# Patient Record
Sex: FEMALE | Race: OTHER | HISPANIC OR LATINO | URBAN - METROPOLITAN AREA
[De-identification: names, ages, dates, MRNs, and addresses within clinical notes are randomized per-mention and may not be internally consistent; named-entity substitution may affect disease eponyms.]

---

## 2017-07-12 ENCOUNTER — EMERGENCY (EMERGENCY)
Facility: HOSPITAL | Age: 37
LOS: 1 days | Discharge: HOME | End: 2017-07-12
Attending: EMERGENCY MEDICINE | Admitting: EMERGENCY MEDICINE
Payer: MEDICAID

## 2017-07-12 VITALS
TEMPERATURE: 98 F | HEART RATE: 91 BPM | DIASTOLIC BLOOD PRESSURE: 79 MMHG | SYSTOLIC BLOOD PRESSURE: 127 MMHG | RESPIRATION RATE: 18 BRPM | OXYGEN SATURATION: 100 %

## 2017-07-12 VITALS — TEMPERATURE: 98 F | OXYGEN SATURATION: 97 % | RESPIRATION RATE: 20 BRPM | HEART RATE: 112 BPM

## 2017-07-12 DIAGNOSIS — F41.8 OTHER SPECIFIED ANXIETY DISORDERS: ICD-10-CM

## 2017-07-12 DIAGNOSIS — F41.0 PANIC DISORDER [EPISODIC PAROXYSMAL ANXIETY]: ICD-10-CM

## 2017-07-12 DIAGNOSIS — Z79.891 LONG TERM (CURRENT) USE OF OPIATE ANALGESIC: ICD-10-CM

## 2017-07-12 DIAGNOSIS — Z79.2 LONG TERM (CURRENT) USE OF ANTIBIOTICS: ICD-10-CM

## 2017-07-12 DIAGNOSIS — R07.89 OTHER CHEST PAIN: ICD-10-CM

## 2017-07-12 DIAGNOSIS — Z79.1 LONG TERM (CURRENT) USE OF NON-STEROIDAL ANTI-INFLAMMATORIES (NSAID): ICD-10-CM

## 2017-07-12 DIAGNOSIS — Z79.899 OTHER LONG TERM (CURRENT) DRUG THERAPY: ICD-10-CM

## 2017-07-12 DIAGNOSIS — R69 ILLNESS, UNSPECIFIED: ICD-10-CM

## 2017-07-12 LAB
ALBUMIN SERPL ELPH-MCNC: 3.9 G/DL — SIGNIFICANT CHANGE UP (ref 3.4–5)
ALP SERPL-CCNC: 81 U/L — SIGNIFICANT CHANGE UP (ref 40–120)
ALT FLD-CCNC: 25 U/L — SIGNIFICANT CHANGE UP (ref 12–42)
ANION GAP SERPL CALC-SCNC: 17 MMOL/L — HIGH (ref 9–16)
APAP SERPL-MCNC: <2 UG/ML — LOW (ref 10–30)
APPEARANCE UR: CLEAR — SIGNIFICANT CHANGE UP
AST SERPL-CCNC: 20 U/L — SIGNIFICANT CHANGE UP (ref 15–37)
BASOPHILS NFR BLD AUTO: 0.4 % — SIGNIFICANT CHANGE UP (ref 0–2)
BILIRUB SERPL-MCNC: 0.3 MG/DL — SIGNIFICANT CHANGE UP (ref 0.2–1.2)
BILIRUB UR-MCNC: NEGATIVE — SIGNIFICANT CHANGE UP
BUN SERPL-MCNC: 8 MG/DL — SIGNIFICANT CHANGE UP (ref 7–23)
CALCIUM SERPL-MCNC: 9.5 MG/DL — SIGNIFICANT CHANGE UP (ref 8.5–10.5)
CHLORIDE SERPL-SCNC: 104 MMOL/L — SIGNIFICANT CHANGE UP (ref 96–108)
CO2 SERPL-SCNC: 20 MMOL/L — LOW (ref 22–31)
COLOR SPEC: YELLOW — SIGNIFICANT CHANGE UP
CREAT SERPL-MCNC: 0.94 MG/DL — SIGNIFICANT CHANGE UP (ref 0.5–1.3)
DIFF PNL FLD: NEGATIVE — SIGNIFICANT CHANGE UP
EOSINOPHIL NFR BLD AUTO: 0.3 % — SIGNIFICANT CHANGE UP (ref 0–6)
ETHANOL SERPL-MCNC: 52 MG/DL — HIGH
GLUCOSE SERPL-MCNC: 114 MG/DL — HIGH (ref 70–99)
GLUCOSE UR QL: NEGATIVE — SIGNIFICANT CHANGE UP
HCG UR QL: POSITIVE — SIGNIFICANT CHANGE UP
HCT VFR BLD CALC: 38.4 % — SIGNIFICANT CHANGE UP (ref 34.5–45)
HGB BLD-MCNC: 13.2 G/DL — SIGNIFICANT CHANGE UP (ref 11.5–15.5)
IMM GRANULOCYTES NFR BLD AUTO: 0.4 % — SIGNIFICANT CHANGE UP (ref 0–1.5)
KETONES UR-MCNC: NEGATIVE — SIGNIFICANT CHANGE UP
LEUKOCYTE ESTERASE UR-ACNC: NEGATIVE — SIGNIFICANT CHANGE UP
LIDOCAIN IGE QN: 197 U/L — SIGNIFICANT CHANGE UP (ref 73–393)
LYMPHOCYTES # BLD AUTO: 30.8 % — SIGNIFICANT CHANGE UP (ref 13–44)
MCHC RBC-ENTMCNC: 28.8 PG — SIGNIFICANT CHANGE UP (ref 27–34)
MCHC RBC-ENTMCNC: 34.4 G/DL — SIGNIFICANT CHANGE UP (ref 32–36)
MCV RBC AUTO: 83.7 FL — SIGNIFICANT CHANGE UP (ref 80–100)
MONOCYTES NFR BLD AUTO: 9.4 % — SIGNIFICANT CHANGE UP (ref 2–14)
NEUTROPHILS NFR BLD AUTO: 58.7 % — SIGNIFICANT CHANGE UP (ref 43–77)
NITRITE UR-MCNC: NEGATIVE — SIGNIFICANT CHANGE UP
PCP SPEC-MCNC: SIGNIFICANT CHANGE UP
PH UR: 6 — SIGNIFICANT CHANGE UP (ref 5–8)
PLATELET # BLD AUTO: 330 K/UL — SIGNIFICANT CHANGE UP (ref 150–400)
POTASSIUM SERPL-MCNC: 3.4 MMOL/L — LOW (ref 3.5–5.3)
POTASSIUM SERPL-SCNC: 3.4 MMOL/L — LOW (ref 3.5–5.3)
PROT SERPL-MCNC: 7.7 G/DL — SIGNIFICANT CHANGE UP (ref 6.4–8.2)
PROT UR-MCNC: NEGATIVE MG/DL — SIGNIFICANT CHANGE UP
RBC # BLD: 4.59 M/UL — SIGNIFICANT CHANGE UP (ref 3.8–5.2)
RBC # FLD: 13.4 % — SIGNIFICANT CHANGE UP (ref 10.3–16.9)
SALICYLATES SERPL-MCNC: 1.1 MG/DL — LOW (ref 2.8–20)
SODIUM SERPL-SCNC: 141 MMOL/L — SIGNIFICANT CHANGE UP (ref 132–145)
SP GR SPEC: 1.02 — SIGNIFICANT CHANGE UP (ref 1–1.03)
TROPONIN I SERPL-MCNC: <0.017 NG/ML — LOW (ref 0.02–0.06)
TSH SERPL-MCNC: 1.09 UIU/ML — SIGNIFICANT CHANGE UP (ref 0.36–3.74)
UROBILINOGEN FLD QL: 0.2 E.U./DL — SIGNIFICANT CHANGE UP
WBC # BLD: 15.8 K/UL — HIGH (ref 3.8–10.5)
WBC # FLD AUTO: 15.8 K/UL — HIGH (ref 3.8–10.5)

## 2017-07-12 PROCEDURE — 99282 EMERGENCY DEPT VISIT SF MDM: CPT

## 2017-07-12 PROCEDURE — 93010 ELECTROCARDIOGRAM REPORT: CPT

## 2017-07-12 PROCEDURE — 99285 EMERGENCY DEPT VISIT HI MDM: CPT | Mod: 25

## 2017-07-12 RX ORDER — POTASSIUM CHLORIDE 20 MEQ
40 PACKET (EA) ORAL ONCE
Qty: 0 | Refills: 0 | Status: COMPLETED | OUTPATIENT
Start: 2017-07-12 | End: 2017-07-12

## 2017-07-12 RX ORDER — ONDANSETRON 8 MG/1
4 TABLET, FILM COATED ORAL ONCE
Qty: 0 | Refills: 0 | Status: COMPLETED | OUTPATIENT
Start: 2017-07-12 | End: 2017-07-12

## 2017-07-12 RX ORDER — SODIUM CHLORIDE 9 MG/ML
1000 INJECTION INTRAMUSCULAR; INTRAVENOUS; SUBCUTANEOUS ONCE
Qty: 0 | Refills: 0 | Status: COMPLETED | OUTPATIENT
Start: 2017-07-12 | End: 2017-07-12

## 2017-07-12 RX ADMIN — SODIUM CHLORIDE 1000 MILLILITER(S): 9 INJECTION INTRAMUSCULAR; INTRAVENOUS; SUBCUTANEOUS at 06:13

## 2017-07-12 RX ADMIN — Medication 1 MILLIGRAM(S): at 05:23

## 2017-07-12 RX ADMIN — ONDANSETRON 4 MILLIGRAM(S): 8 TABLET, FILM COATED ORAL at 05:23

## 2017-07-12 RX ADMIN — Medication 40 MILLIEQUIVALENT(S): at 07:11

## 2017-07-12 NOTE — ED PROVIDER NOTE - PROGRESS NOTE DETAILS
called [facility] to see the patient. spoke to dr knutson who notes they will see the patient after 8:30 am. Cleared by psych for d/c. They are faxing referrals. called [facility] to see the patient. spoke to dr knutson who notes they will see the patient after 8:30 am.

## 2017-07-12 NOTE — ED ADULT NURSE NOTE - OBJECTIVE STATEMENT
amyin patient with complaints of anxiety and insomnia for 3 days accompanied by depression and feelings of sadness. Denies any SI/HI. States she's been very stressed lately.

## 2017-07-12 NOTE — ED BEHAVIORAL HEALTH ASSESSMENT NOTE - DESCRIPTION (FIRST USE, LAST USE, QUANTITY, FREQUENCY, DURATION)
reports daily use reports 1 pint "hard liquor" a week previous use reports last use about a day ago past use of extacy

## 2017-07-12 NOTE — ED BEHAVIORAL HEALTH ASSESSMENT NOTE - RISK ASSESSMENT
Although patient has risk factors of depression, anxiety active substance use, multiple stressors, absence of outpatient follow-up, she has protective factors of no hx of prior suicide attempts, no hospitalizations, no self- injurious behavior, motivation to participate in outpatient care and seek help, no access to firearms. Risk is further mitigated by patient being provided with outpatient referrals. Patient is currently low risk of danger to herself or others and does not require inpatient psychiatric hospitalization.

## 2017-07-12 NOTE — ED BEHAVIORAL HEALTH ASSESSMENT NOTE - SUMMARY
Patient is a 36 year old single female; domiciled with children, ages 14 and 18; unemployed ; PPH of depression and anxiety, in treatment with a therapist, no history of seeing a psychiatrist; no prior hospitalizations; no known suicide attempts; no known history of violence or arrests;  active substance abuse with ETOH and cocaine or known history of complicated withdrawal; no significant PMH; brought in by boyfriend due to patient having a panic attack. Patient reports history of anxiety which has been getting worse as well as some low and irritable mood. She denies si/hi/avh, is open to seeking outpatient treatment. Discussed importance of avoidance of drugs and alcohol as these can worsen symptoms. Patient is low risk of danger to herself or others and does not require inpatient psychiatric hospitalization

## 2017-07-12 NOTE — ED BEHAVIORAL HEALTH ASSESSMENT NOTE - REFERRAL / APPOINTMENT DETAILS
pt to follow up with therapist, psychiatric referrals as well as substance abuse treatment referrals to be provided

## 2017-07-12 NOTE — ED BEHAVIORAL HEALTH ASSESSMENT NOTE - HPI (INCLUDE ILLNESS QUALITY, SEVERITY, DURATION, TIMING, CONTEXT, MODIFYING FACTORS, ASSOCIATED SIGNS AND SYMPTOMS)
Patient is a 36 year old single female; domiciled with children, ages 14 and 18; unemployed ; PPH of depression and anxiety, in treatment with a therapist, no history of seeing a psychiatrist; no prior hospitalizations; no known suicide attempts; no known history of violence or arrests;  active substance abuse with ETOH and cocaine or known history of complicated withdrawal; no significant PMH; brought in by boyfriend due to patient having a panic attack.    Patient reports not been sleeping for 3 days, last night tried to go to bed, had a panic attack, was crying, threw up and couldn't breath so she came to the ER. She reports that there was some improvement with the medication she received. She states that she has been having more anxiety due to stressors, including loosing her mother about a year ago and loosing her job a few weeks ago. She states that because of this she has been anxious, and feeling irritable and down. She states that she at times feels like throwing things and will yell at people for "silly things", denies any history of aggression or violence. Patient reports she has been seeing a therapist "Whitley" but has not seen her in a few weeks because she has not found it helpful. Reports that she gets xanax from her PCP, but also states that although she takes a few a night (up to 3 xanax 0.25mg) pills it has not helped. She endorses weekly ETOH use and occasional cocaine use, last a day or so ago. She reports occasional thoughts that she wishes she was not around but denies any suicidal thoughts, no history of self injurious behaviors. The patient denies manic symptoms, past and present.  The patient denies auditory or visual hallucinations, and no delusions could be elicited on direct questioning.  The patient denies suicidal ideation, homicidal ideation, intent, or plan.     See [facility] note for collateral information. Boyfriend at bedside, denies any concerns regarding patient's safety.    Patient counseled regarding avoiding drug and ETOH use as these can worsen anxiety, and will be given resources in the community for follow up.

## 2017-07-12 NOTE — ED BEHAVIORAL HEALTH ASSESSMENT NOTE - OTHER PAST PSYCHIATRIC HISTORY (INCLUDE DETAILS REGARDING ONSET, COURSE OF ILLNESS, INPATIENT/OUTPATIENT TREATMENT)
history of depression and anxiety, seeing a therapist, no history of suicidality, no hospitalizations, has not seen a psychiatrist

## 2017-07-12 NOTE — ED PROVIDER NOTE - OBJECTIVE STATEMENT
Patient with pmhx of anxiety, mild which worsened this past september after the death of her mother, presents with worsening, anxiety, panic attack and insomnia for 3 days. patient notes having a very vivid nightmare 3 nights ago, of a man following her, and since then has been paranoid to go to sleep. notes PMD managing her anxiety with xanax, and ambien for sleep which currently is not helping and finds herself taking 3 xanax at a time with no sleep. denies visual or auditory hallucinations. denies suicidal or homocidal thoughts. lives with 2 sons. boyfriend at the bedside whom she notes is not a danger to her, no hx of domestic violence. patient notes no prior hx of psychiatric admissions, denies hx of etoh or drug abuse. denies smoking. patient expresses she is exhausted, and tired of feeling this way, patient notes she needs help and feels like she may need to be admitted. reports chest pressure, hyperventilating, associated with N/V, and inability to sleep. denies hx of cardiac disease. notes mild improvement after po zofran and ativan. denies family hx

## 2017-07-12 NOTE — ED BEHAVIORAL HEALTH ASSESSMENT NOTE - DIFFERENTIAL
Anxiety disorder not otherwise specified  Depression not otherwise specified  Substance induced mood disorder

## 2017-07-12 NOTE — ED BEHAVIORAL HEALTH ASSESSMENT NOTE - SUICIDE PROTECTIVE FACTORS
Fear of death or dying due to pain/suffering/Future oriented/Identifies reasons for living/Supportive social network or family

## 2017-07-12 NOTE — ED PROVIDER NOTE - MEDICAL DECISION MAKING DETAILS
psychiatric consultation after medical clearance. will ck trop and tsh. low suspicion for acs, given no family or patient hx of cardiac disease or risk factors. currently no psychotic or suicidal/homocidal. accompanied with partner and improving clinically after benzo in ed. no suspicion for pe.

## 2017-07-12 NOTE — ED BEHAVIORAL HEALTH ASSESSMENT NOTE - DESCRIPTION
patient upset upon arrival, yelling/ crying, received ativan PO and valium IV none lives with children, 14 and 18, currently unemployed

## 2017-07-12 NOTE — ED BEHAVIORAL HEALTH ASSESSMENT NOTE - SAFETY PLAN DETAILS
patient with no access to guns or weapons, aware to return to ER with any worsening suicidal ideation, homicidal ideation

## 2017-07-12 NOTE — ED BEHAVIORAL HEALTH NOTE - BEHAVIORAL HEALTH NOTE
Telepsychiatry Encounter  I have visualized that the patient is on an arms-length 1:1.  I have visualized that the patient is in a private space.  I have confirmed with the patient that they understanding and agree to the evaluation being performed via Telepsychiatry.  I have discussed the above with Telepsychiatry Attending: Dr. Palomo   Records reviewed: McAdoo, Tier, CVM, [facility], Psyckes, Alpha:      Collateral contact name/phone:  Hussein (Nursing station contact)  Relationship to Patient:  significant other   Reliability:  Fair  Opinion of reliability of the patient: Fair  Opinion regarding concern for dangerousness:  S/O states “she says she is sick” but denies that she is suicidal.   Role in Patient’s Aftercare if the patient is released:  S/O plays minimal role in patient’s aftercare.     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  Patient is never  with 2 children ages 13 and 18 that currently live with her. Patient is accompanied by significant other Hussein at bedside who was able to speak with this writer by phone for collateral information.  Patients S/O states that he received a phone call from patient early this morning stating “I have chest pains”, “I am sick”, “I need help”. S/O states that he lives across the street from patient and arrived to patient’s home shortly after this call to accompany her to ED by taxi.     S/O states that he is with patient for approximately 1 year and states feeling that she is anxious on most days with panic attacks once every 2  weeks. S/O states that patient hyperventilates at times, complains of chest pain and becomes agitated often. He states that she also appears depressed at times in relation to her mother’s death about 1 year ago. S/O states that patient has a very poor appetite and sleeps only a few hours a night. S/O denies any evidence of hallucinations or delusions and denies that she is suicidal.  S/O denies that patient has any access to weapons.     S/O states that patient had a similar presentation about 1 month ago and denies that she was every hospitalized psychiatrically as far as he knows. S/O states that she does receive therapy in community but was unsure how often/ therapists name/etc.  S/O states that patient drinks alcohol occasionally and denies any drug use.     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       Family History of mental illness: Unknown     Substance Use/ hx of rehab and detox admissions:  unknown     Prior Legal hx:  Unknown     Physical/Sexual /emotional abuse or neglect/ trauma: unknown     I have discussed the above with Telepsychiatry Attending:  Dr. Palomo
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
## 2017-07-12 NOTE — ED ADULT TRIAGE NOTE - CHIEF COMPLAINT QUOTE
Pt reports chest pain X 3 days, worsening over the past hour. Pt also reports nausea and vomiting. Pt crying and anxious at triage.

## 2017-07-18 ENCOUNTER — EMERGENCY (EMERGENCY)
Facility: HOSPITAL | Age: 37
LOS: 1 days | Discharge: HOME | End: 2017-07-18
Attending: EMERGENCY MEDICINE | Admitting: EMERGENCY MEDICINE
Payer: MEDICAID

## 2017-07-18 VITALS
RESPIRATION RATE: 16 BRPM | TEMPERATURE: 98 F | SYSTOLIC BLOOD PRESSURE: 122 MMHG | HEART RATE: 95 BPM | DIASTOLIC BLOOD PRESSURE: 80 MMHG | OXYGEN SATURATION: 97 %

## 2017-07-18 DIAGNOSIS — O20.0 THREATENED ABORTION: ICD-10-CM

## 2017-07-18 DIAGNOSIS — Z79.899 OTHER LONG TERM (CURRENT) DRUG THERAPY: ICD-10-CM

## 2017-07-18 DIAGNOSIS — Z79.1 LONG TERM (CURRENT) USE OF NON-STEROIDAL ANTI-INFLAMMATORIES (NSAID): ICD-10-CM

## 2017-07-18 DIAGNOSIS — Z3A.00 WEEKS OF GESTATION OF PREGNANCY NOT SPECIFIED: ICD-10-CM

## 2017-07-18 DIAGNOSIS — Z79.2 LONG TERM (CURRENT) USE OF ANTIBIOTICS: ICD-10-CM

## 2017-07-18 LAB
ALBUMIN SERPL ELPH-MCNC: 3.5 G/DL — SIGNIFICANT CHANGE UP (ref 3.4–5)
ALP SERPL-CCNC: 78 U/L — SIGNIFICANT CHANGE UP (ref 40–120)
ALT FLD-CCNC: 25 U/L — SIGNIFICANT CHANGE UP (ref 12–42)
ANION GAP SERPL CALC-SCNC: 5 MMOL/L — LOW (ref 9–16)
APPEARANCE UR: CLEAR — SIGNIFICANT CHANGE UP
AST SERPL-CCNC: 19 U/L — SIGNIFICANT CHANGE UP (ref 15–37)
BACTERIA # UR AUTO: (no result) /HPF
BASOPHILS NFR BLD AUTO: 0.5 % — SIGNIFICANT CHANGE UP (ref 0–2)
BILIRUB SERPL-MCNC: 0.3 MG/DL — SIGNIFICANT CHANGE UP (ref 0.2–1.2)
BILIRUB UR-MCNC: NEGATIVE — SIGNIFICANT CHANGE UP
BLD GP AB SCN SERPL QL: NEGATIVE — SIGNIFICANT CHANGE UP
BUN SERPL-MCNC: 11 MG/DL — SIGNIFICANT CHANGE UP (ref 7–23)
CALCIUM SERPL-MCNC: 9.2 MG/DL — SIGNIFICANT CHANGE UP (ref 8.5–10.5)
CHLORIDE SERPL-SCNC: 106 MMOL/L — SIGNIFICANT CHANGE UP (ref 96–108)
CO2 SERPL-SCNC: 26 MMOL/L — SIGNIFICANT CHANGE UP (ref 22–31)
COLOR SPEC: YELLOW — SIGNIFICANT CHANGE UP
COMMENT - URINE: SIGNIFICANT CHANGE UP
CREAT SERPL-MCNC: 0.9 MG/DL — SIGNIFICANT CHANGE UP (ref 0.5–1.3)
DIFF PNL FLD: (no result)
EOSINOPHIL NFR BLD AUTO: 1.2 % — SIGNIFICANT CHANGE UP (ref 0–6)
EPI CELLS # UR: SIGNIFICANT CHANGE UP /HPF
GLUCOSE SERPL-MCNC: 83 MG/DL — SIGNIFICANT CHANGE UP (ref 70–99)
GLUCOSE UR QL: NEGATIVE — SIGNIFICANT CHANGE UP
HCG SERPL-ACNC: 7338 MIU/ML — SIGNIFICANT CHANGE UP
HCT VFR BLD CALC: 38 % — SIGNIFICANT CHANGE UP (ref 34.5–45)
HGB BLD-MCNC: 12.8 G/DL — SIGNIFICANT CHANGE UP (ref 11.5–15.5)
IMM GRANULOCYTES NFR BLD AUTO: 0.5 % — SIGNIFICANT CHANGE UP (ref 0–1.5)
KETONES UR-MCNC: NEGATIVE — SIGNIFICANT CHANGE UP
LEUKOCYTE ESTERASE UR-ACNC: NEGATIVE — SIGNIFICANT CHANGE UP
LYMPHOCYTES # BLD AUTO: 31.8 % — SIGNIFICANT CHANGE UP (ref 13–44)
MCHC RBC-ENTMCNC: 28.6 PG — SIGNIFICANT CHANGE UP (ref 27–34)
MCHC RBC-ENTMCNC: 33.7 G/DL — SIGNIFICANT CHANGE UP (ref 32–36)
MCV RBC AUTO: 85 FL — SIGNIFICANT CHANGE UP (ref 80–100)
MONOCYTES NFR BLD AUTO: 11.1 % — SIGNIFICANT CHANGE UP (ref 2–14)
NEUTROPHILS NFR BLD AUTO: 54.9 % — SIGNIFICANT CHANGE UP (ref 43–77)
NITRITE UR-MCNC: NEGATIVE — SIGNIFICANT CHANGE UP
PH UR: 5.5 — SIGNIFICANT CHANGE UP (ref 5–8)
PLATELET # BLD AUTO: 293 K/UL — SIGNIFICANT CHANGE UP (ref 150–400)
POTASSIUM SERPL-MCNC: 3.7 MMOL/L — SIGNIFICANT CHANGE UP (ref 3.5–5.3)
POTASSIUM SERPL-SCNC: 3.7 MMOL/L — SIGNIFICANT CHANGE UP (ref 3.5–5.3)
PROT SERPL-MCNC: 7 G/DL — SIGNIFICANT CHANGE UP (ref 6.4–8.2)
PROT UR-MCNC: NEGATIVE MG/DL — SIGNIFICANT CHANGE UP
RBC # BLD: 4.47 M/UL — SIGNIFICANT CHANGE UP (ref 3.8–5.2)
RBC # FLD: 13.5 % — SIGNIFICANT CHANGE UP (ref 10.3–16.9)
RBC CASTS # UR COMP ASSIST: (no result) /HPF
RH IG SCN BLD-IMP: POSITIVE — SIGNIFICANT CHANGE UP
RH IG SCN BLD-IMP: POSITIVE — SIGNIFICANT CHANGE UP
SODIUM SERPL-SCNC: 137 MMOL/L — SIGNIFICANT CHANGE UP (ref 132–145)
SP GR SPEC: 1.02 — SIGNIFICANT CHANGE UP (ref 1–1.03)
UROBILINOGEN FLD QL: 0.2 E.U./DL — SIGNIFICANT CHANGE UP
WBC # BLD: 11.3 K/UL — HIGH (ref 3.8–10.5)
WBC # FLD AUTO: 11.3 K/UL — HIGH (ref 3.8–10.5)
WBC UR QL: < 5 /HPF — SIGNIFICANT CHANGE UP

## 2017-07-18 PROCEDURE — 99284 EMERGENCY DEPT VISIT MOD MDM: CPT

## 2017-07-18 PROCEDURE — 76817 TRANSVAGINAL US OBSTETRIC: CPT | Mod: 26

## 2017-07-18 RX ORDER — NITROFURANTOIN MACROCRYSTAL 50 MG
100 CAPSULE ORAL ONCE
Qty: 0 | Refills: 0 | Status: COMPLETED | OUTPATIENT
Start: 2017-07-18 | End: 2017-07-18

## 2017-07-18 RX ORDER — NITROFURANTOIN MACROCRYSTAL 50 MG
1 CAPSULE ORAL
Qty: 14 | Refills: 0 | OUTPATIENT
Start: 2017-07-18 | End: 2017-07-25

## 2017-07-18 RX ADMIN — Medication 100 MILLIGRAM(S): at 16:58

## 2017-07-18 NOTE — ED ADULT TRIAGE NOTE - CHIEF COMPLAINT QUOTE
Pt reports she took a home pregnancy test yesterday that was positive. This afternoon had sexual intercourse with her boyfriend and then noticed she was bleeding. Pt reports vaginal bleeding and abdominal cramping at present Pt reports she had blood work done Friday confirming she was pregnant. This afternoon had sexual intercourse with her boyfriend and then noticed she was bleeding. Pt reports light vaginal bleeding and abdominal cramping at present

## 2017-07-18 NOTE — ED PROVIDER NOTE - PROGRESS NOTE DETAILS
UA w bacteriuria, covered w macrobid. HCG and US consistent w IUP. Radiology report unavailable at this time. I will d/c pt and instructed to call back for official report. F/U w OB GYN. most likely threatened ab. Pelvic rest recommended

## 2017-07-18 NOTE — ED PROVIDER NOTE - OBJECTIVE STATEMENT
35 yo F w/ history of depression/anxiety, recently told she is pregnant; no follow up with OB/GYN at this time, A4 noted mild vaginal spotting with mild cramping yesterday with today with worsening cramping and vaginal bleeding after intercourse. Pt is unclear of blood type, no dysuria, no vomiting, no fever, no chills. Pt hasn't had ultrasound to confirm pregnancy.

## 2017-07-18 NOTE — ED ADULT NURSE NOTE - CHIEF COMPLAINT QUOTE
Pt reports she had blood work done Friday confirming she was pregnant. This afternoon had sexual intercourse with her boyfriend and then noticed she was bleeding. Pt reports light vaginal bleeding and abdominal cramping at present

## 2018-02-11 ENCOUNTER — EMERGENCY (EMERGENCY)
Facility: HOSPITAL | Age: 38
LOS: 1 days | Discharge: HOME | End: 2018-02-11
Admitting: EMERGENCY MEDICINE
Payer: MEDICAID

## 2018-02-11 VITALS
DIASTOLIC BLOOD PRESSURE: 83 MMHG | TEMPERATURE: 98 F | SYSTOLIC BLOOD PRESSURE: 139 MMHG | OXYGEN SATURATION: 96 % | HEART RATE: 94 BPM | WEIGHT: 179.9 LBS | RESPIRATION RATE: 18 BRPM

## 2018-02-11 DIAGNOSIS — Z11.3 ENCOUNTER FOR SCREENING FOR INFECTIONS WITH A PREDOMINANTLY SEXUAL MODE OF TRANSMISSION: ICD-10-CM

## 2018-02-11 DIAGNOSIS — Z20.2 CONTACT WITH AND (SUSPECTED) EXPOSURE TO INFECTIONS WITH A PREDOMINANTLY SEXUAL MODE OF TRANSMISSION: ICD-10-CM

## 2018-02-11 DIAGNOSIS — Z79.891 LONG TERM (CURRENT) USE OF OPIATE ANALGESIC: ICD-10-CM

## 2018-02-11 DIAGNOSIS — Z79.1 LONG TERM (CURRENT) USE OF NON-STEROIDAL ANTI-INFLAMMATORIES (NSAID): ICD-10-CM

## 2018-02-11 DIAGNOSIS — Z79.2 LONG TERM (CURRENT) USE OF ANTIBIOTICS: ICD-10-CM

## 2018-02-11 DIAGNOSIS — Z79.899 OTHER LONG TERM (CURRENT) DRUG THERAPY: ICD-10-CM

## 2018-02-11 LAB
APPEARANCE UR: CLEAR — SIGNIFICANT CHANGE UP
BILIRUB UR-MCNC: NEGATIVE — SIGNIFICANT CHANGE UP
COLOR SPEC: YELLOW — SIGNIFICANT CHANGE UP
DIFF PNL FLD: NEGATIVE — SIGNIFICANT CHANGE UP
GLUCOSE UR QL: NEGATIVE — SIGNIFICANT CHANGE UP
HCG UR QL: NEGATIVE — SIGNIFICANT CHANGE UP
HIV 1 & 2 AB SERPL IA.RAPID: SIGNIFICANT CHANGE UP
KETONES UR-MCNC: NEGATIVE — SIGNIFICANT CHANGE UP
LEUKOCYTE ESTERASE UR-ACNC: NEGATIVE — SIGNIFICANT CHANGE UP
NITRITE UR-MCNC: NEGATIVE — SIGNIFICANT CHANGE UP
PH UR: 6 — SIGNIFICANT CHANGE UP (ref 5–8)
PROT UR-MCNC: NEGATIVE MG/DL — SIGNIFICANT CHANGE UP
SP GR SPEC: >=1.03 — SIGNIFICANT CHANGE UP (ref 1–1.03)
UROBILINOGEN FLD QL: 0.2 E.U./DL — SIGNIFICANT CHANGE UP

## 2018-02-11 PROCEDURE — 99283 EMERGENCY DEPT VISIT LOW MDM: CPT

## 2018-02-11 RX ORDER — CLONAZEPAM 1 MG
0.5 TABLET ORAL ONCE
Qty: 0 | Refills: 0 | Status: DISCONTINUED | OUTPATIENT
Start: 2018-02-11 | End: 2018-02-11

## 2018-02-11 RX ORDER — EMTRICITABINE AND TENOFOVIR DISOPROXIL FUMARATE 200; 300 MG/1; MG/1
1 TABLET, FILM COATED ORAL
Qty: 7 | Refills: 0 | OUTPATIENT
Start: 2018-02-11 | End: 2018-02-17

## 2018-02-11 RX ORDER — CEFTRIAXONE 500 MG/1
250 INJECTION, POWDER, FOR SOLUTION INTRAMUSCULAR; INTRAVENOUS ONCE
Qty: 0 | Refills: 0 | Status: COMPLETED | OUTPATIENT
Start: 2018-02-11 | End: 2018-02-11

## 2018-02-11 RX ORDER — RALTEGRAVIR 400 MG/1
1 TABLET, FILM COATED ORAL
Qty: 14 | Refills: 0 | OUTPATIENT
Start: 2018-02-11 | End: 2018-02-17

## 2018-02-11 RX ORDER — EMTRICITABINE AND TENOFOVIR DISOPROXIL FUMARATE 200; 300 MG/1; MG/1
1 TABLET, FILM COATED ORAL
Qty: 30 | Refills: 0 | OUTPATIENT
Start: 2018-02-11 | End: 2018-03-12

## 2018-02-11 RX ORDER — AZITHROMYCIN 500 MG/1
1000 TABLET, FILM COATED ORAL ONCE
Qty: 0 | Refills: 0 | Status: COMPLETED | OUTPATIENT
Start: 2018-02-11 | End: 2018-02-11

## 2018-02-11 RX ORDER — RALTEGRAVIR 400 MG/1
1 TABLET, FILM COATED ORAL
Qty: 60 | Refills: 0
Start: 2018-02-11 | End: 2018-03-12

## 2018-02-11 RX ORDER — EMTRICITABINE AND TENOFOVIR DISOPROXIL FUMARATE 200; 300 MG/1; MG/1
1 TABLET, FILM COATED ORAL
Qty: 30 | Refills: 0
Start: 2018-02-11 | End: 2018-03-12

## 2018-02-11 RX ADMIN — CEFTRIAXONE 250 MILLIGRAM(S): 500 INJECTION, POWDER, FOR SOLUTION INTRAMUSCULAR; INTRAVENOUS at 18:10

## 2018-02-11 RX ADMIN — Medication 0.5 MILLIGRAM(S): at 19:30

## 2018-02-11 RX ADMIN — AZITHROMYCIN 1000 MILLIGRAM(S): 500 TABLET, FILM COATED ORAL at 18:10
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
## 2018-02-11 NOTE — ED PROVIDER NOTE - MEDICAL DECISION MAKING DETAILS
Patient requesting PrEP and STD treatment. Will dispense 1 weeks of treatment Isentress and Truvada, Ceftriaxone and Zithromax given in ED, rapid HIV drawn.

## 2018-02-11 NOTE — ED ADULT NURSE NOTE - OBJECTIVE STATEMENT
Patient to ED requesting STD/STI and PEP Kit- pt is very anxious in triage, states she had unprotected and consensual sex-

## 2018-02-11 NOTE — ED ADULT TRIAGE NOTE - CHIEF COMPLAINT QUOTE
requesting STD/STI and PEP Kit- pt is very anxious in triage, states she had unprotected and consensual sex-

## 2018-05-11 ENCOUNTER — EMERGENCY (EMERGENCY)
Facility: HOSPITAL | Age: 38
LOS: 1 days | Discharge: HOME | End: 2018-05-11
Admitting: EMERGENCY MEDICINE
Payer: MEDICAID

## 2018-05-11 VITALS
HEART RATE: 71 BPM | OXYGEN SATURATION: 98 % | SYSTOLIC BLOOD PRESSURE: 136 MMHG | DIASTOLIC BLOOD PRESSURE: 82 MMHG | RESPIRATION RATE: 18 BRPM | TEMPERATURE: 98 F

## 2018-05-11 DIAGNOSIS — Z79.1 LONG TERM (CURRENT) USE OF NON-STEROIDAL ANTI-INFLAMMATORIES (NSAID): ICD-10-CM

## 2018-05-11 DIAGNOSIS — L50.0 ALLERGIC URTICARIA: ICD-10-CM

## 2018-05-11 DIAGNOSIS — Z79.899 OTHER LONG TERM (CURRENT) DRUG THERAPY: ICD-10-CM

## 2018-05-11 DIAGNOSIS — Z79.2 LONG TERM (CURRENT) USE OF ANTIBIOTICS: ICD-10-CM

## 2018-05-11 PROCEDURE — 99283 EMERGENCY DEPT VISIT LOW MDM: CPT

## 2018-05-11 RX ORDER — DIPHENHYDRAMINE HCL 50 MG
1 CAPSULE ORAL
Qty: 12 | Refills: 0 | OUTPATIENT
Start: 2018-05-11 | End: 2018-05-14

## 2018-05-11 RX ORDER — FAMOTIDINE 10 MG/ML
20 INJECTION INTRAVENOUS ONCE
Qty: 0 | Refills: 0 | Status: COMPLETED | OUTPATIENT
Start: 2018-05-11 | End: 2018-05-11

## 2018-05-11 RX ORDER — DIPHENHYDRAMINE HCL 50 MG
25 CAPSULE ORAL ONCE
Qty: 0 | Refills: 0 | Status: COMPLETED | OUTPATIENT
Start: 2018-05-11 | End: 2018-05-11

## 2018-05-11 RX ORDER — FAMOTIDINE 10 MG/ML
1 INJECTION INTRAVENOUS
Qty: 8 | Refills: 0 | OUTPATIENT
Start: 2018-05-11 | End: 2018-05-14

## 2018-05-11 RX ADMIN — Medication 60 MILLIGRAM(S): at 15:08

## 2018-05-11 RX ADMIN — Medication 25 MILLIGRAM(S): at 15:08

## 2018-05-11 RX ADMIN — FAMOTIDINE 20 MILLIGRAM(S): 10 INJECTION INTRAVENOUS at 15:08

## 2018-05-11 NOTE — ED PROVIDER NOTE - MEDICAL DECISION MAKING DETAILS
Allergic reaction, probably to hair dye. No airway issues. Will prescribe short course of prednisone, Pepcid, and benadryl. Advised to follow-up with allergist and dermatologist. Strict return precautions discussed at length. All questions answered. Will discharge. Allergic reaction probably to hair dye. No airway issues. Will prescribe short course of prednisone, Pepcid, and benadryl, continue zyrtec. advised to follow-up with allergist/dermatologist. Strict return precautions discussed at length. All questions answered. Will discharge.

## 2018-05-11 NOTE — ED ADULT NURSE NOTE - CHPI ED SYMPTOMS NEG
no throat itching/no wheezing/no cough/no difficulty swallowing/no difficulty breathing/no vomiting/no shortness of breath/no swelling of face, tongue/no nausea

## 2018-05-11 NOTE — ED PROVIDER NOTE - OBJECTIVE STATEMENT
37y F with PMHx anxiety (on clonopin), c/o facial itching with redness that extends to shoulders x 4 days. Symptoms started the day after she dyed her hair. She states she saw her PMD, and tried benadryl and atarax with no relief. Pt took one dose of zyrtec with minimal relief. No throat pain or swelling, no SOB. States she has never had allergic reaction in the past, and has never used an Epipen. 37y F with PMHx anxiety (on klonopin), c/o facial itching with hives that extends to shoulders x 4 days. Symptoms started the day after she dyed her hair. She states she saw her PMD, tried benadryl and atarax with no relief. Pt took also one dose of zyrtec with minimal relief. No throat pain or swelling, no SOB. States she has never had allergic reaction in the past, has never used an Epipen in the past.

## 2018-05-11 NOTE — ED PROVIDER NOTE - NEUROLOGICAL, MLM
Alert and oriented, no focal deficits, no motor or sensory deficits. Patient baseline mental status/Alert and oriented to person, place and time

## 2018-05-11 NOTE — ED ADULT NURSE NOTE - OBJECTIVE STATEMENT
Pt with generalized flushed skin and itching x 4days. No airway or breathing involvement. Breathing even unlabored. Doesn't know of any allergies

## 2018-05-11 NOTE — ED PROVIDER NOTE - ENMT, MLM
Airway intact with no uvular swelling. No pharyngeal swelling. Normal voice. Airway intact with no uvular swelling. No pharyngeal swelling. Normal voice. no lip swelling, no tongue swelling.

## 2018-05-11 NOTE — ED ADULT NURSE NOTE - CHIEF COMPLAINT QUOTE
Pt c/o pruritic rash x 4 days, unknown cause, pmd prescribed benadryl, then hydroxyzine and zyrtec with little effectiveness. colored her hair this week but cannot recall any new soaps or lotions, hx eczema. Redness of face and neck, periorbital swelling, airway patent.

## 2018-09-21 ENCOUNTER — EMERGENCY (EMERGENCY)
Facility: HOSPITAL | Age: 38
LOS: 1 days | Discharge: HOME | End: 2018-09-21
Attending: EMERGENCY MEDICINE | Admitting: EMERGENCY MEDICINE
Payer: MEDICAID

## 2018-09-21 VITALS
SYSTOLIC BLOOD PRESSURE: 112 MMHG | OXYGEN SATURATION: 95 % | RESPIRATION RATE: 16 BRPM | DIASTOLIC BLOOD PRESSURE: 78 MMHG | TEMPERATURE: 98 F | HEART RATE: 84 BPM

## 2018-09-21 VITALS
SYSTOLIC BLOOD PRESSURE: 134 MMHG | TEMPERATURE: 98 F | DIASTOLIC BLOOD PRESSURE: 79 MMHG | HEART RATE: 103 BPM | OXYGEN SATURATION: 98 % | RESPIRATION RATE: 16 BRPM

## 2018-09-21 DIAGNOSIS — Z79.1 LONG TERM (CURRENT) USE OF NON-STEROIDAL ANTI-INFLAMMATORIES (NSAID): ICD-10-CM

## 2018-09-21 DIAGNOSIS — R10.2 PELVIC AND PERINEAL PAIN: ICD-10-CM

## 2018-09-21 DIAGNOSIS — R10.32 LEFT LOWER QUADRANT PAIN: ICD-10-CM

## 2018-09-21 DIAGNOSIS — Z79.899 OTHER LONG TERM (CURRENT) DRUG THERAPY: ICD-10-CM

## 2018-09-21 DIAGNOSIS — R42 DIZZINESS AND GIDDINESS: ICD-10-CM

## 2018-09-21 PROBLEM — F41.8 OTHER SPECIFIED ANXIETY DISORDERS: Chronic | Status: ACTIVE | Noted: 2017-07-18

## 2018-09-21 LAB
ALBUMIN SERPL ELPH-MCNC: 3.1 G/DL — LOW (ref 3.4–5)
ALP SERPL-CCNC: 70 U/L — SIGNIFICANT CHANGE UP (ref 40–120)
ALT FLD-CCNC: 23 U/L — SIGNIFICANT CHANGE UP (ref 12–42)
ANION GAP SERPL CALC-SCNC: 5 MMOL/L — LOW (ref 9–16)
APPEARANCE UR: CLEAR — SIGNIFICANT CHANGE UP
AST SERPL-CCNC: 16 U/L — SIGNIFICANT CHANGE UP (ref 15–37)
BASOPHILS NFR BLD AUTO: 0.8 % — SIGNIFICANT CHANGE UP (ref 0–2)
BILIRUB SERPL-MCNC: 0.1 MG/DL — LOW (ref 0.2–1.2)
BILIRUB UR-MCNC: NEGATIVE — SIGNIFICANT CHANGE UP
BUN SERPL-MCNC: 16 MG/DL — SIGNIFICANT CHANGE UP (ref 7–23)
CALCIUM SERPL-MCNC: 8.5 MG/DL — SIGNIFICANT CHANGE UP (ref 8.5–10.5)
CHLORIDE SERPL-SCNC: 107 MMOL/L — SIGNIFICANT CHANGE UP (ref 96–108)
CO2 SERPL-SCNC: 27 MMOL/L — SIGNIFICANT CHANGE UP (ref 22–31)
COLOR SPEC: YELLOW — SIGNIFICANT CHANGE UP
CREAT SERPL-MCNC: 0.98 MG/DL — SIGNIFICANT CHANGE UP (ref 0.5–1.3)
DIFF PNL FLD: ABNORMAL
EOSINOPHIL NFR BLD AUTO: 2.5 % — SIGNIFICANT CHANGE UP (ref 0–6)
GLUCOSE SERPL-MCNC: 100 MG/DL — HIGH (ref 70–99)
GLUCOSE UR QL: NEGATIVE — SIGNIFICANT CHANGE UP
HCG SERPL-ACNC: <1 MIU/ML — SIGNIFICANT CHANGE UP
HCT VFR BLD CALC: 38.7 % — SIGNIFICANT CHANGE UP (ref 34.5–45)
HGB BLD-MCNC: 12.6 G/DL — SIGNIFICANT CHANGE UP (ref 11.5–15.5)
IMM GRANULOCYTES NFR BLD AUTO: 0.3 % — SIGNIFICANT CHANGE UP (ref 0–1.5)
KETONES UR-MCNC: NEGATIVE — SIGNIFICANT CHANGE UP
LEUKOCYTE ESTERASE UR-ACNC: NEGATIVE — SIGNIFICANT CHANGE UP
LYMPHOCYTES # BLD AUTO: 46.7 % — HIGH (ref 13–44)
MCHC RBC-ENTMCNC: 27.9 PG — SIGNIFICANT CHANGE UP (ref 27–34)
MCHC RBC-ENTMCNC: 32.6 G/DL — SIGNIFICANT CHANGE UP (ref 32–36)
MCV RBC AUTO: 85.6 FL — SIGNIFICANT CHANGE UP (ref 80–100)
MONOCYTES NFR BLD AUTO: 12.9 % — SIGNIFICANT CHANGE UP (ref 2–14)
NEUTROPHILS NFR BLD AUTO: 36.8 % — LOW (ref 43–77)
NITRITE UR-MCNC: NEGATIVE — SIGNIFICANT CHANGE UP
PH UR: 6 — SIGNIFICANT CHANGE UP (ref 5–8)
PLATELET # BLD AUTO: 348 K/UL — SIGNIFICANT CHANGE UP (ref 150–400)
POTASSIUM SERPL-MCNC: 3.6 MMOL/L — SIGNIFICANT CHANGE UP (ref 3.5–5.3)
POTASSIUM SERPL-SCNC: 3.6 MMOL/L — SIGNIFICANT CHANGE UP (ref 3.5–5.3)
PROT SERPL-MCNC: 7.1 G/DL — SIGNIFICANT CHANGE UP (ref 6.4–8.2)
PROT UR-MCNC: NEGATIVE MG/DL — SIGNIFICANT CHANGE UP
RBC # BLD: 4.52 M/UL — SIGNIFICANT CHANGE UP (ref 3.8–5.2)
RBC # FLD: 13.6 % — SIGNIFICANT CHANGE UP (ref 10.3–14.5)
SODIUM SERPL-SCNC: 139 MMOL/L — SIGNIFICANT CHANGE UP (ref 132–145)
SP GR SPEC: >=1.03 — SIGNIFICANT CHANGE UP (ref 1–1.03)
UROBILINOGEN FLD QL: 0.2 E.U./DL — SIGNIFICANT CHANGE UP
WBC # BLD: 7.2 K/UL — SIGNIFICANT CHANGE UP (ref 3.8–10.5)
WBC # FLD AUTO: 7.2 K/UL — SIGNIFICANT CHANGE UP (ref 3.8–10.5)

## 2018-09-21 PROCEDURE — 76830 TRANSVAGINAL US NON-OB: CPT | Mod: 26

## 2018-09-21 PROCEDURE — 76856 US EXAM PELVIC COMPLETE: CPT | Mod: 26

## 2018-09-21 PROCEDURE — 99285 EMERGENCY DEPT VISIT HI MDM: CPT

## 2018-09-21 RX ORDER — HYDROMORPHONE HYDROCHLORIDE 2 MG/ML
0.5 INJECTION INTRAMUSCULAR; INTRAVENOUS; SUBCUTANEOUS ONCE
Qty: 0 | Refills: 0 | Status: DISCONTINUED | OUTPATIENT
Start: 2018-09-21 | End: 2018-09-21

## 2018-09-21 RX ORDER — MORPHINE SULFATE 50 MG/1
4 CAPSULE, EXTENDED RELEASE ORAL ONCE
Qty: 0 | Refills: 0 | Status: DISCONTINUED | OUTPATIENT
Start: 2018-09-21 | End: 2018-09-21

## 2018-09-21 RX ORDER — ONDANSETRON 8 MG/1
4 TABLET, FILM COATED ORAL ONCE
Qty: 0 | Refills: 0 | Status: COMPLETED | OUTPATIENT
Start: 2018-09-21 | End: 2018-09-21

## 2018-09-21 RX ORDER — SODIUM CHLORIDE 9 MG/ML
1000 INJECTION INTRAMUSCULAR; INTRAVENOUS; SUBCUTANEOUS ONCE
Qty: 0 | Refills: 0 | Status: COMPLETED | OUTPATIENT
Start: 2018-09-21 | End: 2018-09-21

## 2018-09-21 RX ADMIN — SODIUM CHLORIDE 2000 MILLILITER(S): 9 INJECTION INTRAMUSCULAR; INTRAVENOUS; SUBCUTANEOUS at 17:16

## 2018-09-21 RX ADMIN — HYDROMORPHONE HYDROCHLORIDE 0.5 MILLIGRAM(S): 2 INJECTION INTRAMUSCULAR; INTRAVENOUS; SUBCUTANEOUS at 20:12

## 2018-09-21 RX ADMIN — MORPHINE SULFATE 4 MILLIGRAM(S): 50 CAPSULE, EXTENDED RELEASE ORAL at 17:15

## 2018-09-21 RX ADMIN — SODIUM CHLORIDE 1000 MILLILITER(S): 9 INJECTION INTRAMUSCULAR; INTRAVENOUS; SUBCUTANEOUS at 19:18

## 2018-09-21 RX ADMIN — MORPHINE SULFATE 4 MILLIGRAM(S): 50 CAPSULE, EXTENDED RELEASE ORAL at 19:18

## 2018-09-21 RX ADMIN — ONDANSETRON 4 MILLIGRAM(S): 8 TABLET, FILM COATED ORAL at 20:12

## 2018-09-21 NOTE — ED PROVIDER NOTE - MEDICAL DECISION MAKING DETAILS
38 y/o F w/ worsening endometriosis type pain, LLQ tenderness on exam, otherwise, well appearing. Will check labs for anemia, pelvic ultrasound, provide analgesia, and reassess.

## 2018-09-21 NOTE — ED ADULT NURSE NOTE - NSIMPLEMENTINTERV_GEN_ALL_ED
Implemented All Universal Safety Interventions:  [place] to call system. Call bell, personal items and telephone within reach. Instruct patient to call for assistance. Room bathroom lighting operational. Non-slip footwear when patient is off stretcher. Physically safe environment: no spills, clutter or unnecessary equipment. Stretcher in lowest position, wheels locked, appropriate side rails in place.

## 2018-09-21 NOTE — ED PROVIDER NOTE - ATTENDING CONTRIBUTION TO CARE
See progress notes above written by me.    Patient with longstanding and worsening menstrual pain secondary to fibroids and endometroisis also associated with heavy bleeding presents with a classic exacerbation of her pain in her left pelvic area c/w with all prior episodes.  No vaginal discharge.  No fever.  No syncope.    W/u confirms lack of anemia and presence multiple fibroids.  Narendra's symptoms improved with pain medication, will d/c home with plans to f/u with private GYN where an elective hysterectomy is already being planned.

## 2018-09-21 NOTE — ED PROVIDER NOTE - MUSCULOSKELETAL, MLM
Spine appears normal, range of motion is not limited, no muscle or joint tenderness. +L straight leg raise.

## 2018-09-21 NOTE — ED PROVIDER NOTE - OBJECTIVE STATEMENT
36 y/o F w/ PMH sciatica, endometriosis and fibroids followed by Dr. Montero of OB/GYN at [facility], who presents with worsening abdominal pain and heavier bleeding x2 days. Patient notes progressively worsening pain and bleeding with menstrual cycle. Has been putting off endometriosis treatment and surgery but is at a point where she thinks surgery is necessary. Also feels dizzy and light-headed. No fever or chills. No chest pain or shortness of breath. No nausea, vomiting, or diarrhea. 36 y/o F w/ PMH sciatica, endometriosis and fibroids followed by Dr. Montero of OB/GYN at [facility], who presents with worsening abdominal pain and heavier bleeding x2 days. Patient notes progressively worsening pain and bleeding with menstrual cycle. Has been putting off endometriosis treatment and surgery but is at a point where she thinks surgery is necessary. Also feels dizzy and light-headed. No fever or chills. No chest pain or shortness of breath. No nausea, vomiting, or diarrhea. Reports taking 800 mg of ibuprofen at 2 pm without relief. 38 y/o F w/ PMH sciatica, endometriosis and fibroids followed by Dr. Montero of OB/GYN at [facility], who presents with worsening abdominal pain and heavier bleeding for past 2 years.  Patient notes progressively worsening pain and bleeding with menstrual cycle. Has been putting off endometriosis treatment and surgery but is at a point where she thinks surgery is necessary. Also feels dizzy and light-headed. No fever or chills. No chest pain or shortness of breath. No nausea, vomiting, or diarrhea. Reports taking 800 mg of ibuprofen at 2 pm without relief.

## 2018-09-21 NOTE — ED ADULT NURSE NOTE - OBJECTIVE STATEMENT
presents with worsening abdominal pain and heavier bleeding x2 days. Patient notes progressively worsening pain and bleeding with menstrual cycle. Has been putting off endometriosis treatment and surgery but is at a point where she thinks surgery is necessary.

## 2018-11-19 ENCOUNTER — EMERGENCY (EMERGENCY)
Facility: HOSPITAL | Age: 38
LOS: 1 days | Discharge: HOME | End: 2018-11-19
Attending: EMERGENCY MEDICINE | Admitting: EMERGENCY MEDICINE
Payer: MEDICAID

## 2018-11-19 VITALS
OXYGEN SATURATION: 97 % | RESPIRATION RATE: 18 BRPM | DIASTOLIC BLOOD PRESSURE: 86 MMHG | TEMPERATURE: 99 F | HEART RATE: 74 BPM | SYSTOLIC BLOOD PRESSURE: 133 MMHG

## 2018-11-19 DIAGNOSIS — R10.9 UNSPECIFIED ABDOMINAL PAIN: ICD-10-CM

## 2018-11-19 DIAGNOSIS — R07.9 CHEST PAIN, UNSPECIFIED: ICD-10-CM

## 2018-11-19 DIAGNOSIS — R06.02 SHORTNESS OF BREATH: ICD-10-CM

## 2018-11-19 DIAGNOSIS — Z79.1 LONG TERM (CURRENT) USE OF NON-STEROIDAL ANTI-INFLAMMATORIES (NSAID): ICD-10-CM

## 2018-11-19 DIAGNOSIS — Z79.899 OTHER LONG TERM (CURRENT) DRUG THERAPY: ICD-10-CM

## 2018-11-19 DIAGNOSIS — F43.0 ACUTE STRESS REACTION: ICD-10-CM

## 2018-11-19 PROCEDURE — 99284 EMERGENCY DEPT VISIT MOD MDM: CPT | Mod: 25

## 2018-11-19 PROCEDURE — 93010 ELECTROCARDIOGRAM REPORT: CPT

## 2018-11-19 RX ORDER — CLONAZEPAM 1 MG
1 TABLET ORAL ONCE
Qty: 0 | Refills: 0 | Status: DISCONTINUED | OUTPATIENT
Start: 2018-11-19 | End: 2018-11-19

## 2018-11-19 RX ORDER — ALPRAZOLAM 0.25 MG
0.5 TABLET ORAL ONCE
Qty: 0 | Refills: 0 | Status: DISCONTINUED | OUTPATIENT
Start: 2018-11-19 | End: 2018-11-19

## 2018-11-19 NOTE — ED PROVIDER NOTE - CONSTITUTIONAL, MLM
normal... well nourished, awake, alert, oriented to person, place, time/situation, anxious appearing

## 2018-11-19 NOTE — ED PROVIDER NOTE - MEDICAL DECISION MAKING DETAILS
symptoms likely related to anxiety with panic attack.  EKG nsr.  Lungs clear, no loud murmurs.  Denies pertinent medical or family history.  Oral clonazepam ordered.

## 2018-11-19 NOTE — ED PROVIDER NOTE - OBJECTIVE STATEMENT
HX of panic attacks on clonopin presents with chest pain/abdominal pain in the setting of an argument with her son.  Symptoms acute in onset, constant, mild.  Similar symptoms in the past.  Denies other pertinent medical history.  No pertinent family history.  BIBEMS for evaluation.  Symptoms present on arrival.

## 2018-11-19 NOTE — ED PROVIDER NOTE - NSFOLLOWUPINSTRUCTIONS_ED_ALL_ED_FT
Please follow up with your PMD as discussed.  Return if you have worsening pain, shortness of breath, fever, chills, or worsening symptoms.  Take the medication as prescribed.

## 2018-11-20 RX ADMIN — Medication 0.5 MILLIGRAM(S): at 00:00

## 2019-06-09 ENCOUNTER — EMERGENCY (EMERGENCY)
Facility: HOSPITAL | Age: 39
LOS: 1 days | Discharge: HOME | End: 2019-06-09
Attending: EMERGENCY MEDICINE | Admitting: EMERGENCY MEDICINE
Payer: MEDICAID

## 2019-06-09 VITALS
RESPIRATION RATE: 17 BRPM | SYSTOLIC BLOOD PRESSURE: 127 MMHG | TEMPERATURE: 98 F | DIASTOLIC BLOOD PRESSURE: 83 MMHG | OXYGEN SATURATION: 98 % | HEART RATE: 89 BPM

## 2019-06-09 VITALS
RESPIRATION RATE: 18 BRPM | HEART RATE: 99 BPM | OXYGEN SATURATION: 97 % | DIASTOLIC BLOOD PRESSURE: 87 MMHG | TEMPERATURE: 98 F | SYSTOLIC BLOOD PRESSURE: 131 MMHG | WEIGHT: 175.05 LBS

## 2019-06-09 DIAGNOSIS — M54.2 CERVICALGIA: ICD-10-CM

## 2019-06-09 DIAGNOSIS — M79.18 MYALGIA, OTHER SITE: ICD-10-CM

## 2019-06-09 PROCEDURE — 99283 EMERGENCY DEPT VISIT LOW MDM: CPT

## 2019-06-09 RX ORDER — DIAZEPAM 5 MG
5 TABLET ORAL ONCE
Refills: 0 | Status: DISCONTINUED | OUTPATIENT
Start: 2019-06-09 | End: 2019-06-09

## 2019-06-09 RX ORDER — DIAZEPAM 5 MG
1 TABLET ORAL
Qty: 9 | Refills: 0
Start: 2019-06-09 | End: 2019-06-11

## 2019-06-09 RX ORDER — IBUPROFEN 200 MG
600 TABLET ORAL ONCE
Refills: 0 | Status: COMPLETED | OUTPATIENT
Start: 2019-06-09 | End: 2019-06-09

## 2019-06-09 RX ORDER — CYCLOBENZAPRINE HYDROCHLORIDE 10 MG/1
10 TABLET, FILM COATED ORAL ONCE
Refills: 0 | Status: COMPLETED | OUTPATIENT
Start: 2019-06-09 | End: 2019-06-09

## 2019-06-09 RX ORDER — IBUPROFEN 200 MG
1 TABLET ORAL
Qty: 40 | Refills: 0
Start: 2019-06-09 | End: 2019-06-18

## 2019-06-09 RX ADMIN — Medication 5 MILLIGRAM(S): at 17:45

## 2019-06-09 RX ADMIN — Medication 600 MILLIGRAM(S): at 15:28

## 2019-06-09 RX ADMIN — CYCLOBENZAPRINE HYDROCHLORIDE 10 MILLIGRAM(S): 10 TABLET, FILM COATED ORAL at 15:27

## 2019-06-09 RX ADMIN — Medication 600 MILLIGRAM(S): at 15:41

## 2019-06-09 NOTE — ED PROVIDER NOTE - OBJECTIVE STATEMENT
Patient, myself, Amnada(Scribe), and Noreen (Scribe in training) were in the room during the time of evaluation.  Triage Note: c/o neck pain radiating down back x 4 days. denies recent injury/trauma/fall. heat pack given for comfort.  neck pain  Triage VS: HR:99, BP:131/87, Resp:18, Temp(C): 36.8, SpO2: 97     37 y/o Female with no PMHx presents to the ER with neck pain radiating down her right neck. Pt states the pain has been constant and worsened for the past 4 days. She took 800mg of Tylenol but no relief. Denies any recent injuries. Denies CP, HA, SOB, fever, and tingling. No daily medications. Patient, myself, Amanda(Scribe), and Noreen (Scribe in training) were in the room during the time of evaluation.  Triage Note: c/o neck pain radiating down back x 4 days. denies recent injury/trauma/fall. heat pack given for comfort.  neck pain  Triage VS: HR:99, BP:131/87, Resp:18, Temp(C): 36.8, SpO2: 97     39 y/o Female with no PMHx presents to the ER with right lateral neck pain persistent non progressive x 4 days. She took 800mg of Tylenol but mild  relief. Denies any recent injuries. Denies CP, HA, SOB, fever. no radiation of pain to arm. no tingling sensations/paresthesia to right arm no weakness of right arm. No daily medications. no recent infections.

## 2019-06-09 NOTE — ED ADULT TRIAGE NOTE - CHIEF COMPLAINT QUOTE
c/o neck pain radiating down back x 4 days. denies recent injury/trauma/fall. heat pack given for comfort.

## 2019-06-09 NOTE — ED ADULT NURSE NOTE - NSIMPLEMENTINTERV_GEN_ALL_ED
Implemented All Universal Safety Interventions:  [place] to call system. Call bell, personal items and telephone within reach. Instruct patient to call for assistance. Room bathroom lighting operational. Non-slip footwear when patient is off stretcher. Physically safe environment: no spills, clutter or unnecessary equipment. Stretcher in lowest position, wheels locked, appropriate side rails in place.

## 2019-06-09 NOTE — ED PROVIDER NOTE - MUSCULOSKELETAL, MLM
right trapezius musle tenderness to palpation, FROM without exacerbation of pain. Able to fully extend neck, No cervical, thoracic ot lumbar spine tenderness to percussion or palpation. Flexion/extension of spine without pain. right trapezius muscle tenderness to palpation, FROM of right shoulder without exacerbation of pain. Able to fully flex and extend neck without pain, No cervical, thoracic ot lumbar spine tenderness to percussion or palpation. Flexion/extension of spine without pain.

## 2019-06-09 NOTE — ED PROVIDER NOTE - NEURO NEGATIVE STATEMENT, MLM
no headache no loss of consciousness, no gait abnormality, no headache, no sensory deficits, and no weakness.

## 2019-06-09 NOTE — ED PROVIDER NOTE - NSFOLLOWUPINSTRUCTIONS_ED_ALL_ED_FT
take medications as directed     follow up with your doctor as scheduled     return to ER for worsening pain, fever or any other concern

## 2019-06-09 NOTE — ED PROVIDER NOTE - NEUROLOGICAL, MLM
Alert and oriented, no focal deficits, no motor or sensory deficits. Alert and oriented, speech fluent and appropriate cooperative no motor or sensory deficits. gait nomal

## 2019-08-31 ENCOUNTER — EMERGENCY (EMERGENCY)
Facility: HOSPITAL | Age: 39
LOS: 1 days | Discharge: HOME | End: 2019-08-31
Admitting: EMERGENCY MEDICINE
Payer: MEDICAID

## 2019-08-31 VITALS
TEMPERATURE: 99 F | OXYGEN SATURATION: 96 % | DIASTOLIC BLOOD PRESSURE: 87 MMHG | SYSTOLIC BLOOD PRESSURE: 136 MMHG | RESPIRATION RATE: 17 BRPM | HEART RATE: 82 BPM

## 2019-08-31 LAB
ALBUMIN SERPL ELPH-MCNC: 3 G/DL — LOW (ref 3.4–5)
ALP SERPL-CCNC: 91 U/L — SIGNIFICANT CHANGE UP (ref 40–120)
ALT FLD-CCNC: 22 U/L — SIGNIFICANT CHANGE UP (ref 12–42)
ANION GAP SERPL CALC-SCNC: 8 MMOL/L — LOW (ref 9–16)
AST SERPL-CCNC: 14 U/L — LOW (ref 15–37)
BILIRUB SERPL-MCNC: 0.1 MG/DL — LOW (ref 0.2–1.2)
BUN SERPL-MCNC: 19 MG/DL — SIGNIFICANT CHANGE UP (ref 7–23)
CALCIUM SERPL-MCNC: 9.1 MG/DL — SIGNIFICANT CHANGE UP (ref 8.5–10.5)
CHLORIDE SERPL-SCNC: 108 MMOL/L — SIGNIFICANT CHANGE UP (ref 96–108)
CO2 SERPL-SCNC: 28 MMOL/L — SIGNIFICANT CHANGE UP (ref 22–31)
CREAT SERPL-MCNC: 1.13 MG/DL — SIGNIFICANT CHANGE UP (ref 0.5–1.3)
CRP SERPL-MCNC: 0.6 MG/DL — SIGNIFICANT CHANGE UP (ref 0–0.9)
D DIMER BLD IA.RAPID-MCNC: <187 NG/ML DDU — SIGNIFICANT CHANGE UP
GLUCOSE SERPL-MCNC: 98 MG/DL — SIGNIFICANT CHANGE UP (ref 70–99)
HCG UR QL: NEGATIVE — SIGNIFICANT CHANGE UP
HCT VFR BLD CALC: 37.4 % — SIGNIFICANT CHANGE UP (ref 34.5–45)
HGB BLD-MCNC: 12.8 G/DL — SIGNIFICANT CHANGE UP (ref 11.5–15.5)
MCHC RBC-ENTMCNC: 28.8 PG — SIGNIFICANT CHANGE UP (ref 27–34)
MCHC RBC-ENTMCNC: 34.2 G/DL — SIGNIFICANT CHANGE UP (ref 32–36)
MCV RBC AUTO: 84.2 FL — SIGNIFICANT CHANGE UP (ref 80–100)
PCP SPEC-MCNC: SIGNIFICANT CHANGE UP
PLATELET # BLD AUTO: 330 K/UL — SIGNIFICANT CHANGE UP (ref 150–400)
POTASSIUM SERPL-MCNC: 3.9 MMOL/L — SIGNIFICANT CHANGE UP (ref 3.5–5.3)
POTASSIUM SERPL-SCNC: 3.9 MMOL/L — SIGNIFICANT CHANGE UP (ref 3.5–5.3)
PROT SERPL-MCNC: 7 G/DL — SIGNIFICANT CHANGE UP (ref 6.4–8.2)
RBC # BLD: 4.44 M/UL — SIGNIFICANT CHANGE UP (ref 3.8–5.2)
RBC # FLD: 13.5 % — SIGNIFICANT CHANGE UP (ref 10.3–14.5)
SODIUM SERPL-SCNC: 144 MMOL/L — SIGNIFICANT CHANGE UP (ref 132–145)
TROPONIN I SERPL-MCNC: <0.017 NG/ML — LOW (ref 0.02–0.06)
WBC # BLD: 11.7 K/UL — HIGH (ref 3.8–10.5)
WBC # FLD AUTO: 11.7 K/UL — HIGH (ref 3.8–10.5)

## 2019-08-31 PROCEDURE — 99284 EMERGENCY DEPT VISIT MOD MDM: CPT | Mod: 25

## 2019-08-31 PROCEDURE — 93010 ELECTROCARDIOGRAM REPORT: CPT

## 2019-08-31 RX ORDER — ACETAMINOPHEN 500 MG
975 TABLET ORAL ONCE
Refills: 0 | Status: COMPLETED | OUTPATIENT
Start: 2019-08-31 | End: 2019-08-31

## 2019-08-31 RX ORDER — KETOROLAC TROMETHAMINE 30 MG/ML
30 SYRINGE (ML) INJECTION ONCE
Refills: 0 | Status: DISCONTINUED | OUTPATIENT
Start: 2019-08-31 | End: 2019-08-31

## 2019-08-31 RX ORDER — DIAZEPAM 5 MG
5 TABLET ORAL ONCE
Refills: 0 | Status: DISCONTINUED | OUTPATIENT
Start: 2019-08-31 | End: 2019-08-31

## 2019-08-31 RX ORDER — GABAPENTIN 400 MG/1
600 CAPSULE ORAL ONCE
Refills: 0 | Status: COMPLETED | OUTPATIENT
Start: 2019-08-31 | End: 2019-08-31

## 2019-08-31 RX ADMIN — Medication 975 MILLIGRAM(S): at 22:50

## 2019-08-31 RX ADMIN — GABAPENTIN 600 MILLIGRAM(S): 400 CAPSULE ORAL at 22:51

## 2019-08-31 RX ADMIN — Medication 5 MILLIGRAM(S): at 22:50

## 2019-08-31 RX ADMIN — Medication 30 MILLIGRAM(S): at 22:51

## 2019-08-31 NOTE — ED ADULT TRIAGE NOTE - CHIEF COMPLAINT QUOTE
Pt walked into ed with steady gait c.o left side weakness 6 days. Pt states "I have a history of sciatica and take gabapentin for it. I had cp in left chest that lasted for 25 minutes and today I started having left hand pain and weakness". Pt has noted weakness in left hand  when compared to right. No facial droop or slurred speech noted. no drift noted to bl upper arms.

## 2019-08-31 NOTE — ED ADULT NURSE NOTE - NSIMPLEMENTINTERV_GEN_ALL_ED
Implemented All Universal Safety Interventions:  [place] to call system. Call bell, personal items and telephone within reach. Instruct patient to call for assistance. Room bathroom lighting operational. Non-slip footwear when patient is off stretcher. Physically safe environment: no spills, clutter or unnecessary equipment. Stretcher in lowest position, wheels locked, appropriate side rails in place. 63yM w/ pmhx DM, HTN, HLD, hx SBO, schizophrenia presenting with nausea, vomiting, weakness starting this AM. CT abd to r/o obstruction, cardiac enzymes and EKG r/o ACS, fingerstick 214 will give fluids, urine lytes to assess possible hyponatremia given hx, labs and reassess

## 2019-08-31 NOTE — ED PROVIDER NOTE - PHYSICAL EXAMINATION
Vital Signs - nursing notes reviewed and confirmed  Gen - WDWN F, NAD, comfortable and non-toxic appearing, speaking in full sentences   Skin - warm, dry, intact  HEENT - AT/NC, PERRL, EOMI, no conjunctival injection, moist oral mucosa, TM intact b/l with good cone of lights, o/p clear with no erythema, edema, or exudate, uvula midline, airway patent, neck supple and NT, FROM, L anterior cervical region with slight TTP, no bruits, JVD or crepitus noted, no midline tenderness or step off   CV - S1S2, R/R/R  Resp - respiration non-labored, CTAB, symmetric bs b/l, no r/r/w  GI - NABS, soft, ND, NT, no rebound or guarding, no CVAT b/l   MS - w/w/p, no c/c/e, calves supple and NT, distal pulses symmetric b/l, brisk cap refills, +SILT  Neuro - AxOx3, no focal neuro deficits, CN II-XII grossly intact, cerebellar function intact, negative pronator drift, negative nystagmus, ambulatory without gait disturbance, strength 4/5 LUE, 5/5 RUE, 5/5 BLE

## 2019-08-31 NOTE — ED PROVIDER NOTE - OBJECTIVE STATEMENT
37 yo F with PMHx of anxiety, depression, multilevel disc disease with sciatica (mostly affecting L side of the body), usually takes gabapentin and motrin PRN for pain, ran out of meds 1 month ago, presenting c/o worsening L sided CP, neck, and upper extremity pain x 2d.  Pt reports having progressive worsening pain on the L side of the body, from lower back to LLE in the past few months.  Noted similar pain now in the neck, L sided of the chest and L upper arm in the past few days.  Didn't have any more pain meds at home and couldn't stand the pain today.  Reports sharp, constant pain to the L chest region radiating to the neck, upper back and LUE with associated decreased in strength and tingling sensation 2/2 pain.  Denies HA, dizziness, numbness, photophobia, diplopia, change in vision/hearing/gait/mental status/speech, rash, fever, chills, stiffness, CP, SOB, palpitations, diaphoresis, N/V/D/C, abdominal pain, change in urinary/bowel function, dysuria, hematuria, flank pain, trauma, fall, and malaise. No recent sick contact or travel noted

## 2019-08-31 NOTE — ED PROVIDER NOTE - CLINICAL SUMMARY MEDICAL DECISION MAKING FREE TEXT BOX
pt with h/o sciatica and degenerative disc dz, noted progressive worsening L sided pain for months, now with L sided CP radiating to the neck and arm with weakness/decreased strength 2/2 pain, no other focal neuro deficits, sensation intact, imaging ordered to r/o SVC/upper extremity thrombus, infectious etiology, cord compression, however patient appears to have eloped from the ED while waiting for imaging.  Entire ED and waiting area searched by staff.  Patient not found.  Will have SW attempt to contact patient.

## 2019-09-01 PROBLEM — D21.9 BENIGN NEOPLASM OF CONNECTIVE AND OTHER SOFT TISSUE, UNSPECIFIED: Chronic | Status: ACTIVE | Noted: 2019-06-09

## 2019-09-01 PROBLEM — N80.9 ENDOMETRIOSIS, UNSPECIFIED: Chronic | Status: ACTIVE | Noted: 2019-06-09

## 2019-09-01 PROBLEM — M54.30 SCIATICA, UNSPECIFIED SIDE: Chronic | Status: ACTIVE | Noted: 2019-06-09

## 2019-09-05 DIAGNOSIS — R07.89 OTHER CHEST PAIN: ICD-10-CM

## 2019-09-05 DIAGNOSIS — G62.9 POLYNEUROPATHY, UNSPECIFIED: ICD-10-CM

## 2019-09-05 DIAGNOSIS — R53.1 WEAKNESS: ICD-10-CM

## 2019-09-10 NOTE — ED BEHAVIORAL HEALTH NOTE - BEHAVIORAL HEALTH NOTE
Post D/C note: SW was asked to follow up with the patient. Per the notes the patient Eloped from the ED while waiting for imaging. The entire ED and waiting room were searched and the patient could not be found. This writer attempted to contact the patient but she could not be reached at this time and a message was left for her.

## 2020-07-05 ENCOUNTER — EMERGENCY (EMERGENCY)
Facility: HOSPITAL | Age: 40
LOS: 1 days | Discharge: HOME | End: 2020-07-05
Admitting: EMERGENCY MEDICINE
Payer: MEDICAID

## 2020-07-05 VITALS
HEIGHT: 65 IN | DIASTOLIC BLOOD PRESSURE: 85 MMHG | RESPIRATION RATE: 18 BRPM | OXYGEN SATURATION: 97 % | TEMPERATURE: 97 F | HEART RATE: 96 BPM | SYSTOLIC BLOOD PRESSURE: 117 MMHG | WEIGHT: 175.05 LBS

## 2020-07-05 VITALS
DIASTOLIC BLOOD PRESSURE: 80 MMHG | HEART RATE: 77 BPM | SYSTOLIC BLOOD PRESSURE: 119 MMHG | OXYGEN SATURATION: 99 % | TEMPERATURE: 98 F | RESPIRATION RATE: 18 BRPM

## 2020-07-05 PROCEDURE — 28470 CLTX METATARSAL FX WO MNP EA: CPT | Mod: 54

## 2020-07-05 PROCEDURE — 73700 CT LOWER EXTREMITY W/O DYE: CPT | Mod: 26,RT

## 2020-07-05 PROCEDURE — 99285 EMERGENCY DEPT VISIT HI MDM: CPT | Mod: 25,57

## 2020-07-05 PROCEDURE — 73630 X-RAY EXAM OF FOOT: CPT | Mod: 26,RT

## 2020-07-05 PROCEDURE — 73562 X-RAY EXAM OF KNEE 3: CPT | Mod: 26,50

## 2020-07-05 PROCEDURE — 73610 X-RAY EXAM OF ANKLE: CPT | Mod: 26,RT

## 2020-07-05 RX ORDER — OXYCODONE AND ACETAMINOPHEN 5; 325 MG/1; MG/1
1 TABLET ORAL ONCE
Refills: 0 | Status: DISCONTINUED | OUTPATIENT
Start: 2020-07-05 | End: 2020-07-05

## 2020-07-05 RX ORDER — MORPHINE SULFATE 50 MG/1
4 CAPSULE, EXTENDED RELEASE ORAL ONCE
Refills: 0 | Status: DISCONTINUED | OUTPATIENT
Start: 2020-07-05 | End: 2020-07-05

## 2020-07-05 RX ORDER — IBUPROFEN 200 MG
1 TABLET ORAL
Qty: 28 | Refills: 0
Start: 2020-07-05

## 2020-07-05 RX ORDER — KETOROLAC TROMETHAMINE 30 MG/ML
60 SYRINGE (ML) INJECTION ONCE
Refills: 0 | Status: DISCONTINUED | OUTPATIENT
Start: 2020-07-05 | End: 2020-07-05

## 2020-07-05 RX ADMIN — MORPHINE SULFATE 4 MILLIGRAM(S): 50 CAPSULE, EXTENDED RELEASE ORAL at 11:47

## 2020-07-05 RX ADMIN — OXYCODONE AND ACETAMINOPHEN 1 TABLET(S): 5; 325 TABLET ORAL at 13:19

## 2020-07-05 RX ADMIN — Medication 60 MILLIGRAM(S): at 11:47
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
## 2020-07-05 NOTE — ED PROVIDER NOTE - NSFOLLOWUPINSTRUCTIONS_ED_ALL_ED_FT
A fracture is a break in one of your bones. This can occur from a variety of injuries, especially traumatic ones. Symptoms include pain, bruising, or swelling. Do not use the injured limb. If a fracture is in one of the bones below your waist, do not put weight on that limb unless instructed to do so by your healthcare provider. Crutches or a cane may have been provided. A splint or cast may have been applied by your health care provider. Make sure to keep it dry and follow up with an orthopedist as instructed.    SEEK IMMEDIATE MEDICAL CARE IF YOU HAVE ANY OF THE FOLLOWING SYMPTOMS: numbness, tingling, increasing pain, or weakness in any part of the injured limb.     Take pain medications as prescribed as needed.
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
## 2020-07-05 NOTE — ED PROVIDER NOTE - PHYSICAL EXAMINATION
VITAL SIGNS: I have reviewed nursing notes and confirm.  CONSTITUTIONAL: Well-developed; well-nourished; in no acute distress.  SKIN: Skin is warm and dry, no acute rash.  RESP: Breathing comfortably on room air with normal RR.  EXT: RLE: +swelling with ecchymosis to mid foot. Skin is intact with tenderness to palpation over mid foot. Pt able to move all digits. Distal pulses intact. +Unable to bear weight due to pain.   NEURO: Alert, oriented. Grossly unremarkable.  PSYCH: Cooperative, appropriate. CONSTITUTIONAL: Well-appearing; well-nourished; in no apparent distress.   	HEAD: Normocephalic; atraumatic.   	EYES:  conjunctiva and sclera clear  	ENT: normal nose; no rhinorrhea; normal pharynx with no erythema or lesions.   	NECK: Supple; non-tender;   	CARDIOVASCULAR: Normal S1, S2; no murmurs, rubs, or gallops. Regular rate and rhythm.   	RESPIRATORY: Breathing easily; breath sounds clear and equal bilaterally; no wheezes, rhonchi, or rales.  	GI: Soft; non-distended; non-tender; no cvat bilaterally  	EXT: RLE: +swelling with ecchymosis to mid foot. Skin is intact. +tenderness to palpation over mid foot. able to move all digits. Distal pulses intact. no sensory deficits. soft compartments. Unable to bear weight due to pain.   +bilateral superficial abrasions to knees, minimal bony tenderness to knees, good ROM knees and hip, nvi distally.   	SKIN: Normal for age and race; warm; dry; good turgor; no apparent lesions or rash.   	NEURO: A & O x 3; face symmetric; grossly unremarkable.   PSYCHOLOGICAL: The patient’s mood and manner are appropriate.

## 2020-07-05 NOTE — ED ADULT NURSE NOTE - NSIMPLEMENTINTERV_GEN_ALL_ED
Implemented All Fall Risk Interventions:  [place] to call system. Call bell, personal items and telephone within reach. Instruct patient to call for assistance. Room bathroom lighting operational. Non-slip footwear when patient is off stretcher. Physically safe environment: no spills, clutter or unnecessary equipment. Stretcher in lowest position, wheels locked, appropriate side rails in place. Provide visual cue, wrist band, yellow gown, etc. Monitor gait and stability. Monitor for mental status changes and reorient to person, place, and time. Review medications for side effects contributing to fall risk. Reinforce activity limits and safety measures with patient and family.
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
## 2020-07-05 NOTE — ED ADULT NURSE REASSESSMENT NOTE - NS ED NURSE REASSESS COMMENT FT1
Pt continues to c.o pain at this time in right foot. Pt reassessment delayed d/t pt in xray and CT. PA notified. WIll continue to monitor.

## 2020-07-05 NOTE — ED PROVIDER NOTE - CARE PROVIDER_API CALL
Gab Salgado  ORTHOPAEDIC SURGERY  [address], [address] [place] [address]  Phone: [phone]  Fax: [phone]  Follow Up Time:     Vazquez Amin)  Orthopedics  [address] [place] [address]  Phone: [phone]  Fax: [phone]  Follow Up Time:

## 2020-07-05 NOTE — ED PROVIDER NOTE - PATIENT PORTAL LINK FT
You can access the FollowMyHealth Patient Portal offered by [facility] by registering at the following website: [email or web address]. By joining [facility]’s FollowMyHealth portal, you will also be able to view your health information using other applications (apps) compatible with our system.

## 2020-07-05 NOTE — ED PROVIDER NOTE - CARE PROVIDERS DIRECT ADDRESSES
[email or web address]

## 2020-07-07 PROBLEM — Z00.00 ENCOUNTER FOR PREVENTIVE HEALTH EXAMINATION: Status: ACTIVE | Noted: 2020-07-07

## 2020-07-08 ENCOUNTER — APPOINTMENT (OUTPATIENT)
Dept: ORTHOPEDIC SURGERY | Facility: CLINIC | Age: 40
End: 2020-07-08
Payer: MEDICAID

## 2020-07-08 VITALS — BODY MASS INDEX: 29.16 KG/M2 | HEIGHT: 65 IN | WEIGHT: 175 LBS | RESPIRATION RATE: 16 BRPM

## 2020-07-08 DIAGNOSIS — Z78.9 OTHER SPECIFIED HEALTH STATUS: ICD-10-CM

## 2020-07-08 DIAGNOSIS — Z80.9 FAMILY HISTORY OF MALIGNANT NEOPLASM, UNSPECIFIED: ICD-10-CM

## 2020-07-08 DIAGNOSIS — F17.200 NICOTINE DEPENDENCE, UNSPECIFIED, UNCOMPLICATED: ICD-10-CM

## 2020-07-08 PROCEDURE — 99204 OFFICE O/P NEW MOD 45 MIN: CPT | Mod: 25

## 2020-07-08 PROCEDURE — 29405 APPL SHORT LEG CAST: CPT | Mod: RT

## 2020-07-09 DIAGNOSIS — S92.321A DISPLACED FRACTURE OF SECOND METATARSAL BONE, RIGHT FOOT, INITIAL ENCOUNTER FOR CLOSED FRACTURE: ICD-10-CM
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
## 2020-07-09 DIAGNOSIS — M79.671 PAIN IN RIGHT FOOT: ICD-10-CM
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
## 2020-07-09 LAB
ALBUMIN SERPL ELPH-MCNC: 4.1 G/DL
ALP BLD-CCNC: 82 U/L
ALT SERPL-CCNC: 19 U/L
ANION GAP SERPL CALC-SCNC: 10 MMOL/L
APTT BLD: 32.1 SEC
AST SERPL-CCNC: 21 U/L
BILIRUB SERPL-MCNC: 0.2 MG/DL
BUN SERPL-MCNC: 12 MG/DL
CALCIUM SERPL-MCNC: 9.8 MG/DL
CHLORIDE SERPL-SCNC: 106 MMOL/L
CO2 SERPL-SCNC: 25 MMOL/L
CREAT SERPL-MCNC: 1.01 MG/DL
GLUCOSE SERPL-MCNC: 89 MG/DL
INR PPP: 0.85 RATIO
POTASSIUM SERPL-SCNC: 5.1 MMOL/L
PROT SERPL-MCNC: 6.7 G/DL
PT BLD: 10.2 SEC
SODIUM SERPL-SCNC: 141 MMOL/L

## 2020-07-13 ENCOUNTER — LABORATORY RESULT (OUTPATIENT)
Age: 40
End: 2020-07-13

## 2020-07-13 ENCOUNTER — TRANSCRIPTION ENCOUNTER (OUTPATIENT)
Age: 40
End: 2020-07-13

## 2020-07-14 ENCOUNTER — APPOINTMENT (OUTPATIENT)
Dept: ORTHOPEDIC SURGERY | Facility: AMBULATORY SURGERY CENTER | Age: 40
End: 2020-07-14

## 2020-07-14 ENCOUNTER — OUTPATIENT (OUTPATIENT)
Dept: OUTPATIENT SERVICES | Facility: HOSPITAL | Age: 40
LOS: 1 days | Discharge: HOME | End: 2020-07-14
Payer: MEDICAID

## 2020-07-14 PROCEDURE — 28465 OPTX TARSAL BONE FX EACH: CPT | Mod: RT

## 2020-07-14 PROCEDURE — 28615 REPAIR FOOT DISLOCATION: CPT | Mod: RT

## 2020-07-14 PROCEDURE — 28555 REPAIR FOOT DISLOCATION: CPT | Mod: RT

## 2020-07-14 PROCEDURE — 76000 FLUOROSCOPY <1 HR PHYS/QHP: CPT | Mod: 26

## 2020-07-14 PROCEDURE — 28485 OPTX METATARSAL FX EACH: CPT | Mod: T6

## 2020-07-14 PROCEDURE — 77071 MNL APPL STRS JT RADIOGRAPHY: CPT

## 2020-07-17 ENCOUNTER — TRANSCRIPTION ENCOUNTER (OUTPATIENT)
Age: 40
End: 2020-07-17

## 2020-07-20 ENCOUNTER — TRANSCRIPTION ENCOUNTER (OUTPATIENT)
Age: 40
End: 2020-07-20

## 2020-07-23 ENCOUNTER — OUTPATIENT (OUTPATIENT)
Dept: OUTPATIENT SERVICES | Facility: HOSPITAL | Age: 40
LOS: 1 days | End: 2020-07-23
Payer: MEDICAID

## 2020-07-23 ENCOUNTER — APPOINTMENT (OUTPATIENT)
Dept: RADIOLOGY | Facility: CLINIC | Age: 40
End: 2020-07-23

## 2020-07-23 ENCOUNTER — RESULT REVIEW (OUTPATIENT)
Age: 40
End: 2020-07-23

## 2020-07-23 ENCOUNTER — APPOINTMENT (OUTPATIENT)
Dept: ORTHOPEDIC SURGERY | Facility: CLINIC | Age: 40
End: 2020-07-23
Payer: MEDICAID

## 2020-07-23 PROCEDURE — 99024 POSTOP FOLLOW-UP VISIT: CPT

## 2020-07-23 PROCEDURE — 73630 X-RAY EXAM OF FOOT: CPT | Mod: 26,RT

## 2020-07-23 PROCEDURE — 29405 APPL SHORT LEG CAST: CPT | Mod: 58,RT

## 2020-07-29 ENCOUNTER — APPOINTMENT (OUTPATIENT)
Dept: ORTHOPEDIC SURGERY | Facility: CLINIC | Age: 40
End: 2020-07-29
Payer: MEDICAID

## 2020-07-29 VITALS — WEIGHT: 175 LBS | HEIGHT: 65 IN | BODY MASS INDEX: 29.16 KG/M2 | RESPIRATION RATE: 16 BRPM

## 2020-07-29 PROCEDURE — 99024 POSTOP FOLLOW-UP VISIT: CPT

## 2020-07-29 PROCEDURE — 29405 APPL SHORT LEG CAST: CPT | Mod: 58,RT

## 2020-07-29 RX ORDER — OXYCODONE 5 MG/1
5 TABLET ORAL
Qty: 40 | Refills: 0 | Status: DISCONTINUED | COMMUNITY
Start: 2020-07-12 | End: 2020-07-29

## 2020-07-29 RX ORDER — OXYCODONE 5 MG/1
5 TABLET ORAL
Qty: 40 | Refills: 0 | Status: DISCONTINUED | COMMUNITY
Start: 2020-07-08 | End: 2020-07-29

## 2020-07-30 ENCOUNTER — TRANSCRIPTION ENCOUNTER (OUTPATIENT)
Age: 40
End: 2020-07-30

## 2020-07-30 RX ORDER — ACETAMINOPHEN AND CODEINE 300; 30 MG/1; MG/1
300-30 TABLET ORAL
Qty: 20 | Refills: 0 | Status: DISCONTINUED | COMMUNITY
Start: 2020-07-08 | End: 2020-07-30

## 2020-07-31 ENCOUNTER — APPOINTMENT (OUTPATIENT)
Dept: ORTHOPEDIC SURGERY | Facility: CLINIC | Age: 40
End: 2020-07-31
Payer: MEDICAID

## 2020-07-31 DIAGNOSIS — Z48.00 ENCOUNTER FOR CHANGE OR REMOVAL OF NONSURGICAL WOUND DRESSING: ICD-10-CM

## 2020-07-31 DIAGNOSIS — M79.671 PAIN IN RIGHT FOOT: ICD-10-CM

## 2020-07-31 PROCEDURE — 99024 POSTOP FOLLOW-UP VISIT: CPT

## 2020-07-31 PROCEDURE — 29405 APPL SHORT LEG CAST: CPT | Mod: 58,RT

## 2020-07-31 RX ORDER — CYCLOBENZAPRINE HYDROCHLORIDE 5 MG/1
5 TABLET, FILM COATED ORAL 3 TIMES DAILY
Qty: 12 | Refills: 0 | Status: DISCONTINUED | COMMUNITY
Start: 2020-07-30 | End: 2020-07-31

## 2020-07-31 RX ORDER — ACETAMINOPHEN AND CODEINE PHOSPHATE 60; 300 MG/1; MG/1
300-60 TABLET ORAL
Qty: 28 | Refills: 0 | Status: DISCONTINUED | COMMUNITY
Start: 2020-07-31 | End: 2020-07-31

## 2020-08-01 ENCOUNTER — TRANSCRIPTION ENCOUNTER (OUTPATIENT)
Age: 40
End: 2020-08-01

## 2020-08-05 ENCOUNTER — TRANSCRIPTION ENCOUNTER (OUTPATIENT)
Age: 40
End: 2020-08-05

## 2020-08-06 ENCOUNTER — TRANSCRIPTION ENCOUNTER (OUTPATIENT)
Age: 40
End: 2020-08-06

## 2020-08-07 ENCOUNTER — APPOINTMENT (OUTPATIENT)
Dept: ORTHOPEDIC SURGERY | Facility: CLINIC | Age: 40
End: 2020-08-07
Payer: MEDICAID

## 2020-08-07 PROCEDURE — 99024 POSTOP FOLLOW-UP VISIT: CPT

## 2020-08-07 RX ORDER — ACETAMINOPHEN AND CODEINE PHOSPHATE 60; 300 MG/1; MG/1
300-60 TABLET ORAL
Qty: 28 | Refills: 0 | Status: DISCONTINUED | COMMUNITY
Start: 2020-07-31 | End: 2020-08-07

## 2020-08-11 ENCOUNTER — TRANSCRIPTION ENCOUNTER (OUTPATIENT)
Age: 40
End: 2020-08-11

## 2020-08-12 ENCOUNTER — FORM ENCOUNTER (OUTPATIENT)
Age: 40
End: 2020-08-12

## 2020-08-13 ENCOUNTER — TRANSCRIPTION ENCOUNTER (OUTPATIENT)
Age: 40
End: 2020-08-13

## 2020-08-18 RX ORDER — MORPHINE SULFATE 15 MG/1
15 TABLET, FILM COATED, EXTENDED RELEASE ORAL
Qty: 14 | Refills: 0 | Status: DISCONTINUED | COMMUNITY
Start: 2020-07-13 | End: 2020-07-29

## 2020-08-19 ENCOUNTER — TRANSCRIPTION ENCOUNTER (OUTPATIENT)
Age: 40
End: 2020-08-19

## 2020-08-21 ENCOUNTER — TRANSCRIPTION ENCOUNTER (OUTPATIENT)
Age: 40
End: 2020-08-21

## 2020-08-21 RX ORDER — LINEZOLID 600 MG/1
600 TABLET, FILM COATED ORAL
Refills: 0 | Status: ACTIVE | COMMUNITY

## 2020-09-02 ENCOUNTER — OUTPATIENT (OUTPATIENT)
Dept: OUTPATIENT SERVICES | Facility: HOSPITAL | Age: 40
LOS: 1 days | End: 2020-09-02
Payer: MEDICAID

## 2020-09-02 ENCOUNTER — RESULT REVIEW (OUTPATIENT)
Age: 40
End: 2020-09-02

## 2020-09-02 ENCOUNTER — APPOINTMENT (OUTPATIENT)
Dept: ORTHOPEDIC SURGERY | Facility: CLINIC | Age: 40
End: 2020-09-02
Payer: MEDICAID

## 2020-09-02 ENCOUNTER — APPOINTMENT (OUTPATIENT)
Dept: MRI IMAGING | Facility: CLINIC | Age: 40
End: 2020-09-02

## 2020-09-02 ENCOUNTER — OUTPATIENT (OUTPATIENT)
Dept: OUTPATIENT SERVICES | Facility: HOSPITAL | Age: 40
LOS: 1 days | End: 2020-09-02
Payer: COMMERCIAL

## 2020-09-02 VITALS — HEIGHT: 65 IN | WEIGHT: 175 LBS | RESPIRATION RATE: 16 BRPM | BODY MASS INDEX: 29.16 KG/M2

## 2020-09-02 PROCEDURE — 73630 X-RAY EXAM OF FOOT: CPT | Mod: 26,RT

## 2020-09-02 PROCEDURE — 99214 OFFICE O/P EST MOD 30 MIN: CPT | Mod: 24

## 2020-09-02 PROCEDURE — 73630 X-RAY EXAM OF FOOT: CPT

## 2020-09-02 PROCEDURE — 73721 MRI JNT OF LWR EXTRE W/O DYE: CPT | Mod: 26,RT

## 2020-09-02 RX ORDER — ASPIRIN 325 MG/1
325 TABLET, FILM COATED ORAL TWICE DAILY
Qty: 60 | Refills: 0 | Status: DISCONTINUED | COMMUNITY
Start: 2020-07-12 | End: 2020-09-02

## 2020-09-02 RX ORDER — ONDANSETRON 4 MG/1
4 TABLET ORAL EVERY 8 HOURS
Qty: 15 | Refills: 0 | Status: DISCONTINUED | COMMUNITY
Start: 2020-07-12 | End: 2020-09-02

## 2020-09-02 RX ORDER — DOCUSATE SODIUM 100 MG/1
100 CAPSULE, LIQUID FILLED ORAL
Qty: 20 | Refills: 0 | Status: DISCONTINUED | COMMUNITY
Start: 2020-07-12 | End: 2020-09-02

## 2020-09-02 RX ORDER — OXYCODONE 10 MG/1
10 TABLET ORAL
Qty: 42 | Refills: 0 | Status: DISCONTINUED | COMMUNITY
Start: 2020-07-29 | End: 2020-09-02

## 2020-09-02 RX ORDER — ACETAMINOPHEN 325 MG/1
325 TABLET, FILM COATED ORAL
Qty: 120 | Refills: 0 | Status: DISCONTINUED | COMMUNITY
Start: 2020-08-07 | End: 2020-09-02

## 2020-09-08 ENCOUNTER — TRANSCRIPTION ENCOUNTER (OUTPATIENT)
Age: 40
End: 2020-09-08

## 2020-09-08 RX ORDER — HYDROCODONE BITARTRATE AND ACETAMINOPHEN 5; 325 MG/1; MG/1
5-325 TABLET ORAL
Qty: 40 | Refills: 0 | Status: ACTIVE | COMMUNITY
Start: 2020-09-08 | End: 1900-01-01

## 2020-09-14 ENCOUNTER — TRANSCRIPTION ENCOUNTER (OUTPATIENT)
Age: 40
End: 2020-09-14

## 2020-09-15 ENCOUNTER — APPOINTMENT (OUTPATIENT)
Dept: ORTHOPEDIC SURGERY | Facility: AMBULATORY SURGERY CENTER | Age: 40
End: 2020-09-15
Payer: MEDICAID

## 2020-09-15 ENCOUNTER — OUTPATIENT (OUTPATIENT)
Dept: OUTPATIENT SERVICES | Facility: HOSPITAL | Age: 40
LOS: 1 days | Discharge: HOME | End: 2020-09-15

## 2020-09-15 PROCEDURE — 28200 REPAIR OF FOOT TENDON: CPT | Mod: 78,RT

## 2020-09-15 PROCEDURE — 27691 REVISE LOWER LEG TENDON: CPT | Mod: 78,RT

## 2020-09-15 PROCEDURE — 20680 REMOVAL OF IMPLANT DEEP: CPT | Mod: 78,RT

## 2020-09-16 ENCOUNTER — TRANSCRIPTION ENCOUNTER (OUTPATIENT)
Age: 40
End: 2020-09-16

## 2020-09-17 ENCOUNTER — TRANSCRIPTION ENCOUNTER (OUTPATIENT)
Age: 40
End: 2020-09-17

## 2020-09-18 ENCOUNTER — TRANSCRIPTION ENCOUNTER (OUTPATIENT)
Age: 40
End: 2020-09-18

## 2020-09-24 ENCOUNTER — TRANSCRIPTION ENCOUNTER (OUTPATIENT)
Age: 40
End: 2020-09-24

## 2020-09-25 ENCOUNTER — TRANSCRIPTION ENCOUNTER (OUTPATIENT)
Age: 40
End: 2020-09-25

## 2020-09-25 DIAGNOSIS — R52 PAIN, UNSPECIFIED: ICD-10-CM

## 2020-09-28 ENCOUNTER — TRANSCRIPTION ENCOUNTER (OUTPATIENT)
Age: 40
End: 2020-09-28

## 2020-09-29 ENCOUNTER — TRANSCRIPTION ENCOUNTER (OUTPATIENT)
Age: 40
End: 2020-09-29

## 2020-09-30 ENCOUNTER — APPOINTMENT (OUTPATIENT)
Dept: ORTHOPEDIC SURGERY | Facility: CLINIC | Age: 40
End: 2020-09-30
Payer: MEDICAID

## 2020-09-30 PROCEDURE — 99024 POSTOP FOLLOW-UP VISIT: CPT

## 2020-09-30 PROCEDURE — 29405 APPL SHORT LEG CAST: CPT | Mod: 58,RT

## 2020-10-01 ENCOUNTER — TRANSCRIPTION ENCOUNTER (OUTPATIENT)
Age: 40
End: 2020-10-01

## 2020-10-04 ENCOUNTER — EMERGENCY (EMERGENCY)
Facility: HOSPITAL | Age: 40
LOS: 1 days | Discharge: HOME | End: 2020-10-04
Attending: EMERGENCY MEDICINE | Admitting: EMERGENCY MEDICINE
Payer: MEDICAID

## 2020-10-04 VITALS
HEIGHT: 65 IN | SYSTOLIC BLOOD PRESSURE: 115 MMHG | OXYGEN SATURATION: 98 % | DIASTOLIC BLOOD PRESSURE: 76 MMHG | WEIGHT: 175.05 LBS | HEART RATE: 83 BPM | TEMPERATURE: 99 F | RESPIRATION RATE: 16 BRPM

## 2020-10-04 DIAGNOSIS — Z46.89 ENCOUNTER FOR FITTING AND ADJUSTMENT OF OTHER SPECIFIED DEVICES: ICD-10-CM

## 2020-10-04 PROCEDURE — 99282 EMERGENCY DEPT VISIT SF MDM: CPT

## 2020-10-04 NOTE — ED PROVIDER NOTE - CLINICAL SUMMARY MEDICAL DECISION MAKING FREE TEXT BOX
40 y/o female presents with a cracked foot cast on right lower extremity. Will refer to Dr. Plummer to replace the cast. 38 y/o female presents with a cracked foot cast on right lower extremity. Will refer to Dr. Salgado to replace the cast.

## 2020-10-04 NOTE — ED PROVIDER NOTE - PATIENT PORTAL LINK FT
You can access the FollowMyHealth Patient Portal offered by [facility] by registering at the following website: [email or web address]. By joining [facility]’s FollowMyHealth portal, you will also be able to view your health information using other applications (apps) compatible with our system.

## 2020-10-04 NOTE — ED ADULT NURSE NOTE - OBJECTIVE STATEMENT
Patient, 40 yo, female, came to this unit requesting right foot cast split. Pt. instructed by her MD to come in for new cast. Pt states that she hold her foot on the floor and herd the cast breaking. Pt. denies fever, pain, sob. Pt no apparent distress. Pt. instructed by her MD to come in because she split open her cast. Pt denies tingling, numbness, swelling and pain.

## 2020-10-04 NOTE — ED PROVIDER NOTE - PROGRESS NOTE DETAILS
Spoke with Dr. Plummer and he stated he will reach out tomorrow to replace the cast. Spoke with Dr. Salgado and he stated he will reach out tomorrow to replace the cast.

## 2020-10-04 NOTE — ED PROVIDER NOTE - CARE PROVIDER_API CALL
Gab Salgado  ORTHOPAEDIC SURGERY  [address] [place] [address]  Phone: [phone]  Fax: [phone]  Follow Up Time:

## 2020-10-04 NOTE — ED ADULT NURSE NOTE - NSIMPLEMENTINTERV_GEN_ALL_ED
Implemented All Fall with Harm Risk Interventions:  [place] to call system. Call bell, personal items and telephone within reach. Instruct patient to call for assistance. Room bathroom lighting operational. Non-slip footwear when patient is off stretcher. Physically safe environment: no spills, clutter or unnecessary equipment. Stretcher in lowest position, wheels locked, appropriate side rails in place. Provide visual cue, wrist band, yellow gown, etc. Monitor gait and stability. Monitor for mental status changes and reorient to person, place, and time. Review medications for side effects contributing to fall risk. Reinforce activity limits and safety measures with patient and family. Provide visual clues: red socks.

## 2020-10-04 NOTE — ED PROVIDER NOTE - OBJECTIVE STATEMENT
40 y/o female presents to ED with a cracked foot cast at the heel of the right lower extremity. Patient states she was instructed by her MD to come in to ED for a new cast. Denies falling or any recent injuries and any other medical complaints.

## 2020-10-05 ENCOUNTER — RX RENEWAL (OUTPATIENT)
Age: 40
End: 2020-10-05

## 2020-10-05 ENCOUNTER — TRANSCRIPTION ENCOUNTER (OUTPATIENT)
Age: 40
End: 2020-10-05

## 2020-10-07 ENCOUNTER — APPOINTMENT (OUTPATIENT)
Dept: ORTHOPEDIC SURGERY | Facility: CLINIC | Age: 40
End: 2020-10-07
Payer: MEDICAID

## 2020-10-07 VITALS — RESPIRATION RATE: 16 BRPM | WEIGHT: 175 LBS | BODY MASS INDEX: 29.16 KG/M2 | HEIGHT: 65 IN
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
## 2020-10-07 DIAGNOSIS — Z91.19 PATIENT'S NONCOMPLIANCE WITH OTHER MEDICAL TREATMENT AND REGIMEN: ICD-10-CM

## 2020-10-07 PROCEDURE — 99024 POSTOP FOLLOW-UP VISIT: CPT

## 2020-10-12 ENCOUNTER — TRANSCRIPTION ENCOUNTER (OUTPATIENT)
Age: 40
End: 2020-10-12

## 2020-10-12 RX ORDER — ACETAMINOPHEN 325 MG/1
325 TABLET, FILM COATED ORAL
Qty: 120 | Refills: 0 | Status: ACTIVE | COMMUNITY
Start: 2020-10-12 | End: 1900-01-01

## 2020-11-02 ENCOUNTER — TRANSCRIPTION ENCOUNTER (OUTPATIENT)
Age: 40
End: 2020-11-02

## 2020-11-11 ENCOUNTER — RESULT REVIEW (OUTPATIENT)
Age: 40
End: 2020-11-11

## 2020-11-11 ENCOUNTER — APPOINTMENT (OUTPATIENT)
Dept: ORTHOPEDIC SURGERY | Facility: CLINIC | Age: 40
End: 2020-11-11
Payer: MEDICAID

## 2020-11-11 ENCOUNTER — OUTPATIENT (OUTPATIENT)
Dept: OUTPATIENT SERVICES | Facility: HOSPITAL | Age: 40
LOS: 1 days | End: 2020-11-11
Payer: COMMERCIAL

## 2020-11-11 PROCEDURE — 73610 X-RAY EXAM OF ANKLE: CPT | Mod: 26,RT

## 2020-11-11 PROCEDURE — 73630 X-RAY EXAM OF FOOT: CPT

## 2020-11-11 PROCEDURE — 99024 POSTOP FOLLOW-UP VISIT: CPT

## 2020-11-11 PROCEDURE — 73630 X-RAY EXAM OF FOOT: CPT | Mod: 26,RT

## 2020-11-11 PROCEDURE — 73610 X-RAY EXAM OF ANKLE: CPT

## 2020-11-11 NOTE — ED PROVIDER NOTE - PROGRESS NOTE DETAILS
Initial (On Arrival)
Improved after medication.  Patient reports that this pain is something she has been suffering for some time now with every cycle.  It has been attributed to fibroids and endometriosis and patient has made the decision to have an elective hysterectomy.  Patient has a private GYN that she is following up with about this.  This pain is exactly like prior, just getting worse with each cycle.  Patient requests a small amount of stronger pain medication which I will prescribe.  I have discussed all necessary precautions.  Importance of close followup and precautions for immediate return discussed. -- RADHA

## 2020-11-12 ENCOUNTER — EMERGENCY (EMERGENCY)
Facility: HOSPITAL | Age: 40
LOS: 1 days | Discharge: HOME | End: 2020-11-12
Admitting: EMERGENCY MEDICINE
Payer: MEDICAID

## 2020-11-12 VITALS
RESPIRATION RATE: 17 BRPM | TEMPERATURE: 98 F | SYSTOLIC BLOOD PRESSURE: 128 MMHG | DIASTOLIC BLOOD PRESSURE: 70 MMHG | HEART RATE: 74 BPM | HEIGHT: 65 IN | WEIGHT: 171.96 LBS | OXYGEN SATURATION: 97 %

## 2020-11-12 VITALS
TEMPERATURE: 98 F | OXYGEN SATURATION: 98 % | HEART RATE: 67 BPM | RESPIRATION RATE: 16 BRPM | SYSTOLIC BLOOD PRESSURE: 124 MMHG | DIASTOLIC BLOOD PRESSURE: 81 MMHG

## 2020-11-12 LAB
ALBUMIN SERPL ELPH-MCNC: 2.8 G/DL — LOW (ref 3.4–5)
ALP SERPL-CCNC: 88 U/L — SIGNIFICANT CHANGE UP (ref 40–120)
ALT FLD-CCNC: 13 U/L — SIGNIFICANT CHANGE UP (ref 12–42)
ANION GAP SERPL CALC-SCNC: 10 MMOL/L — SIGNIFICANT CHANGE UP (ref 9–16)
APPEARANCE UR: CLEAR — SIGNIFICANT CHANGE UP
APTT BLD: 25.7 SEC — LOW (ref 27.5–35.5)
AST SERPL-CCNC: 38 U/L — HIGH (ref 15–37)
BASOPHILS # BLD AUTO: 0.04 K/UL — SIGNIFICANT CHANGE UP (ref 0–0.2)
BASOPHILS NFR BLD AUTO: 0.3 % — SIGNIFICANT CHANGE UP (ref 0–2)
BILIRUB SERPL-MCNC: 0.4 MG/DL — SIGNIFICANT CHANGE UP (ref 0.2–1.2)
BILIRUB UR-MCNC: NEGATIVE — SIGNIFICANT CHANGE UP
BUN SERPL-MCNC: 11 MG/DL — SIGNIFICANT CHANGE UP (ref 7–23)
CALCIUM SERPL-MCNC: 8.8 MG/DL — SIGNIFICANT CHANGE UP (ref 8.5–10.5)
CHLORIDE SERPL-SCNC: 103 MMOL/L — SIGNIFICANT CHANGE UP (ref 96–108)
CO2 SERPL-SCNC: 23 MMOL/L — SIGNIFICANT CHANGE UP (ref 22–31)
COLOR SPEC: YELLOW — SIGNIFICANT CHANGE UP
CREAT SERPL-MCNC: 0.66 MG/DL — SIGNIFICANT CHANGE UP (ref 0.5–1.3)
DIFF PNL FLD: NEGATIVE — SIGNIFICANT CHANGE UP
EOSINOPHIL # BLD AUTO: 0.25 K/UL — SIGNIFICANT CHANGE UP (ref 0–0.5)
EOSINOPHIL NFR BLD AUTO: 2 % — SIGNIFICANT CHANGE UP (ref 0–6)
GLUCOSE SERPL-MCNC: 94 MG/DL — SIGNIFICANT CHANGE UP (ref 70–99)
GLUCOSE UR QL: NEGATIVE — SIGNIFICANT CHANGE UP
HCG SERPL-ACNC: 1647 MIU/ML — HIGH
HCT VFR BLD CALC: 33.8 % — LOW (ref 34.5–45)
HGB BLD-MCNC: 10.8 G/DL — LOW (ref 11.5–15.5)
IMM GRANULOCYTES NFR BLD AUTO: 0.6 % — SIGNIFICANT CHANGE UP (ref 0–1.5)
INR BLD: 0.91 — SIGNIFICANT CHANGE UP (ref 0.88–1.16)
KETONES UR-MCNC: NEGATIVE — SIGNIFICANT CHANGE UP
LEUKOCYTE ESTERASE UR-ACNC: NEGATIVE — SIGNIFICANT CHANGE UP
LYMPHOCYTES # BLD AUTO: 27 % — SIGNIFICANT CHANGE UP (ref 13–44)
LYMPHOCYTES # BLD AUTO: 3.41 K/UL — HIGH (ref 1–3.3)
MAGNESIUM SERPL-MCNC: 2 MG/DL — SIGNIFICANT CHANGE UP (ref 1.6–2.6)
MCHC RBC-ENTMCNC: 26.9 PG — LOW (ref 27–34)
MCHC RBC-ENTMCNC: 32 GM/DL — SIGNIFICANT CHANGE UP (ref 32–36)
MCV RBC AUTO: 84.3 FL — SIGNIFICANT CHANGE UP (ref 80–100)
MONOCYTES # BLD AUTO: 1.05 K/UL — HIGH (ref 0–0.9)
MONOCYTES NFR BLD AUTO: 8.3 % — SIGNIFICANT CHANGE UP (ref 2–14)
NEUTROPHILS # BLD AUTO: 7.79 K/UL — HIGH (ref 1.8–7.4)
NEUTROPHILS NFR BLD AUTO: 61.8 % — SIGNIFICANT CHANGE UP (ref 43–77)
NITRITE UR-MCNC: NEGATIVE — SIGNIFICANT CHANGE UP
NRBC # BLD: 0 /100 WBCS — SIGNIFICANT CHANGE UP (ref 0–0)
PH UR: 6 — SIGNIFICANT CHANGE UP (ref 5–8)
PLATELET # BLD AUTO: 292 K/UL — SIGNIFICANT CHANGE UP (ref 150–400)
POTASSIUM SERPL-MCNC: 5.1 MMOL/L — SIGNIFICANT CHANGE UP (ref 3.5–5.3)
POTASSIUM SERPL-SCNC: 5.1 MMOL/L — SIGNIFICANT CHANGE UP (ref 3.5–5.3)
PROT SERPL-MCNC: 7.3 G/DL — SIGNIFICANT CHANGE UP (ref 6.4–8.2)
PROT UR-MCNC: NEGATIVE MG/DL — SIGNIFICANT CHANGE UP
PROTHROM AB SERPL-ACNC: 11 SEC — SIGNIFICANT CHANGE UP (ref 10.6–13.6)
RBC # BLD: 4.01 M/UL — SIGNIFICANT CHANGE UP (ref 3.8–5.2)
RBC # FLD: 14.9 % — HIGH (ref 10.3–14.5)
SARS-COV-2 RNA SPEC QL NAA+PROBE: SIGNIFICANT CHANGE UP
SODIUM SERPL-SCNC: 136 MMOL/L — SIGNIFICANT CHANGE UP (ref 132–145)
SP GR SPEC: >=1.03 — SIGNIFICANT CHANGE UP (ref 1–1.03)
UROBILINOGEN FLD QL: 0.2 E.U./DL — SIGNIFICANT CHANGE UP
WBC # BLD: 12.61 K/UL — HIGH (ref 3.8–10.5)
WBC # FLD AUTO: 12.61 K/UL — HIGH (ref 3.8–10.5)

## 2020-11-12 PROCEDURE — 99285 EMERGENCY DEPT VISIT HI MDM: CPT

## 2020-11-12 PROCEDURE — 76856 US EXAM PELVIC COMPLETE: CPT | Mod: 26

## 2020-11-12 PROCEDURE — 76830 TRANSVAGINAL US NON-OB: CPT | Mod: 26

## 2020-11-12 RX ORDER — OXYCODONE AND ACETAMINOPHEN 5; 325 MG/1; MG/1
1 TABLET ORAL ONCE
Refills: 0 | Status: DISCONTINUED | OUTPATIENT
Start: 2020-11-12 | End: 2020-11-12

## 2020-11-12 RX ORDER — METRONIDAZOLE 500 MG
1 TABLET ORAL
Qty: 28 | Refills: 0
Start: 2020-11-12 | End: 2020-11-25

## 2020-11-12 RX ORDER — CEFTRIAXONE 500 MG/1
1000 INJECTION, POWDER, FOR SOLUTION INTRAMUSCULAR; INTRAVENOUS ONCE
Refills: 0 | Status: COMPLETED | OUTPATIENT
Start: 2020-11-12 | End: 2020-11-12

## 2020-11-12 RX ORDER — METRONIDAZOLE 500 MG
500 TABLET ORAL ONCE
Refills: 0 | Status: COMPLETED | OUTPATIENT
Start: 2020-11-12 | End: 2020-11-12

## 2020-11-12 RX ADMIN — OXYCODONE AND ACETAMINOPHEN 1 TABLET(S): 5; 325 TABLET ORAL at 17:21

## 2020-11-12 RX ADMIN — Medication 100 MILLIGRAM(S): at 17:33

## 2020-11-12 RX ADMIN — Medication 500 MILLIGRAM(S): at 17:33

## 2020-11-12 RX ADMIN — OXYCODONE AND ACETAMINOPHEN 1 TABLET(S): 5; 325 TABLET ORAL at 14:41

## 2020-11-12 RX ADMIN — CEFTRIAXONE 100 MILLIGRAM(S): 500 INJECTION, POWDER, FOR SOLUTION INTRAMUSCULAR; INTRAVENOUS at 17:33

## 2020-11-12 NOTE — ED PROVIDER NOTE - CHPI ED SYMPTOMS NEG
no pain/no chills/no vomiting/no decreased eating/drinking/no dizziness/no fever/no nausea/no numbness/no tingling/no weakness

## 2020-11-12 NOTE — ED PROVIDER NOTE - PATIENT PORTAL LINK FT
You can access the FollowMyHealth Patient Portal offered by [facility] by registering at the following website: [email or web address]. By joining [facility]’s FollowMyHealth portal, you will also be able to view your health information using other applications (apps) compatible with our system.

## 2020-11-12 NOTE — ED PROVIDER NOTE - NSFOLLOWUPINSTRUCTIONS_ED_ALL_ED_FT
Endometritis    WHAT YOU NEED TO KNOW:    What is endometritis? Endometritis is inflammation of the lining of your uterus.    Female Reproductive System         What causes endometritis? Infection is the most common cause. Any of the following can increase your risk for infection:   •Amniotic fluid that becomes infected before or during labor      •Meconium (first bowel movement) passed by your baby before you give birth      •Pelvic inflammatory disease (infection of female organs)      •Placenta or other tissue left inside your womb after delivery or a miscarriage      •A sexually transmitted infection (STI) from having sex with an infected partner      What increases my risk for endometritis?   •A  section () to deliver your baby      •Anemia (lack of red blood cells) that develops from large blood loss during a C-seciton      •Infection in your womb early in your pregnancy      •Procedures or tests used to check inside your uterus with tools that irritate the lining      •A long or difficult labor       •Steroid medicine given if your baby will be born too early (before 37 weeks)      What are the signs and symptoms of endometritis?   •Abnormal vaginal bleeding      •Fever      •Increased and foul-smelling vaginal discharge      •Pain in your lower abdomen or perineum (area between your vagina and anus)       •Pain during sex      How is endometritis treated or prevented?   •Antibiotics fight or prevent an infection caused by bacteria. If you are pregnant, antibiotics may be given before you deliver.      •Evacuation is done to remove the tissues left in your womb after giving birth or an .      •Needle aspiration may be needed to drain an abscess in your abdomen. A needle may be placed through your abdomen or vagina and used to remove the pus.      •Surgery may also be done to remove pus and infected tissue.      When should I call my doctor?   •You feel lightheaded or you have fainted.      •You have vaginal bleeding that is not your monthly period.      •Your symptoms become worse, even after you start treatment with medicine.      •You have a fever.      •Your symptoms come back after treatment.      •You have questions or concerns about your condition or care.      CARE AGREEMENT:    You have the right to help plan your care. Learn about your health condition and how it may be treated. Discuss treatment options with your healthcare providers to decide what care you want to receive. You always have the right to refuse treatment.           Abdominal Pain    Many things can cause abdominal pain. Many times, abdominal pain is not caused by a disease and will improve without treatment. Your health care provider will do a physical exam to determine if there is a dangerous cause of your pain; blood tests and imaging may help determine the cause of your pain. However, in many cases, no cause may be found and you may need further testing as an outpatient. Monitor your abdominal pain for any changes.     SEEK IMMEDIATE MEDICAL CARE IF YOU HAVE ANY OF THE FOLLOWING SYMPTOMS: worsening abdominal pain, uncontrollable vomiting, profuse diarrhea, inability to have bowel movements or pass gas, black or bloody stools, fever accompanying chest pain or back pain, or fainting. These symptoms may represent a serious problem that is an emergency. Do not wait to see if the symptoms will go away. Get medical help right away. Call 911 and do not drive yourself to the hospital.

## 2020-11-12 NOTE — ED PROVIDER NOTE - CLINICAL SUMMARY MEDICAL DECISION MAKING FREE TEXT BOX
Patient here s/p  with abd cramping and vaginal spotting.   US no retained products   Exam blood in vault   Plan: Treat for endometritis and follow up with OBGYN. Patient stable with normal vitals in ED entire stay.   Patient understands plan

## 2020-11-12 NOTE — ED ADULT TRIAGE NOTE - CHIEF COMPLAINT QUOTE
patient here for vaginal bleeding and cramping since Saturday; had  Saturday afternoon and cramping started ; patient notes clots on pad; took 1 oxycodone at 5am for pain

## 2020-11-12 NOTE — ED PROVIDER NOTE - OBJECTIVE STATEMENT
41 y/o female hx of recent  last week now here c/o of abdominal cramping and bleeding. Denies fever, chills, hematuria, , abdominal pain, change in bowel function, flank pain, rash, HA, dizziness, SOB, CP, palpitations, diaphoresis, cough, and malaise. Patient states she has been cramping since the procedure on saturday. Appears well NAD stable.

## 2020-11-12 NOTE — ED ADULT NURSE NOTE - CHPI ED NUR SYMPTOMS NEG
no discharge/no nausea/no abdominal pain/no back pain/no pain/no vaginal discharge/no vomiting/no coffee grounds emesis/no fever

## 2020-11-16 DIAGNOSIS — N93.9 ABNORMAL UTERINE AND VAGINAL BLEEDING, UNSPECIFIED: ICD-10-CM

## 2020-11-16 DIAGNOSIS — Z20.828 CONTACT WITH AND (SUSPECTED) EXPOSURE TO OTHER VIRAL COMMUNICABLE DISEASES: ICD-10-CM

## 2020-11-19 ENCOUNTER — TRANSCRIPTION ENCOUNTER (OUTPATIENT)
Age: 40
End: 2020-11-19

## 2020-11-20 ENCOUNTER — TRANSCRIPTION ENCOUNTER (OUTPATIENT)
Age: 40
End: 2020-11-20

## 2020-12-02 ENCOUNTER — TRANSCRIPTION ENCOUNTER (OUTPATIENT)
Age: 40
End: 2020-12-02

## 2020-12-04 ENCOUNTER — EMERGENCY (EMERGENCY)
Facility: HOSPITAL | Age: 40
LOS: 1 days | Discharge: HOME | End: 2020-12-04
Admitting: EMERGENCY MEDICINE
Payer: MEDICAID

## 2020-12-04 VITALS
WEIGHT: 175.05 LBS | OXYGEN SATURATION: 96 % | RESPIRATION RATE: 18 BRPM | DIASTOLIC BLOOD PRESSURE: 79 MMHG | HEART RATE: 92 BPM | HEIGHT: 65 IN | SYSTOLIC BLOOD PRESSURE: 139 MMHG | TEMPERATURE: 99 F

## 2020-12-04 DIAGNOSIS — N93.9 ABNORMAL UTERINE AND VAGINAL BLEEDING, UNSPECIFIED: ICD-10-CM

## 2020-12-04 PROCEDURE — 99284 EMERGENCY DEPT VISIT MOD MDM: CPT

## 2020-12-04 NOTE — ED ADULT TRIAGE NOTE - CHIEF COMPLAINT QUOTE
Pt states she has her period x 1 week and had an  last month and changed 5 pads in 1 hr with clots. Hx of fibroids

## 2020-12-05 VITALS
OXYGEN SATURATION: 99 % | SYSTOLIC BLOOD PRESSURE: 127 MMHG | HEART RATE: 88 BPM | TEMPERATURE: 99 F | RESPIRATION RATE: 18 BRPM | DIASTOLIC BLOOD PRESSURE: 80 MMHG

## 2020-12-05 LAB
ALBUMIN SERPL ELPH-MCNC: 3.2 G/DL — LOW (ref 3.4–5)
ALP SERPL-CCNC: 93 U/L — SIGNIFICANT CHANGE UP (ref 40–120)
ALT FLD-CCNC: 19 U/L — SIGNIFICANT CHANGE UP (ref 12–42)
ANION GAP SERPL CALC-SCNC: 10 MMOL/L — SIGNIFICANT CHANGE UP (ref 9–16)
APPEARANCE UR: ABNORMAL
APTT BLD: 30.3 SEC — SIGNIFICANT CHANGE UP (ref 27.5–35.5)
AST SERPL-CCNC: 15 U/L — SIGNIFICANT CHANGE UP (ref 15–37)
BACTERIA # UR AUTO: PRESENT /HPF
BASOPHILS # BLD AUTO: 0.05 K/UL — SIGNIFICANT CHANGE UP (ref 0–0.2)
BASOPHILS NFR BLD AUTO: 0.5 % — SIGNIFICANT CHANGE UP (ref 0–2)
BILIRUB SERPL-MCNC: 0.1 MG/DL — LOW (ref 0.2–1.2)
BILIRUB UR-MCNC: NEGATIVE — SIGNIFICANT CHANGE UP
BUN SERPL-MCNC: 23 MG/DL — SIGNIFICANT CHANGE UP (ref 7–23)
CALCIUM SERPL-MCNC: 9.1 MG/DL — SIGNIFICANT CHANGE UP (ref 8.5–10.5)
CHLORIDE SERPL-SCNC: 105 MMOL/L — SIGNIFICANT CHANGE UP (ref 96–108)
CO2 SERPL-SCNC: 24 MMOL/L — SIGNIFICANT CHANGE UP (ref 22–31)
COLOR SPEC: ABNORMAL
CREAT SERPL-MCNC: 1.14 MG/DL — SIGNIFICANT CHANGE UP (ref 0.5–1.3)
DIFF PNL FLD: ABNORMAL
EOSINOPHIL # BLD AUTO: 0.44 K/UL — SIGNIFICANT CHANGE UP (ref 0–0.5)
EOSINOPHIL NFR BLD AUTO: 4.4 % — SIGNIFICANT CHANGE UP (ref 0–6)
EPI CELLS # UR: SIGNIFICANT CHANGE UP /HPF (ref 0–5)
GLUCOSE SERPL-MCNC: 95 MG/DL — SIGNIFICANT CHANGE UP (ref 70–99)
GLUCOSE UR QL: NEGATIVE — SIGNIFICANT CHANGE UP
HCG SERPL-ACNC: 5 MIU/ML — HIGH
HCT VFR BLD CALC: 37 % — SIGNIFICANT CHANGE UP (ref 34.5–45)
HGB BLD-MCNC: 11.5 G/DL — SIGNIFICANT CHANGE UP (ref 11.5–15.5)
IMM GRANULOCYTES NFR BLD AUTO: 1.2 % — SIGNIFICANT CHANGE UP (ref 0–1.5)
INR BLD: 0.9 — SIGNIFICANT CHANGE UP (ref 0.88–1.16)
KETONES UR-MCNC: ABNORMAL MG/DL
LEUKOCYTE ESTERASE UR-ACNC: ABNORMAL
LYMPHOCYTES # BLD AUTO: 3.26 K/UL — SIGNIFICANT CHANGE UP (ref 1–3.3)
LYMPHOCYTES # BLD AUTO: 32.7 % — SIGNIFICANT CHANGE UP (ref 13–44)
MCHC RBC-ENTMCNC: 26.5 PG — LOW (ref 27–34)
MCHC RBC-ENTMCNC: 31.1 GM/DL — LOW (ref 32–36)
MCV RBC AUTO: 85.3 FL — SIGNIFICANT CHANGE UP (ref 80–100)
MONOCYTES # BLD AUTO: 1.14 K/UL — HIGH (ref 0–0.9)
MONOCYTES NFR BLD AUTO: 11.4 % — SIGNIFICANT CHANGE UP (ref 2–14)
NEUTROPHILS # BLD AUTO: 4.95 K/UL — SIGNIFICANT CHANGE UP (ref 1.8–7.4)
NEUTROPHILS NFR BLD AUTO: 49.8 % — SIGNIFICANT CHANGE UP (ref 43–77)
NITRITE UR-MCNC: POSITIVE
NRBC # BLD: 0 /100 WBCS — SIGNIFICANT CHANGE UP (ref 0–0)
PH UR: 5 — SIGNIFICANT CHANGE UP (ref 5–8)
PLATELET # BLD AUTO: 366 K/UL — SIGNIFICANT CHANGE UP (ref 150–400)
POTASSIUM SERPL-MCNC: 4 MMOL/L — SIGNIFICANT CHANGE UP (ref 3.5–5.3)
POTASSIUM SERPL-SCNC: 4 MMOL/L — SIGNIFICANT CHANGE UP (ref 3.5–5.3)
PROT SERPL-MCNC: 7.3 G/DL — SIGNIFICANT CHANGE UP (ref 6.4–8.2)
PROT UR-MCNC: 100 MG/DL
PROTHROM AB SERPL-ACNC: 10.9 SEC — SIGNIFICANT CHANGE UP (ref 10.6–13.6)
RBC # BLD: 4.34 M/UL — SIGNIFICANT CHANGE UP (ref 3.8–5.2)
RBC # FLD: 14.7 % — HIGH (ref 10.3–14.5)
RBC CASTS # UR COMP ASSIST: ABNORMAL /HPF
SODIUM SERPL-SCNC: 139 MMOL/L — SIGNIFICANT CHANGE UP (ref 132–145)
SP GR SPEC: 1.02 — SIGNIFICANT CHANGE UP (ref 1–1.03)
UROBILINOGEN FLD QL: 1 E.U./DL — SIGNIFICANT CHANGE UP
WBC # BLD: 9.96 K/UL — SIGNIFICANT CHANGE UP (ref 3.8–10.5)
WBC # FLD AUTO: 9.96 K/UL — SIGNIFICANT CHANGE UP (ref 3.8–10.5)
WBC UR QL: < 5 /HPF — SIGNIFICANT CHANGE UP

## 2020-12-05 NOTE — ED PROVIDER NOTE - PATIENT PORTAL LINK FT
You can access the FollowMyHealth Patient Portal offered by [facility] by registering at the following website: [email or web address]. By joining [facility]’s FollowMyHealth portal, you will also be able to view your health information using other applications (apps) compatible with our system.

## 2020-12-05 NOTE — ED PROVIDER NOTE - CLINICAL SUMMARY MEDICAL DECISION MAKING FREE TEXT BOX
41 y/o F presents to ED c/o vaginal bleeding.  Pt well appearing, VSS, NAD.  Labs wnl.  Pt is not orthostatic.

## 2020-12-05 NOTE — ED ADULT NURSE NOTE - OBJECTIVE STATEMENT
Pt w/ PMH of uterine fibroids and recent surgical  x3 weeks ago presents to ED c/o increased vaginal bleeding x1 week that she believed was her menstrual period.  Pt endorses soaking x5 pads today w/ blood and clots.  Pt denies N/V, dizziness, pain or dysuria.  Pt endorses she had insertive vaginal intercourse x2 days after her surgical .  Pt pending labs.

## 2020-12-05 NOTE — ED PROVIDER NOTE - OBJECTIVE STATEMENT
39 y/o F presents to ED c/o vaginal bleeding 39 y/o F, PMH Of uterine fibroids, A2, s/p surgical elective  on 20 presents to ED c/o increased vaginal bleeding.  After the surgical , she started OCPs.  She had sexual intercourse with her parter 2 days after the .  Pt states she started bleeding more approx 6 days ago when she started taking the placebo medication of her OCPs.  She thinks this may be her menstrual cycle.  She started bleeding heavily at 9 pm, approx 4-5 hours pta.  She states she would go the bathroom and see blood clots.  Her peripad would be saturated.  She reports changing her peripad 5 times within one hour.  She now presents with concerns of heavy menstrual bleeding and verbalizes concern of pregnancy.

## 2020-12-05 NOTE — ED ADULT NURSE NOTE - CHPI ED NUR SYMPTOMS NEG
no abdominal pain/no discharge/no coffee grounds emesis/no fever/no nausea/no back pain/no pain/no vomiting

## 2020-12-05 NOTE — ED PROVIDER NOTE - CROS ED CONS ALL NEG
Department of Physical Medicine & Rehabilitation  History & Physical      Patient Identification:  Meena Humphrey  : 1944  Admit date: 2020   Attending provider: Hari Horn MD        Primary care provider: Lanney Landau     Chief Complaint: left side weakness     History of Present Illness/Hospital Course:  Patient is a 78 yo right handed M with pmh HTN, HLD, DM2, CKD, and prior left PCA stroke who initially presented 2020 with left side weakness resulting in fall and back pain. Imaging revealed acute/subacute ischemic right pontine infarct in addition to likely subacute lacunar infarct in left frontal lobe deep white matter and right posterior centrum semiovale. Also with chronic left PCA territory infarct with severe stenosis. Etiology thought to be microvascular vs embolic. Neurology recommending DAPT x 21 days and then monotherapy (on Plavix and statin at baseline). Also found to have L1 compression fracture which is being managed conservatively. Course complicated by uncontrolled HTN and DM2. Patient did have worsening of left side weakness yesterday, possibly due to relative hypotension. Therefore BP regimen was reduced. Now presents to ARU with impaired mobility, self-care, and cognition/communication below his baseline. Patient speaks Art Debra. Therefore history obtained via chart review and interpretation by son (patient/son refuse use of blue phone). Currently, patient reports ongoing left side weakness and slurred speech (worse than admission but stable since yesterday). He has vision change but is unable to further characterize. He denies headache, tingling/numbness. Son reports ongoing intermittent confusion/disorientation.  Patient motivated to start inpatient rehab program.      Prior Level of Function:  Supervision for ambulating with cane, bathing, dressing, toileting     Current Level of Function:  Min-mod assist for mobility  Mod-max assist for ADLs     Pertinent Social History:  Support: Lives with Family(wife, son, grand children and other family members)  Home set-up: Bi-level home with 3+5 steps to enter    Past Medical History:   Diagnosis Date    [facility]erebral artery occlusion [facility] infarction ([facility] [facility]     Diabetes mellitus ([facility] [facility]     Gallstone     GERD (gastroesophageal reflux disease)     Hyperlipidemia     Hypertension     Renal insufficiency        Past Surgical History:   Procedure Laterality Date    APPENDECTOMY      CHOLECYSTECTOMY      COLONOSCOPY N/A 5/8/2020    COLONOSCOPY POLYPECTOMY SNARE/COLD BIOPSY performed by Bryan Toledo MD at [facility]  06/08/2018    UPPER GASTROINTESTINAL ENDOSCOPY N/A 2/28/2019    EGD W/EUS FNA performed by Mario Alberto Rocha MD at [facility] N/A 4/23/2019    EGD BIOPSY performed by Mario Alberto Rocha MD at [address] [facility] N/A 4/23/2019    EGD W/EUS FNA performed by Mario Alberto Rocha MD at [address] [facility] N/A 4/29/2020    EGD BIOPSY performed by Bryan Toledo MD at [address] [place] [address] Rd History   Problem Relation Age of Onset    No Known Problems Mother     No Known Problems Father        Social History     Socioeconomic History    Marital status:      Spouse name: Bren Mo Number of children: 8    Years of education: None    Highest education level: None   Occupational History    Occupation: farmer   Social Needs    Financial resource strain: None    Food insecurity     Worry: None     Inability: None    Transportation needs     Medical: None     Non-medical: None   Tobacco Use    Smoking status: Never Smoker    Smokeless tobacco: Never Used   Substance and Sexual Activity    Alcohol use: No    Drug use: No    Sexual activity: Yes     Comment: frank   Lifestyle    Physical activity     Days per week: None     Minutes per session: None    Stress: None   Relationships    Social connections     Talks on phone: None     Gets together: None     Attends Sabianist service: None     Active member of club or organization: None     Attends meetings of clubs or organizations: None     Relationship status: None    Intimate partner violence     Fear of current or ex partner: None     Emotionally abused: None     Physically abused: None     Forced sexual activity: None   Other Topics Concern    None   Social History Narrative    None       Allergies   Allergen Reactions    Atorvastatin Other (See Comments)     Myalgia -- stopped 11/2019 to see if pain improves. Possible PMR diagnosis as well. Will follow-up in 3 mo.          Current Facility-Administered Medications   Medication Dose Route Frequency Provider Last Rate Last Dose    [START ON 9/26/2020] aspirin EC tablet 81 mg  81 mg Oral Daily Valentín Duarte MD        Or   [employer] [START ON 9/26/2020] aspirin suppository 300 mg  300 mg Rectal Daily Valentín Duarte MD        [facility]) tablet 12.5 mg  12.5 mg Oral Q6H PRN Valentín Duarte MD        Or    ondansetron [facility]) injection 4 mg  4 mg Intravenous Q6H PRN Valentín Duarte MD        sodium chloride flush 0.9 % injection 10 mL  10 mL Intravenous 2 times per day Valentín Duarte MD        sodium chloride flush 0.9 % injection 10 mL  10 mL Intravenous PRN Valentín Duarte MD        dextrose 5 % solution  100 mL/hr Intravenous PRN Valentín Duarte MD        dextrose 50 % IV solution  12.5 g Intravenous PRN Valentín Duarte MD        glucagon (rDNA) injection 1 mg  1 mg Intramuscular PRN Valentín Duarte MD        glucose (GLUTOSE) 40 % oral gel 15 g  15 g Oral PRN Valentín Duarte MD        acetaminophen (TYLENOL) tablet 650 mg  650 mg Oral Q6H PRN Valentín Duarte MD        enoxaparin (LOVENOX) injection 40 mg  40 mg Subcutaneous Nightly Valentín Duarte MD   40 mg at 09/25/20 2053    magnesium hydroxide (MILK OF MAGNESIA) 400 MG/5ML suspension 30 mL  30 mL Oral Daily PRN Humphrey Euceda MD        polyethylene glycol [facility]) packet 17 g  17 g Oral Daily PRN Humphrey Euceda MD        sennosides-docusate sodium (SENOKOT-S) 8.6-50 MG tablet 1 tablet  1 tablet Oral BID Humphrey Euceda MD   1 tablet at 09/25/20 2053    bisacodyl (DULCOLAX) suppository 10 mg  10 mg Rectal Daily PRN Humphrey Euceda MD        [START ON 9/26/2020] clopidogrel (PLAVIX) tablet 75 mg  75 mg Oral Daily Humphrey Euceda MD        [START ON 9/26/2020] DULoxetine (CYMBALTA) extended release capsule 30 mg  30 mg Oral Daily Humphrey Euceda MD        insulin glargine (LANTUS) injection vial 12 Units  12 Units Subcutaneous Nightly Humphrey Euceda MD   12 Units at 09/25/20 2053    insulin lispro (HUMALOG) injection vial 0-12 Units  0-12 Units Subcutaneous TID [facility] Humphrey Euceda MD   4 Units at 09/25/20 1725    insulin lispro (HUMALOG) injection vial 0-6 Units  0-6 Units Subcutaneous Nightly Humphrey Euceda MD   2 Units at 09/25/20 2052    [START ON 9/26/2020] losartan (COZAAR) tablet 25 mg  25 mg Oral Daily Humphrey Euceda MD        melatonin tablet 3 mg  3 mg Oral Nightly PRN Humphrey Euceda MD   3 mg at 09/25/20 2107    [START ON 9/26/2020] pantoprazole (PROTONIX) tablet 40 mg  40 mg Oral BID AC Humphrey Euceda MD        [START ON 9/26/2020] rosuvastatin (CRESTOR) tablet 10 mg  10 mg Oral Daily Humphrey Euceda MD        sucralfate (CARAFATE) tablet 1 g  1 g Oral 3 times per day Humphrey Euceda MD   1 g at 09/25/20 2222    tamsulosin (FLOMAX) capsule 0.4 mg  0.4 mg Oral Nightly Humphrey Euceda MD   0.4 mg at 09/25/20 2053    traMADol (ULTRAM) tablet 50 mg  50 mg Oral Q6H PRN Humphrey Eucead MD   50 mg at 09/25/20 2107    Or    traMADol (ULTRAM) tablet 100 mg  100 mg Oral Q6H PRN Humphrey Euceda MD             REVIEW OF SYSTEMS:   CONSTITUTIONAL: negative for fevers, chills, turgor. No rashes or breakdown noted. Ext: No significant edema appreciated. No varicosities. MSK: Decreased spine ROM. No joint tenderness, erythema, warmth noted. Neuro: Alert, oriented to person and hospital, some confusion about time and situation. Difficult to fully assess recall/attn due to language barrier. Speech dysarthric per son. Cranial nerve exam notable for mild left facial droop, otherwise intact. Sensation intact to light touch. Motor examination reveals strength 5/5 in RUE and RLE except 4/5 hip flexion, 3/5 LUE and LLE. Unable to perform FTN on the left due to weakness. Reflexes diminished, symmetric. Psych: Stable mood, normal judgement, normal affect     Lab Results   Component Value Date    WBC 7.6 09/25/2020    HGB 15.1 09/25/2020    HCT 46.8 09/25/2020    MCV 78.6 (L) 09/25/2020     09/25/2020     Lab Results   Component Value Date    INR 0.92 09/22/2020    INR 0.92 05/24/2016    INR 0.96 05/01/2016    PROTIME 10.7 09/22/2020    PROTIME 10.5 05/24/2016    PROTIME 10.9 05/01/2016     Lab Results   Component Value Date    CREATININE 1.3 09/25/2020    BUN 16 09/25/2020     (L) 09/25/2020    K 4.2 09/25/2020     09/25/2020    CO2 15 (L) 09/25/2020     Lab Results   Component Value Date    ALT 16 09/22/2020    AST 23 09/22/2020    ALKPHOS 107 09/22/2020    BILITOT 0.7 09/22/2020       Most recent echocardiogram revealed:  Ejection fraction is visually estimated to be 55-60%. Indeterminate diastolic function.    Normal function of all valves.     Most recent EKG revealed:  Sinus tachycardia   Poor data quality, interpretation may be adversely affectedLeft anterior fascicular blockLeft ventricular hypertrophy with repolarization abnormalityAbnormal ECGWhen compared with ECG of 26-AUG-2020 23:23,No significant change likelyConfirmed by Emilie Barker MD, Anabel Kim ([id number]) on 9/22/2020 4:50:35 PM     Most recent imaging studies revealed:     MRI brain  Right paramedian pontine acute to subacute infarct.  No hemorrhagic conversion         Additionally areas of likely subacute lacunar infarct involving the left    frontal lobe deep white matter as well as the right posterior centrum    semiovale         Chronic small ischemic disease and age related change.          CTA head and neck  50% stenosis of proximal left internal carotid artery.         Severe stenosis of proximal left posterior cerebral artery.         XRay lumbar spine  1. Multilevel degenerative disc disease and facet joint arthropathy    2. Mild anterior wedging L1 not evident on previous CT done 04/28/2020 could    represent a mild acute compression fracture.         On my review, CXR displays right basilar atelectasis. The above laboratory data have been reviewed. The above imaging data have been reviewed. The above medical testing have been reviewed. Body mass index is 22.57 kg/m². POST ADMISSION PHYSICIAN EVALUATION  The patient has agreed to being admitted to our comprehensive inpatient rehabilitation facility and can tolerate the intensity of service consisting of at least:  --180 minutes of therapy a day, 5 out of 7 days a week. OR  --15 hours of intensive therapy within a 7 consecutive day period. The patient/family has a good understanding of our discharge process and will benefit from an interdisciplinary inpatient rehabilitation program. The patient has potential to make improvement and is in need of at least two of the following multidisciplinary therapies including but not limited to physical, occupational, respiratory, and speech, nutritional services, wound care, and prosthetics and orthotics. Given the patients complex condition and risk of further medical complications, rehabilitation services cannot be safely provided at a lower level of care such as a skilled nursing facility. All of the goals listed below were reviewed with the patient and he/she is in agreement.     I have compared the patients medical and functional status at the time of the preadmission screening and the same on this date, and there are no significant changes. By signing this document, I acknowledge that I have personally performed a full physical examination on this patient within 24 hours of admission to this inpatient rehabilitation facility and have determined the patient to be able to tolerate the above course of treatment at an intensive level for a reasonable period of time. I will be completing a detailed individualized  Plan of Care for this patient by day four of the patients stay based upon the Preadmission Screen, this Post-Admission Evaluation, and the therapy evaluations.      Barriers: left hemiparesis, decreased coordination, balance, endurance, cognition, dysarthria, dysphagia, medical comorbidities  Services Required: PT, OT, SLP  Goals: Ac-supervision for mobility, ADLs, cognition  Prognosis: Good  Anticipated Dispo: home with family  ELOS: 3 weeks    Rehabilitation Diagnosis:   Stroke, 1.1, Left Body (R Brain)      Assessment and Plan:    Impairments: left hemiparesis, decreased coordination, balance, endurance, cognition, dysarthria, dysphagia    Acute ischemic stroke  -Localization: Right son, posterior centrum semiolave, left frontal lobe deep white matter  -Etiology: small vessel vs embolic  -Telemetry in ARU, then event monitor at discharge  -Secondary ppx with DAPT x 3 weeks (then Plavix alone), statin, BP and glucose control  -PT/OT/SLP    H/o left PCA stroke with severe stenosis  -Secondary ppx as above    Dysphagia   -Dietitian and SLP consults.   -Currently on Minced/moist    HTN, uncontrolled  -Still allowing some permissive HTN due to worsening of symptoms with normalization of BP  -losartan    DM2, uncontrolled  -Lantus, ISS    CKD  -Avoid nephrotoxins, renally dose meds  -Monitor     L1 compression fracture  -Pain control  -PT/OT    Bladder   -High risk retention   -Monitor PVRs, SC prn >300cc  -Flomax    Bowel   -High risk constipation   -senna+colace BID, PRN miralax, MoM, and bisacodyl supp. Pain control  -prn tramasol    PPx  -DVT: lovenox  -GI: pantoprazole    FULL CODE    Caden Jang MD 9/25/2020, 6:06 PM negative...

## 2020-12-15 ENCOUNTER — TRANSCRIPTION ENCOUNTER (OUTPATIENT)
Age: 40
End: 2020-12-15

## 2020-12-16 ENCOUNTER — TRANSCRIPTION ENCOUNTER (OUTPATIENT)
Age: 40
End: 2020-12-16

## 2021-01-06 ENCOUNTER — APPOINTMENT (OUTPATIENT)
Dept: ORTHOPEDIC SURGERY | Facility: CLINIC | Age: 41
End: 2021-01-06
Payer: MEDICAID

## 2021-01-06 ENCOUNTER — OUTPATIENT (OUTPATIENT)
Dept: OUTPATIENT SERVICES | Facility: HOSPITAL | Age: 41
LOS: 1 days | End: 2021-01-06
Payer: COMMERCIAL

## 2021-01-06 ENCOUNTER — RESULT REVIEW (OUTPATIENT)
Age: 41
End: 2021-01-06

## 2021-01-06 DIAGNOSIS — S92.251A DISPLACED FRACTURE OF NAVICULAR [SCAPHOID] OF RIGHT FOOT, INITIAL ENCOUNTER FOR CLOSED FRACTURE: ICD-10-CM

## 2021-01-06 DIAGNOSIS — S93.324A DISLOCATION OF TARSOMETATARSAL JOINT OF RIGHT FOOT, INITIAL ENCOUNTER: ICD-10-CM

## 2021-01-06 DIAGNOSIS — S92.241A DISPLACED FRACTURE OF MEDIAL CUNEIFORM OF RIGHT FOOT, INITIAL ENCOUNTER FOR CLOSED FRACTURE: ICD-10-CM

## 2021-01-06 DIAGNOSIS — S92.321A DISPLACED FRACTURE OF SECOND METATARSAL BONE, RIGHT FOOT, INITIAL ENCOUNTER FOR CLOSED FRACTURE: ICD-10-CM

## 2021-01-06 PROCEDURE — 99072 ADDL SUPL MATRL&STAF TM PHE: CPT

## 2021-01-06 PROCEDURE — 73630 X-RAY EXAM OF FOOT: CPT | Mod: 26,RT

## 2021-01-06 PROCEDURE — 73630 X-RAY EXAM OF FOOT: CPT

## 2021-01-06 PROCEDURE — 99213 OFFICE O/P EST LOW 20 MIN: CPT

## 2021-01-06 RX ORDER — ONDANSETRON 4 MG/1
4 TABLET ORAL EVERY 8 HOURS
Qty: 15 | Refills: 0 | Status: DISCONTINUED | COMMUNITY
Start: 2020-09-11 | End: 2021-01-06

## 2021-01-06 RX ORDER — OXYCODONE HYDROCHLORIDE 10 MG/1
10 TABLET, FILM COATED, EXTENDED RELEASE ORAL
Qty: 20 | Refills: 0 | Status: DISCONTINUED | COMMUNITY
Start: 2020-09-15 | End: 2021-01-06

## 2021-01-06 RX ORDER — EUCALYP/ME-SALICYLATE/MEN/THYM
325 MOUTHWASH MUCOUS MEMBRANE
Qty: 60 | Refills: 0 | Status: DISCONTINUED | COMMUNITY
Start: 2020-10-05 | End: 2021-01-06

## 2021-01-06 RX ORDER — MELOXICAM 15 MG/1
15 TABLET ORAL
Qty: 90 | Refills: 0 | Status: ACTIVE | COMMUNITY
Start: 2021-01-06 | End: 1900-01-01

## 2021-01-06 RX ORDER — ASPIRIN 325 MG/1
325 TABLET, FILM COATED ORAL TWICE DAILY
Qty: 60 | Refills: 0 | Status: DISCONTINUED | COMMUNITY
Start: 2020-09-11 | End: 2021-01-06

## 2021-01-06 RX ORDER — OXYCODONE 10 MG/1
10 TABLET ORAL
Qty: 42 | Refills: 0 | Status: DISCONTINUED | COMMUNITY
Start: 2020-09-16 | End: 2021-01-06

## 2021-01-06 RX ORDER — DOCUSATE SODIUM 100 MG/1
100 CAPSULE ORAL TWICE DAILY
Qty: 28 | Refills: 0 | Status: DISCONTINUED | COMMUNITY
Start: 2020-09-11 | End: 2021-01-06

## 2021-03-17 ENCOUNTER — RESULT REVIEW (OUTPATIENT)
Age: 41
End: 2021-03-17

## 2021-03-17 ENCOUNTER — OUTPATIENT (OUTPATIENT)
Dept: OUTPATIENT SERVICES | Facility: HOSPITAL | Age: 41
LOS: 1 days | End: 2021-03-17
Payer: COMMERCIAL

## 2021-03-17 ENCOUNTER — APPOINTMENT (OUTPATIENT)
Dept: ORTHOPEDIC SURGERY | Facility: CLINIC | Age: 41
End: 2021-03-17
Payer: MEDICAID

## 2021-03-17 VITALS — BODY MASS INDEX: 29.16 KG/M2 | HEIGHT: 65 IN | RESPIRATION RATE: 16 BRPM | WEIGHT: 175 LBS

## 2021-03-17 DIAGNOSIS — S86.219A STRAIN OF MUSCLE(S) AND TENDON(S) OF ANTERIOR MUSCLE GROUP AT LOWER LEG LEVEL, UNSPECIFIED LEG, INITIAL ENCOUNTER: ICD-10-CM

## 2021-03-17 PROCEDURE — 73630 X-RAY EXAM OF FOOT: CPT | Mod: 26,RT

## 2021-03-17 PROCEDURE — 99214 OFFICE O/P EST MOD 30 MIN: CPT

## 2021-03-17 PROCEDURE — 99072 ADDL SUPL MATRL&STAF TM PHE: CPT

## 2021-03-17 PROCEDURE — 73630 X-RAY EXAM OF FOOT: CPT

## 2021-03-22 ENCOUNTER — LABORATORY RESULT (OUTPATIENT)
Age: 41
End: 2021-03-22

## 2021-03-22 RX ORDER — ASPIRIN 325 MG/1
325 TABLET, FILM COATED ORAL TWICE DAILY
Qty: 60 | Refills: 2 | Status: ACTIVE | COMMUNITY
Start: 2021-03-22 | End: 1900-01-01

## 2021-03-22 RX ORDER — ONDANSETRON 4 MG/1
4 TABLET ORAL EVERY 8 HOURS
Qty: 15 | Refills: 2 | Status: ACTIVE | COMMUNITY
Start: 2021-03-22 | End: 1900-01-01

## 2021-03-22 RX ORDER — DOCUSATE SODIUM 100 MG/1
100 CAPSULE ORAL TWICE DAILY
Qty: 28 | Refills: 3 | Status: ACTIVE | COMMUNITY
Start: 2021-03-22 | End: 1900-01-01

## 2021-03-24 ENCOUNTER — TRANSCRIPTION ENCOUNTER (OUTPATIENT)
Age: 41
End: 2021-03-24

## 2021-03-25 ENCOUNTER — OUTPATIENT (OUTPATIENT)
Dept: OUTPATIENT SERVICES | Facility: HOSPITAL | Age: 41
LOS: 1 days | Discharge: HOME | End: 2021-03-25
Payer: MEDICAID

## 2021-03-25 ENCOUNTER — APPOINTMENT (OUTPATIENT)
Dept: ORTHOPEDIC SURGERY | Facility: AMBULATORY SURGERY CENTER | Age: 41
End: 2021-03-25

## 2021-03-25 PROCEDURE — 77071 MNL APPL STRS JT RADIOGRAPHY: CPT

## 2021-03-25 PROCEDURE — 20680 REMOVAL OF IMPLANT DEEP: CPT | Mod: RT

## 2021-03-25 RX ORDER — GABAPENTIN 300 MG/1
300 CAPSULE ORAL DAILY
Qty: 110 | Refills: 0 | Status: ACTIVE | COMMUNITY
Start: 2021-03-25 | End: 1900-01-01

## 2021-03-26 ENCOUNTER — NON-APPOINTMENT (OUTPATIENT)
Age: 41
End: 2021-03-26

## 2021-04-07 ENCOUNTER — OUTPATIENT (OUTPATIENT)
Dept: OUTPATIENT SERVICES | Facility: HOSPITAL | Age: 41
LOS: 1 days | End: 2021-04-07
Payer: COMMERCIAL

## 2021-04-07 ENCOUNTER — APPOINTMENT (OUTPATIENT)
Dept: ORTHOPEDIC SURGERY | Facility: CLINIC | Age: 41
End: 2021-04-07
Payer: MEDICAID

## 2021-04-07 ENCOUNTER — TRANSCRIPTION ENCOUNTER (OUTPATIENT)
Age: 41
End: 2021-04-07

## 2021-04-07 ENCOUNTER — RESULT REVIEW (OUTPATIENT)
Age: 41
End: 2021-04-07

## 2021-04-07 VITALS — RESPIRATION RATE: 16 BRPM | WEIGHT: 175 LBS | BODY MASS INDEX: 29.16 KG/M2 | HEIGHT: 65 IN

## 2021-04-07 DIAGNOSIS — Z96.9 PRESENCE OF FUNCTIONAL IMPLANT, UNSPECIFIED: ICD-10-CM

## 2021-04-07 PROCEDURE — 99024 POSTOP FOLLOW-UP VISIT: CPT

## 2021-04-07 PROCEDURE — 73630 X-RAY EXAM OF FOOT: CPT | Mod: 26,RT

## 2021-04-07 PROCEDURE — 73630 X-RAY EXAM OF FOOT: CPT

## 2021-04-07 RX ORDER — OXYCODONE 5 MG/1
5 TABLET ORAL
Qty: 40 | Refills: 0 | Status: DISCONTINUED | COMMUNITY
Start: 2020-11-02 | End: 2021-04-07

## 2021-04-07 RX ORDER — OXYCODONE 10 MG/1
10 TABLET ORAL
Qty: 42 | Refills: 0 | Status: ACTIVE | COMMUNITY
Start: 2021-03-22 | End: 1900-01-01

## 2021-05-12 ENCOUNTER — APPOINTMENT (OUTPATIENT)
Dept: ORTHOPEDIC SURGERY | Facility: CLINIC | Age: 41
End: 2021-05-12
Payer: MEDICAID

## 2021-05-12 ENCOUNTER — OUTPATIENT (OUTPATIENT)
Dept: OUTPATIENT SERVICES | Facility: HOSPITAL | Age: 41
LOS: 1 days | End: 2021-05-12

## 2021-05-12 ENCOUNTER — RESULT REVIEW (OUTPATIENT)
Age: 41
End: 2021-05-12

## 2021-05-12 ENCOUNTER — APPOINTMENT (OUTPATIENT)
Dept: RADIOLOGY | Facility: CLINIC | Age: 41
End: 2021-05-12
Payer: MEDICAID

## 2021-05-12 VITALS — WEIGHT: 175 LBS | BODY MASS INDEX: 29.16 KG/M2 | RESPIRATION RATE: 16 BRPM | HEIGHT: 65 IN

## 2021-05-12 DIAGNOSIS — B35.3 TINEA PEDIS: ICD-10-CM

## 2021-05-12 DIAGNOSIS — T84.84XA PAIN DUE TO INTERNAL ORTHOPEDIC PROSTHETIC DEVICES, IMPLANTS AND GRAFTS, INITIAL ENCOUNTER: ICD-10-CM

## 2021-05-12 PROCEDURE — 73630 X-RAY EXAM OF FOOT: CPT | Mod: 26,RT

## 2021-05-12 PROCEDURE — 99024 POSTOP FOLLOW-UP VISIT: CPT

## 2021-05-12 RX ORDER — MELOXICAM 15 MG/1
15 TABLET ORAL
Qty: 90 | Refills: 0 | Status: ACTIVE | COMMUNITY
Start: 2021-05-12 | End: 1900-01-01

## 2021-05-15 ENCOUNTER — EMERGENCY (EMERGENCY)
Facility: HOSPITAL | Age: 41
LOS: 1 days | Discharge: HOME | End: 2021-05-15
Attending: EMERGENCY MEDICINE | Admitting: EMERGENCY MEDICINE
Payer: MEDICAID

## 2021-05-15 VITALS
HEIGHT: 65 IN | WEIGHT: 179.9 LBS | DIASTOLIC BLOOD PRESSURE: 82 MMHG | TEMPERATURE: 98 F | HEART RATE: 89 BPM | OXYGEN SATURATION: 98 % | SYSTOLIC BLOOD PRESSURE: 124 MMHG | RESPIRATION RATE: 18 BRPM

## 2021-05-15 DIAGNOSIS — F32.9 MAJOR DEPRESSIVE DISORDER, SINGLE EPISODE, UNSPECIFIED: ICD-10-CM

## 2021-05-15 DIAGNOSIS — F41.9 ANXIETY DISORDER, UNSPECIFIED: ICD-10-CM

## 2021-05-15 DIAGNOSIS — L30.1 DYSHIDROSIS [POMPHOLYX]: ICD-10-CM

## 2021-05-15 DIAGNOSIS — M79.671 PAIN IN RIGHT FOOT: ICD-10-CM

## 2021-05-15 PROCEDURE — 99283 EMERGENCY DEPT VISIT LOW MDM: CPT

## 2021-05-15 RX ORDER — HYDROCORTISONE 1 %
1 OINTMENT (GRAM) TOPICAL ONCE
Refills: 0 | Status: DISCONTINUED | OUTPATIENT
Start: 2021-05-15 | End: 2021-05-19

## 2021-05-15 RX ORDER — HYDROCORTISONE 1 %
1 OINTMENT (GRAM) TOPICAL
Qty: 30 | Refills: 0
Start: 2021-05-15 | End: 2021-06-13

## 2021-05-15 NOTE — ED PROVIDER NOTE - CLINICAL SUMMARY MEDICAL DECISION MAKING FREE TEXT BOX
Patient currently appears well, appears nontoxic, is in NAD, and can ambulate with steady, brisk, unassisted gait in the ED. Patient declined crutches, cane, orthoboot, or even any imaging when offered: "they already did all that. I got tons of CT scans already and they didn't find anything." Patient reports these symptoms are chronic in nature and is simply here for a second opinion after being referred to dermatology by 2 different podiatrists for this condition. Patient now agrees to see a dermatologist for a clobetasol and urea cream prescription and definitive care and will consider immunology and/or rheumatology to assess for any auto-immune etiology for this condition. No clinical signs of infection, systemic infection, or abscess/MRSA/cellulitis. Patient herself does not want any workup for infection or imaging as she reports that already has been ruled out by other clinicians and verbalizes understanding of risks of declining including any missed pathology, loss of limb, coma, sepsis, or death.

## 2021-05-15 NOTE — ED PROVIDER NOTE - SKIN RASH DESCRIPTION
no drainage, no serous drainage, no bleeding, no fluctuance/BLISTERED AREA/FLAKY/PATCHY AREAS/RAISED

## 2021-05-15 NOTE — ED PROVIDER NOTE - OBJECTIVE STATEMENT
40y Female complaining of foot pain/injury. Patient is seeking a second opinion as she is previously seen by two podiatrist. Patient reports having hardened areas on the bottom of her feet that are painful to stand on. 40y Female complaining of foot pain/injury. Patient is seeking a second opinion as she is previously seen by two podiatrists, both of whom already recommended her to see a dermatologist for this condition. Patient reports having hardened areas on the bottom of her feet that are painful to stand on x weeks but denies any fever, erythema, tenderness, bleeding, active drainage, purulence, induration, fluctuance, headache, LE swelling, ankle swelling, trauma, or any other symptoms.

## 2021-05-15 NOTE — ED PROVIDER NOTE - NSFOLLOWUPINSTRUCTIONS_ED_ALL_ED_FT
Consider asking a dermtologist for a prescription for Urea cream and/or clobetasol 0.05% cream    Dyshidrotic Eczema    WHAT YOU NEED TO KNOW:    Dyshidrotic eczema is a recurrent skin rash with small fluid-filled blisters. The blisters appear on palms of your hands, on the sides of your fingers, and soles of your feet. The exact cause is unknown. Your risk may be increased if you have allergies, you smoke, or have other skin conditions, such as atopic dermatitis. Your risk may also increase if you eat a diet high in metal salts. Foods such as mushrooms, chocolate and coffee contain metal salts.    DISCHARGE INSTRUCTIONS:    Contact your healthcare provider if:   •The area of your rash is swollen, painful, draining fluid, and feels hot.      •The blisters have yellow crust on them.      •You have questions or concerns about your condition or care.      Medicines:   •Medicines help decrease itching and how long you have a rash. This medicine may be a pill or cream. You may also need medicine to treat an infection.       •Take your medicine as directed. Contact your healthcare provider if you think your medicine is not helping or if you have side effects. Tell him of her if you are allergic to any medicine. Keep a list of the medicines, vitamins, and herbs you take. Include the amounts, and when and why you take them. Bring the list or the pill bottles to follow-up visits. Carry your medicine list with you in case of an emergency.      Care for your rash:   •Do not scratch your rash. Bacteria from your fingernails may enter your open sores during scratching and cause an infection.       •Use moisturizes or emollients, such as petroleum jelly. These help relieve itching and help prevent bacteria from getting in your sores. If you have a doctor's order for medicated cream, apply that first. Then apply the moisturizer or emollient on top.      •Wear cotton socks and gloves. Cotton keeps your rash dry, which helps with itching. Gloves and socks help your medicated cream work better.      •Ask your healthcare provider how often you should wash your hands. You may need to wash them less often while they heal. Do not use hand  with ethyl alcohol.      •Ask your healthcare provider if you need allergy testing. Allergy testing may help identify what triggers your eczema.      Prevent the return of your rash:   •Identify and avoid possible triggers.      •Avoid sweating more than normal.       •Find ways to handle stress.      •Decrease the amount of metal salts in your diet. Avoid onions, tomatoes, beans and beer. Ask your healthcare provider what other foods you should avoid.      •Do not smoke. If you smoke, it is never too late to quit. Ask for information if you need help quitting.      Follow up with your healthcare provider as directed: Write down your questions so you remember to ask them during your visits.       © Copyright [employer] 2021           back to top                          © Copyright [employer] 2021

## 2021-05-15 NOTE — ED PROVIDER NOTE - SKIN AREA #1
arches of both feet L worse than R, callused, dry, no drainage, no erythema, no tenderness, no streaking, no warmth to touch, no tenderness to touch or palpation, no bleeding/plantar

## 2021-05-15 NOTE — ED PROVIDER NOTE - PATIENT PORTAL LINK FT
You can access the FollowMyHealth Patient Portal offered by [facility] by registering at the following website: [email or web address]. By joining [facility]’s FollowMyHealth portal, you will also be able to view your health information using other applications (apps) compatible with our system.

## 2021-05-17 ENCOUNTER — TRANSCRIPTION ENCOUNTER (OUTPATIENT)
Age: 41
End: 2021-05-17

## 2021-05-28 ENCOUNTER — TRANSCRIPTION ENCOUNTER (OUTPATIENT)
Age: 41
End: 2021-05-28

## 2021-11-17 NOTE — ED ADULT TRIAGE NOTE - NS ED TRIAGE HISTORIAN
Patient/EMS Eucrisa Counseling: Patient may experience a mild burning sensation during topical application. Eucrisa is not approved in children less than 2 years of age.

## 2022-02-01 ENCOUNTER — EMERGENCY (EMERGENCY)
Facility: HOSPITAL | Age: 42
LOS: 1 days | Discharge: HOME | End: 2022-02-01
Admitting: EMERGENCY MEDICINE
Payer: MEDICAID

## 2022-02-01 VITALS
SYSTOLIC BLOOD PRESSURE: 137 MMHG | TEMPERATURE: 98 F | HEIGHT: 65 IN | DIASTOLIC BLOOD PRESSURE: 72 MMHG | HEART RATE: 91 BPM | OXYGEN SATURATION: 99 % | RESPIRATION RATE: 18 BRPM

## 2022-02-01 VITALS
RESPIRATION RATE: 18 BRPM | SYSTOLIC BLOOD PRESSURE: 139 MMHG | DIASTOLIC BLOOD PRESSURE: 81 MMHG | HEART RATE: 73 BPM | TEMPERATURE: 98 F | OXYGEN SATURATION: 100 %

## 2022-02-01 DIAGNOSIS — N93.9 ABNORMAL UTERINE AND VAGINAL BLEEDING, UNSPECIFIED: ICD-10-CM

## 2022-02-01 DIAGNOSIS — Z87.42 PERSONAL HISTORY OF OTHER DISEASES OF THE FEMALE GENITAL TRACT: ICD-10-CM

## 2022-02-01 LAB
ALBUMIN SERPL ELPH-MCNC: 3.4 G/DL — SIGNIFICANT CHANGE UP (ref 3.4–5)
ALP SERPL-CCNC: 81 U/L — SIGNIFICANT CHANGE UP (ref 40–120)
ALT FLD-CCNC: 22 U/L — SIGNIFICANT CHANGE UP (ref 12–42)
ANION GAP SERPL CALC-SCNC: 9 MMOL/L — SIGNIFICANT CHANGE UP (ref 9–16)
APPEARANCE UR: SIGNIFICANT CHANGE UP
APTT BLD: 28.3 SEC — SIGNIFICANT CHANGE UP (ref 27.5–35.5)
AST SERPL-CCNC: 16 U/L — SIGNIFICANT CHANGE UP (ref 15–37)
BACTERIA # UR AUTO: PRESENT /HPF
BASOPHILS # BLD AUTO: 0.06 K/UL — SIGNIFICANT CHANGE UP (ref 0–0.2)
BASOPHILS NFR BLD AUTO: 0.6 % — SIGNIFICANT CHANGE UP (ref 0–2)
BILIRUB SERPL-MCNC: 0.1 MG/DL — LOW (ref 0.2–1.2)
BILIRUB UR-MCNC: NEGATIVE — SIGNIFICANT CHANGE UP
BUN SERPL-MCNC: 16 MG/DL — SIGNIFICANT CHANGE UP (ref 7–23)
CALCIUM SERPL-MCNC: 8.5 MG/DL — SIGNIFICANT CHANGE UP (ref 8.5–10.5)
CHLORIDE SERPL-SCNC: 104 MMOL/L — SIGNIFICANT CHANGE UP (ref 96–108)
CO2 SERPL-SCNC: 22 MMOL/L — SIGNIFICANT CHANGE UP (ref 22–31)
COLOR SPEC: ABNORMAL
CREAT SERPL-MCNC: 0.85 MG/DL — SIGNIFICANT CHANGE UP (ref 0.5–1.3)
DIFF PNL FLD: ABNORMAL
EOSINOPHIL # BLD AUTO: 0.29 K/UL — SIGNIFICANT CHANGE UP (ref 0–0.5)
EOSINOPHIL NFR BLD AUTO: 3.1 % — SIGNIFICANT CHANGE UP (ref 0–6)
EPI CELLS # UR: SIGNIFICANT CHANGE UP /HPF (ref 0–5)
GLUCOSE SERPL-MCNC: 98 MG/DL — SIGNIFICANT CHANGE UP (ref 70–99)
GLUCOSE UR QL: NEGATIVE — SIGNIFICANT CHANGE UP
HCG SERPL-ACNC: <1 MIU/ML — SIGNIFICANT CHANGE UP
HCT VFR BLD CALC: 35.3 % — SIGNIFICANT CHANGE UP (ref 34.5–45)
HCT VFR BLD CALC: 37.2 % — SIGNIFICANT CHANGE UP (ref 34.5–45)
HGB BLD-MCNC: 11.7 G/DL — SIGNIFICANT CHANGE UP (ref 11.5–15.5)
HGB BLD-MCNC: 12.2 G/DL — SIGNIFICANT CHANGE UP (ref 11.5–15.5)
IMM GRANULOCYTES NFR BLD AUTO: 0.4 % — SIGNIFICANT CHANGE UP (ref 0–1.5)
INR BLD: 0.88 — SIGNIFICANT CHANGE UP (ref 0.88–1.16)
KETONES UR-MCNC: NEGATIVE — SIGNIFICANT CHANGE UP
LEUKOCYTE ESTERASE UR-ACNC: ABNORMAL
LYMPHOCYTES # BLD AUTO: 4.49 K/UL — HIGH (ref 1–3.3)
LYMPHOCYTES # BLD AUTO: 47.3 % — HIGH (ref 13–44)
MCHC RBC-ENTMCNC: 27.9 PG — SIGNIFICANT CHANGE UP (ref 27–34)
MCHC RBC-ENTMCNC: 28 PG — SIGNIFICANT CHANGE UP (ref 27–34)
MCHC RBC-ENTMCNC: 32.8 GM/DL — SIGNIFICANT CHANGE UP (ref 32–36)
MCHC RBC-ENTMCNC: 33.1 GM/DL — SIGNIFICANT CHANGE UP (ref 32–36)
MCV RBC AUTO: 84.4 FL — SIGNIFICANT CHANGE UP (ref 80–100)
MCV RBC AUTO: 85.1 FL — SIGNIFICANT CHANGE UP (ref 80–100)
MONOCYTES # BLD AUTO: 0.97 K/UL — HIGH (ref 0–0.9)
MONOCYTES NFR BLD AUTO: 10.2 % — SIGNIFICANT CHANGE UP (ref 2–14)
NEUTROPHILS # BLD AUTO: 3.65 K/UL — SIGNIFICANT CHANGE UP (ref 1.8–7.4)
NEUTROPHILS NFR BLD AUTO: 38.4 % — LOW (ref 43–77)
NITRITE UR-MCNC: NEGATIVE — SIGNIFICANT CHANGE UP
NRBC # BLD: 0 /100 WBCS — SIGNIFICANT CHANGE UP (ref 0–0)
NRBC # BLD: 0 /100 WBCS — SIGNIFICANT CHANGE UP (ref 0–0)
PH UR: 5.5 — SIGNIFICANT CHANGE UP (ref 5–8)
PLATELET # BLD AUTO: 317 K/UL — SIGNIFICANT CHANGE UP (ref 150–400)
PLATELET # BLD AUTO: 370 K/UL — SIGNIFICANT CHANGE UP (ref 150–400)
POTASSIUM SERPL-MCNC: 3.9 MMOL/L — SIGNIFICANT CHANGE UP (ref 3.5–5.3)
POTASSIUM SERPL-SCNC: 3.9 MMOL/L — SIGNIFICANT CHANGE UP (ref 3.5–5.3)
PROT SERPL-MCNC: 7.2 G/DL — SIGNIFICANT CHANGE UP (ref 6.4–8.2)
PROT UR-MCNC: 30 MG/DL
PROTHROM AB SERPL-ACNC: 10.4 SEC — LOW (ref 10.6–13.6)
RBC # BLD: 4.18 M/UL — SIGNIFICANT CHANGE UP (ref 3.8–5.2)
RBC # BLD: 4.37 M/UL — SIGNIFICANT CHANGE UP (ref 3.8–5.2)
RBC # FLD: 13.9 % — SIGNIFICANT CHANGE UP (ref 10.3–14.5)
RBC # FLD: 13.9 % — SIGNIFICANT CHANGE UP (ref 10.3–14.5)
RBC CASTS # UR COMP ASSIST: ABNORMAL /HPF
SODIUM SERPL-SCNC: 135 MMOL/L — SIGNIFICANT CHANGE UP (ref 132–145)
SP GR SPEC: 1.02 — SIGNIFICANT CHANGE UP (ref 1–1.03)
TROPONIN I, HIGH SENSITIVITY RESULT: <4 NG/L — SIGNIFICANT CHANGE UP
UROBILINOGEN FLD QL: 0.2 E.U./DL — SIGNIFICANT CHANGE UP
WBC # BLD: 9.5 K/UL — SIGNIFICANT CHANGE UP (ref 3.8–10.5)
WBC # BLD: 9.95 K/UL — SIGNIFICANT CHANGE UP (ref 3.8–10.5)
WBC # FLD AUTO: 9.5 K/UL — SIGNIFICANT CHANGE UP (ref 3.8–10.5)
WBC # FLD AUTO: 9.95 K/UL — SIGNIFICANT CHANGE UP (ref 3.8–10.5)
WBC UR QL: < 5 /HPF — SIGNIFICANT CHANGE UP

## 2022-02-01 PROCEDURE — 93010 ELECTROCARDIOGRAM REPORT: CPT

## 2022-02-01 PROCEDURE — 76830 TRANSVAGINAL US NON-OB: CPT | Mod: 26

## 2022-02-01 PROCEDURE — 76856 US EXAM PELVIC COMPLETE: CPT | Mod: 26,59

## 2022-02-01 PROCEDURE — 99285 EMERGENCY DEPT VISIT HI MDM: CPT | Mod: 25

## 2022-02-01 RX ORDER — OXYCODONE AND ACETAMINOPHEN 5; 325 MG/1; MG/1
1 TABLET ORAL ONCE
Refills: 0 | Status: DISCONTINUED | OUTPATIENT
Start: 2022-02-01 | End: 2022-02-01

## 2022-02-01 RX ORDER — SODIUM CHLORIDE 9 MG/ML
1000 INJECTION INTRAMUSCULAR; INTRAVENOUS; SUBCUTANEOUS ONCE
Refills: 0 | Status: COMPLETED | OUTPATIENT
Start: 2022-02-01 | End: 2022-02-01

## 2022-02-01 RX ORDER — MORPHINE SULFATE 50 MG/1
4 CAPSULE, EXTENDED RELEASE ORAL ONCE
Refills: 0 | Status: DISCONTINUED | OUTPATIENT
Start: 2022-02-01 | End: 2022-02-01

## 2022-02-01 RX ORDER — TRAMADOL HYDROCHLORIDE 50 MG/1
1 TABLET ORAL
Qty: 10 | Refills: 0
Start: 2022-02-01 | End: 2022-02-05

## 2022-02-01 RX ADMIN — OXYCODONE AND ACETAMINOPHEN 1 TABLET(S): 5; 325 TABLET ORAL at 12:47

## 2022-02-01 RX ADMIN — Medication 20 MILLIGRAM(S): at 11:12

## 2022-02-01 RX ADMIN — SODIUM CHLORIDE 1000 MILLILITER(S): 9 INJECTION INTRAMUSCULAR; INTRAVENOUS; SUBCUTANEOUS at 11:13

## 2022-02-01 RX ADMIN — MORPHINE SULFATE 4 MILLIGRAM(S): 50 CAPSULE, EXTENDED RELEASE ORAL at 11:12

## 2022-02-01 NOTE — ED PROVIDER NOTE - CLINICAL SUMMARY MEDICAL DECISION MAKING FREE TEXT BOX
Patient presents complaining of 2 months of vaginal bleeding worsening in pain and severity until last night, prompting ED visit. Patient was previously not passing clots, but now is. Will get labs, EKG, troponin, UA, u-culture, coags, Pelvic US, and give fluids, and morphine and bentyl for pain. Patient presents complaining of 2 months of vaginal bleeding worsening in pain and severity until last night, prompting ED visit. Patient was previously not passing clots, but now is. Will get labs, EKG, troponin, UA, u-culture, coags, Pelvic US, and give fluids, and morphine and bentyl for pain.    pt feeling better, requesting discharge. will send meds. does not want to wait for papers. discussed results showing stable H&H. will f/u with her gyn this week.

## 2022-02-01 NOTE — ED ADULT NURSE NOTE - NSICDXPASTMEDICALHX_GEN_ALL_CORE_FT
PAST MEDICAL HISTORY:  Depression with anxiety     Endometriosis     Fibroids     Gonorrhea     Sciatica

## 2022-02-01 NOTE — ED PROVIDER NOTE - PATIENT PORTAL LINK FT
You can access the FollowMyHealth Patient Portal offered by [facility] by registering at the following website: [email or web address]. By joining [employer]’s FollowMyHealth portal, you will also be able to view your health information using other applications (apps) compatible with our system.

## 2022-02-01 NOTE — ED PROVIDER NOTE - GASTROINTESTINAL [+], MLM
Department of Anesthesiology  Postprocedure Note    Patient: Brie Palacio  MRN: [id number]  [place]: 7/8/1931  Date of evaluation: 7/13/2021  Time:  3:16 PM     Procedure Summary     Date: 07/13/21 Room / Location: [address] [place] [address]    Anesthesia Start: 1111 Anesthesia Stop: 1214    Procedure: CYSTOSCOPY TRANSURETHRAL RESECTION PROSTATE LASER-PVP GREENLIGHT (N/A ) Diagnosis: (BPH)    Surgeons: Jeannette Mobley MD Responsible Provider: ISAURA Sorto CRNA    Anesthesia Type: general ASA Status: 3          Anesthesia Type: general    Lizzie Phase I: Lizzie Score: 10    Lizzie Phase II: Lizzie Score: 10    Last vitals: Reviewed and per EMR flowsheets.        Anesthesia Post Evaluation    Patient location during evaluation: PACU  Patient participation: complete - patient participated  Level of consciousness: awake and alert  Pain score: 1  Airway patency: patent  Nausea & Vomiting: no nausea and no vomiting  Complications: no  Cardiovascular status: hemodynamically stable  Respiratory status: acceptable  Hydration status: euvolemic
ABDOMINAL PAIN/NAUSEA

## 2022-02-01 NOTE — ED PROVIDER NOTE - GENITOURINARY, MLM
Chaperoned by MARINO Del Cid. No cervical motion tenderness, cervix closed, +minimal right adnexal tenderness. No mid-adnexal tenderness. +moderate blood in the vaginal vault. +passed 5 cm clot during exam.

## 2022-02-01 NOTE — ED ADULT NURSE REASSESSMENT NOTE - NS ED NURSE REASSESS COMMENT FT1
break coverage for monica armenta, introduced self to patient, given pain meds informed pt we will repeat cbc in 1 hr , pt states hasn't passed further blood since vaginal exam, gcs 15

## 2022-02-01 NOTE — ED ADULT NURSE NOTE - OBJECTIVE STATEMENT
Patient presented to the ED with cc of vaginal bleeding x 3 months, worsening pain today. Patient reports a PMH of uterine fibroids. Patient states she is soaking more than 1 pad per hour.

## 2022-02-01 NOTE — ED PROVIDER NOTE - OBJECTIVE STATEMENT
40 y/o female with PMHx of fibroids, who takes OCPs, presents to the ED complaining of vaginal bleeding and LLQ abdominal pain. Patient notes having vaginal bleeding for the last 2 months, which suddenly became worse last night. Patient states she noticed increased bleeding last night. She notes that she is always in pain with the bleeding, and this is usually managed with 800 mg Motrin. Patient most recently had 800 mg Motrin at 08:00 this morning with no relief. Patient states she typically was changing her tampon every 2-3 hours, but since this morning has been changing them every 45 minutes. Patient also states she noticed clots in the tampons. She states she is currently sexually active, and sometimes misses 1-2 doses of OCPs. She describes LLQ pain as radiating tot he left flank. Denies dysuria, fever, chills, vomiting, and diarrhea. Patient endorses nausea, which has been present since she was diagnosed with COVID on January 15th. Patient has also had mild chest discomfort and cough since that time as well. Patient endorses dizziness today, which she has not experienced before. Patient is not on blood thinning medication.

## 2022-02-03 LAB
CULTURE RESULTS: SIGNIFICANT CHANGE UP
SPECIMEN SOURCE: SIGNIFICANT CHANGE UP

## 2022-02-23 NOTE — ED PROVIDER NOTE - NS ED ACP SCRIBE NAME FT
Jessica Jessica Curvilinear Excision Additional Text (Leave Blank If You Do Not Want): The margin was drawn around the clinically apparent lesion.  A curvilinear shape was then drawn on the skin incorporating the lesion and margins.  Incisions were then made along these lines to the appropriate tissue plane and the lesion was extirpated.

## 2022-04-07 ENCOUNTER — TRANSCRIPTION ENCOUNTER (OUTPATIENT)
Age: 42
End: 2022-04-07

## 2022-04-07 VITALS
HEART RATE: 90 BPM | HEIGHT: 66 IN | SYSTOLIC BLOOD PRESSURE: 119 MMHG | TEMPERATURE: 97 F | WEIGHT: 186.29 LBS | DIASTOLIC BLOOD PRESSURE: 81 MMHG | OXYGEN SATURATION: 99 % | RESPIRATION RATE: 16 BRPM

## 2022-04-07 RX ORDER — IBUPROFEN 200 MG
1 TABLET ORAL
Qty: 0 | Refills: 0 | DISCHARGE

## 2022-04-07 RX ORDER — QUETIAPINE FUMARATE 200 MG/1
1 TABLET, FILM COATED ORAL
Qty: 0 | Refills: 0 | DISCHARGE

## 2022-04-07 NOTE — ASU PATIENT PROFILE, ADULT - NSICDXPASTSURGICALHX_GEN_ALL_CORE_FT
PAST SURGICAL HISTORY:  H/O fracture of foot s/p surgery, and removal of hardware    H/O umbilical hernia repair      PAST SURGICAL HISTORY:  H/O fracture of foot s/p surgery, and removal of hardware; right    H/O umbilical hernia repair

## 2022-04-07 NOTE — ASU PATIENT PROFILE, ADULT - FALL HARM RISK - UNIVERSAL INTERVENTIONS
Bed in lowest position, wheels locked, appropriate side rails in place/Call bell, personal items and telephone in reach/Instruct patient to call for assistance before getting out of bed or chair/Non-slip footwear when patient is out of bed/[place] to call system/Physically safe environment - no spills, clutter or unnecessary equipment/Purposeful Proactive Rounding/Room/bathroom lighting operational, light cord in reach

## 2022-04-08 ENCOUNTER — RESULT REVIEW (OUTPATIENT)
Age: 42
End: 2022-04-08

## 2022-04-08 ENCOUNTER — OUTPATIENT (OUTPATIENT)
Dept: INPATIENT UNIT | Facility: HOSPITAL | Age: 42
LOS: 1 days | Discharge: HOME | End: 2022-04-08
Payer: COMMERCIAL

## 2022-04-08 ENCOUNTER — TRANSCRIPTION ENCOUNTER (OUTPATIENT)
Age: 42
End: 2022-04-08

## 2022-04-08 VITALS
TEMPERATURE: 98 F | RESPIRATION RATE: 19 BRPM | SYSTOLIC BLOOD PRESSURE: 147 MMHG | HEART RATE: 90 BPM | OXYGEN SATURATION: 97 % | DIASTOLIC BLOOD PRESSURE: 83 MMHG

## 2022-04-08 DIAGNOSIS — Z98.890 OTHER SPECIFIED POSTPROCEDURAL STATES: Chronic | ICD-10-CM

## 2022-04-08 DIAGNOSIS — Z87.81 PERSONAL HISTORY OF (HEALED) TRAUMATIC FRACTURE: Chronic | ICD-10-CM

## 2022-04-08 LAB
BASOPHILS # BLD AUTO: 0.03 K/UL — SIGNIFICANT CHANGE UP (ref 0–0.2)
BASOPHILS NFR BLD AUTO: 0.2 % — SIGNIFICANT CHANGE UP (ref 0–2)
BLD GP AB SCN SERPL QL: NEGATIVE — SIGNIFICANT CHANGE UP
EOSINOPHIL # BLD AUTO: 0.01 K/UL — SIGNIFICANT CHANGE UP (ref 0–0.5)
EOSINOPHIL NFR BLD AUTO: 0.1 % — SIGNIFICANT CHANGE UP (ref 0–6)
GLUCOSE BLDC GLUCOMTR-MCNC: 106 MG/DL — HIGH (ref 70–99)
HCT VFR BLD CALC: 34.8 % — SIGNIFICANT CHANGE UP (ref 34.5–45)
HGB BLD-MCNC: 11.1 G/DL — LOW (ref 11.5–15.5)
IMM GRANULOCYTES NFR BLD AUTO: 0.5 % — SIGNIFICANT CHANGE UP (ref 0–1.5)
LYMPHOCYTES # BLD AUTO: 1.31 K/UL — SIGNIFICANT CHANGE UP (ref 1–3.3)
LYMPHOCYTES # BLD AUTO: 7.8 % — LOW (ref 13–44)
MCHC RBC-ENTMCNC: 26.1 PG — LOW (ref 27–34)
MCHC RBC-ENTMCNC: 31.9 GM/DL — LOW (ref 32–36)
MCV RBC AUTO: 81.9 FL — SIGNIFICANT CHANGE UP (ref 80–100)
MONOCYTES # BLD AUTO: 0.5 K/UL — SIGNIFICANT CHANGE UP (ref 0–0.9)
MONOCYTES NFR BLD AUTO: 3 % — SIGNIFICANT CHANGE UP (ref 2–14)
NEUTROPHILS # BLD AUTO: 14.94 K/UL — HIGH (ref 1.8–7.4)
NEUTROPHILS NFR BLD AUTO: 88.4 % — HIGH (ref 43–77)
NRBC # BLD: 0 /100 WBCS — SIGNIFICANT CHANGE UP (ref 0–0)
PLATELET # BLD AUTO: 328 K/UL — SIGNIFICANT CHANGE UP (ref 150–400)
RBC # BLD: 4.25 M/UL — SIGNIFICANT CHANGE UP (ref 3.8–5.2)
RBC # FLD: 14.6 % — HIGH (ref 10.3–14.5)
RH IG SCN BLD-IMP: POSITIVE — SIGNIFICANT CHANGE UP
WBC # BLD: 16.87 K/UL — HIGH (ref 3.8–10.5)
WBC # FLD AUTO: 16.87 K/UL — HIGH (ref 3.8–10.5)

## 2022-04-08 PROCEDURE — C1889: CPT

## 2022-04-08 PROCEDURE — 86923 COMPATIBILITY TEST ELECTRIC: CPT

## 2022-04-08 PROCEDURE — 82962 GLUCOSE BLOOD TEST: CPT

## 2022-04-08 PROCEDURE — 85025 COMPLETE CBC W/AUTO DIFF WBC: CPT

## 2022-04-08 PROCEDURE — 58573 TLH W/T/O UTERUS OVER 250 G: CPT

## 2022-04-08 PROCEDURE — 86850 RBC ANTIBODY SCREEN: CPT

## 2022-04-08 PROCEDURE — 88307 TISSUE EXAM BY PATHOLOGIST: CPT

## 2022-04-08 PROCEDURE — 86900 BLOOD TYPING SEROLOGIC ABO: CPT

## 2022-04-08 PROCEDURE — S2900: CPT

## 2022-04-08 PROCEDURE — 88307 TISSUE EXAM BY PATHOLOGIST: CPT | Mod: 26

## 2022-04-08 PROCEDURE — 86901 BLOOD TYPING SEROLOGIC RH(D): CPT

## 2022-04-08 DEVICE — SURGICEL POWDER 3 GRAMS: Type: IMPLANTABLE DEVICE | Status: FUNCTIONAL

## 2022-04-08 RX ORDER — CLONAZEPAM 1 MG
1 TABLET ORAL
Qty: 0 | Refills: 0 | DISCHARGE

## 2022-04-08 RX ORDER — SODIUM CHLORIDE 9 MG/ML
1000 INJECTION, SOLUTION INTRAVENOUS
Refills: 0 | Status: DISCONTINUED | OUTPATIENT
Start: 2022-04-08 | End: 2022-04-08

## 2022-04-08 RX ORDER — GABAPENTIN 400 MG/1
300 CAPSULE ORAL ONCE
Refills: 0 | Status: COMPLETED | OUTPATIENT
Start: 2022-04-08 | End: 2022-04-08

## 2022-04-08 RX ORDER — VALACYCLOVIR 500 MG/1
1 TABLET, FILM COATED ORAL
Qty: 0 | Refills: 0 | DISCHARGE

## 2022-04-08 RX ORDER — OXYCODONE HYDROCHLORIDE 5 MG/1
10 TABLET ORAL EVERY 4 HOURS
Refills: 0 | Status: DISCONTINUED | OUTPATIENT
Start: 2022-04-08 | End: 2022-04-08

## 2022-04-08 RX ORDER — CELECOXIB 200 MG/1
400 CAPSULE ORAL ONCE
Refills: 0 | Status: COMPLETED | OUTPATIENT
Start: 2022-04-08 | End: 2022-04-08

## 2022-04-08 RX ORDER — GABAPENTIN 400 MG/1
1 CAPSULE ORAL
Qty: 0 | Refills: 0 | DISCHARGE

## 2022-04-08 RX ORDER — IBUPROFEN 200 MG
1 TABLET ORAL
Qty: 40 | Refills: 0
Start: 2022-04-08 | End: 2022-04-17

## 2022-04-08 RX ORDER — OXYCODONE HYDROCHLORIDE 5 MG/1
1 TABLET ORAL
Qty: 12 | Refills: 0
Start: 2022-04-08 | End: 2022-04-10

## 2022-04-08 RX ORDER — OXYCODONE HYDROCHLORIDE 5 MG/1
5 TABLET ORAL EVERY 4 HOURS
Refills: 0 | Status: DISCONTINUED | OUTPATIENT
Start: 2022-04-08 | End: 2022-04-08

## 2022-04-08 RX ORDER — ACETAMINOPHEN 500 MG
1000 TABLET ORAL EVERY 6 HOURS
Refills: 0 | Status: DISCONTINUED | OUTPATIENT
Start: 2022-04-08 | End: 2022-04-08

## 2022-04-08 RX ORDER — HEPARIN SODIUM 5000 [USP'U]/ML
5000 INJECTION INTRAVENOUS; SUBCUTANEOUS ONCE
Refills: 0 | Status: COMPLETED | OUTPATIENT
Start: 2022-04-08 | End: 2022-04-08

## 2022-04-08 RX ORDER — HYDROMORPHONE HYDROCHLORIDE 2 MG/ML
0.5 INJECTION INTRAMUSCULAR; INTRAVENOUS; SUBCUTANEOUS
Refills: 0 | Status: DISCONTINUED | OUTPATIENT
Start: 2022-04-08 | End: 2022-04-08

## 2022-04-08 RX ORDER — ONDANSETRON 8 MG/1
8 TABLET, FILM COATED ORAL EVERY 8 HOURS
Refills: 0 | Status: DISCONTINUED | OUTPATIENT
Start: 2022-04-08 | End: 2022-04-08

## 2022-04-08 RX ORDER — QUETIAPINE FUMARATE 200 MG/1
1 TABLET, FILM COATED ORAL
Qty: 0 | Refills: 0 | DISCHARGE

## 2022-04-08 RX ORDER — ACETAMINOPHEN 500 MG
1000 TABLET ORAL ONCE
Refills: 0 | Status: COMPLETED | OUTPATIENT
Start: 2022-04-08 | End: 2022-04-08

## 2022-04-08 RX ORDER — SIMETHICONE 80 MG/1
80 TABLET, CHEWABLE ORAL EVERY 6 HOURS
Refills: 0 | Status: DISCONTINUED | OUTPATIENT
Start: 2022-04-08 | End: 2022-04-08

## 2022-04-08 RX ORDER — HYDROXYZINE HCL 10 MG
1 TABLET ORAL
Qty: 0 | Refills: 0 | DISCHARGE

## 2022-04-08 RX ORDER — METOCLOPRAMIDE HCL 10 MG
10 TABLET ORAL EVERY 8 HOURS
Refills: 0 | Status: DISCONTINUED | OUTPATIENT
Start: 2022-04-08 | End: 2022-04-08

## 2022-04-08 RX ORDER — ACETAMINOPHEN 500 MG
2 TABLET ORAL
Qty: 80 | Refills: 0
Start: 2022-04-08 | End: 2022-04-17

## 2022-04-08 RX ORDER — ZOLPIDEM TARTRATE 10 MG/1
1 TABLET ORAL
Qty: 0 | Refills: 0 | DISCHARGE

## 2022-04-08 RX ADMIN — SIMETHICONE 80 MILLIGRAM(S): 80 TABLET, CHEWABLE ORAL at 13:34

## 2022-04-08 RX ADMIN — GABAPENTIN 300 MILLIGRAM(S): 400 CAPSULE ORAL at 07:19

## 2022-04-08 RX ADMIN — HYDROMORPHONE HYDROCHLORIDE 0.5 MILLIGRAM(S): 2 INJECTION INTRAMUSCULAR; INTRAVENOUS; SUBCUTANEOUS at 11:59

## 2022-04-08 RX ADMIN — CELECOXIB 400 MILLIGRAM(S): 200 CAPSULE ORAL at 07:20

## 2022-04-08 RX ADMIN — HYDROMORPHONE HYDROCHLORIDE 0.5 MILLIGRAM(S): 2 INJECTION INTRAMUSCULAR; INTRAVENOUS; SUBCUTANEOUS at 12:30

## 2022-04-08 RX ADMIN — Medication 1000 MILLIGRAM(S): at 13:33

## 2022-04-08 RX ADMIN — HYDROMORPHONE HYDROCHLORIDE 0.5 MILLIGRAM(S): 2 INJECTION INTRAMUSCULAR; INTRAVENOUS; SUBCUTANEOUS at 16:00

## 2022-04-08 RX ADMIN — HEPARIN SODIUM 5000 UNIT(S): 5000 INJECTION INTRAVENOUS; SUBCUTANEOUS at 07:20

## 2022-04-08 RX ADMIN — HYDROMORPHONE HYDROCHLORIDE 0.5 MILLIGRAM(S): 2 INJECTION INTRAMUSCULAR; INTRAVENOUS; SUBCUTANEOUS at 16:20

## 2022-04-08 RX ADMIN — Medication 1000 MILLIGRAM(S): at 07:20

## 2022-04-08 RX ADMIN — SODIUM CHLORIDE 125 MILLILITER(S): 9 INJECTION, SOLUTION INTRAVENOUS at 11:59

## 2022-04-08 NOTE — PROGRESS NOTE ADULT - SUBJECTIVE AND OBJECTIVE BOX
GYN Progress Note    Patient seen at bedside.  Pain controlled so far, although reports soreness diffusely.  Tolerating clears.  Not OOB yet.  Has not tried to void  No flatus yet    Denies CP, palpitations, SOB, fever, chills, nausea, vomiting.    Vital Signs Last 24 Hrs  T(C): 37.5 (08 Apr 2022 11:45), Max: 37.5 (08 Apr 2022 11:45)  T(F): 99.5 (08 Apr 2022 11:45), Max: 99.5 (08 Apr 2022 11:45)  HR: 87 (08 Apr 2022 13:54) (82 - 89)  BP: 150/81 (08 Apr 2022 13:54) (150/81 - 175/86)  BP(mean): 109 (08 Apr 2022 13:54) (109 - 124)  RR: 15 (08 Apr 2022 13:54) (15 - 21)  SpO2: 100% (08 Apr 2022 13:54) (98% - 100%)    Physical Exam:  Gen: No Acute Distress  GI: soft, appropriately tender, mildly distended, hypoactive BS, no rebound, no guarding.  Incision C/D/I  Ext: SCDs in place, wnl    I&O's Summary    08 Apr 2022 07:01  -  08 Apr 2022 15:34  --------------------------------------------------------  IN: 2450 mL / OUT: 1500 mL / NET: 950 mL      MEDICATIONS  (STANDING):  acetaminophen     Tablet .. 1000 milliGRAM(s) Oral every 6 hours  lactated ringers. 1000 milliLiter(s) (125 mL/Hr) IV Continuous <Continuous>  simethicone 80 milliGRAM(s) Chew every 6 hours    MEDICATIONS  (PRN):  HYDROmorphone  Injectable 0.5 milliGRAM(s) IV Push every 15 minutes PRN Severe Pain (7 - 10)  metoclopramide Injectable 10 milliGRAM(s) IV Push every 8 hours PRN Nausea and/or Vomiting  ondansetron Injectable 8 milliGRAM(s) IV Push every 8 hours PRN Nausea and/or Vomiting  oxyCODONE    IR 5 milliGRAM(s) Oral every 4 hours PRN Moderate Pain (4 - 6)  oxyCODONE    IR 10 milliGRAM(s) Oral every 4 hours PRN Severe Pain (7 - 10)      LABS:                        11.1   16.87 )-----------( 328      ( 08 Apr 2022 13:17 )             34.8

## 2022-04-08 NOTE — BRIEF OPERATIVE NOTE - OPERATION/FINDINGS
RA TLH, BS performed. 15w size fibroid uterus noted on BME. Intra-abdominal survey revealed large fibroid uterus, multiple intramural fibroids, 1 anterior pedunculated fibroid about 3-4cm in size. Normal tubes and ovaries. RA TLH, BS performed. 15w size fibroid uterus noted on BME. Intra-abdominal survey revealed large fibroid uterus, multiple intramural fibroids, 1 anterior pedunculated fibroid about 3-4cm in size. Normal tubes and ovaries. Hysterectomy and b/l salpingectomy performed. Patient had a brief episode of systolic BP drop of 20mmhg after amputation of the uterus/cervix likely 2/2 loss of insufflation. BP returned to baseline spontaneously. Specimens were removed via mini laparotomy 3-4cm in size. Surgicel powder used for additional hemostasis. Fascia closed with 0 vicryl, skin and port sites closed with 4-0 monocryl.

## 2022-04-08 NOTE — ASU DISCHARGE PLAN (ADULT/PEDIATRIC) - CARE PROVIDER_API CALL
Nichole Umana (MD)  Obstetrics and Gynecology  [address], [place] [address]  Phone: [phone]  Fax: [phone]  Follow Up Time:

## 2022-04-08 NOTE — H&P ADULT - ASSESSMENT
42yo P2 presents for scheduled RA TLH, BS, for fibroids c/b AUB, pelvic pain.  - consent signed  - will proceed with scheduled surgery  - d/w Dr. Umana

## 2022-04-08 NOTE — ASU DISCHARGE PLAN (ADULT/PEDIATRIC) - ASU DC SPECIAL INSTRUCTIONSFT
- Nothing in vagina - no intercourse, tampons, or douching until cleared by your doctor.   - Avoid swimming, tub baths, strenuous activity and heavy lifting until cleared by your doctor.   - Showering is ok.   - Continue oral pain medications as needed for pain.    - Percocet, tylenol, and ibuprofen have been sent to [facility], please  before you leave.  - Follow up in office in 2 weeks for your postoperative visit.  Call the office to confirm your follow up appointment.   - Call the office sooner if you develop any fever, heavy bleeding, or severe pain.  Go to the closest emergency room for any of these symptoms if you are not able to contact your doctor. - Nothing in vagina - no intercourse, tampons, or douching until cleared by your doctor.   - Avoid swimming, tub baths, strenuous activity and heavy lifting until cleared by your doctor.   - Showering is ok.   - Continue oral pain medications as needed for pain.    - Oxycodone, tylenol, and ibuprofen have been sent to [facility] the [facility] floor of [facility], please  before you leave.  - Follow up in office in 2 weeks for your postoperative visit.  Call the office to confirm your follow up appointment.   - Call the office sooner if you develop any fever, heavy bleeding, or severe pain.  Go to the closest emergency room for any of these symptoms if you are not able to contact your doctor.

## 2022-04-08 NOTE — PACU DISCHARGE NOTE - COMMENTS
Discharge Teaching done with pt using teachback method. copies given, Pt ambulating steadily, tolerating po, right and left IV removed intact without any signs of inflammation. lap sites dry and intact. accompanied home by sister.

## 2022-04-08 NOTE — ASU DISCHARGE PLAN (ADULT/PEDIATRIC) - ACTIVITY LEVEL
No heavy lifting/Nothing per rectum/Nothing per vagina/No tub baths/No douching/No tampons/No intercourse
Principal Discharge DX:	Hyponatremia  Secondary Diagnosis:	Vomiting

## 2022-04-08 NOTE — ASU DISCHARGE PLAN (ADULT/PEDIATRIC) - NS MD DC FALL RISK RISK
For information on Fall & Injury Prevention, visit: [email or web address] OR  [email or web address] OR  https://www.cdc.gov/steadi/patient.html

## 2022-04-08 NOTE — BRIEF OPERATIVE NOTE - NSICDXBRIEFPROCEDURE_GEN_ALL_CORE_FT
PROCEDURES:  Hysterectomy, total, robot-assisted, for uterus greater than 250 grams, laparoscopic 08-Apr-2022 11:37:17  Aime Crouch  Robot-assisted bilateral salpingectomy 08-Apr-2022 11:37:34  Aime Crouch

## 2022-04-08 NOTE — H&P ADULT - HISTORY OF PRESENT ILLNESS
40yo P2 presents for scheduled RA TLH, BS, for fibroids c/b AUB, pelvic pain.    obhx: 2x   Gynhx: fibroids  PMH: anxiety  PSH: hernia repair 10+ years ago (mesh utilization not known to patient)  meds: gabapentin 600 prn, Seroquel 500 qhs  all: nkda

## 2022-04-08 NOTE — PROGRESS NOTE ADULT - ASSESSMENT
42yo P2 s/p scheduled RA TLH, BS, for fibroid uterus, EBL 300ml. Pacu cbc with reassuring hgb of 11.1.  Neuro: tylenol/toradol atc, oxy for breakthrough pain  CV: VSS  Resp: ORA  GI: tolerating clears  : undergoing TOV  Heme: hgb stable  DVT prophylaxis: SCD's, ambulation

## 2022-04-10 ENCOUNTER — INPATIENT (INPATIENT)
Facility: HOSPITAL | Age: 42
LOS: 1 days | Discharge: HOME | DRG: 389 | End: 2022-04-12
Attending: OBSTETRICS & GYNECOLOGY | Admitting: SURGERY
Payer: COMMERCIAL

## 2022-04-10 VITALS
RESPIRATION RATE: 18 BRPM | DIASTOLIC BLOOD PRESSURE: 84 MMHG | OXYGEN SATURATION: 97 % | HEIGHT: 66 IN | HEART RATE: 115 BPM | TEMPERATURE: 98 F | SYSTOLIC BLOOD PRESSURE: 127 MMHG

## 2022-04-10 DIAGNOSIS — Z98.890 OTHER SPECIFIED POSTPROCEDURAL STATES: Chronic | ICD-10-CM

## 2022-04-10 DIAGNOSIS — Z87.81 PERSONAL HISTORY OF (HEALED) TRAUMATIC FRACTURE: Chronic | ICD-10-CM

## 2022-04-10 DIAGNOSIS — Z90.710 ACQUIRED ABSENCE OF BOTH CERVIX AND UTERUS: Chronic | ICD-10-CM

## 2022-04-10 LAB
ALBUMIN SERPL ELPH-MCNC: 2.7 G/DL — LOW (ref 3.4–5)
ALP SERPL-CCNC: 73 U/L — SIGNIFICANT CHANGE UP (ref 40–120)
ALT FLD-CCNC: 17 U/L — SIGNIFICANT CHANGE UP (ref 12–42)
ANION GAP SERPL CALC-SCNC: 9 MMOL/L — SIGNIFICANT CHANGE UP (ref 9–16)
APPEARANCE UR: CLEAR — SIGNIFICANT CHANGE UP
APTT BLD: 28.9 SEC — SIGNIFICANT CHANGE UP (ref 27.5–35.5)
AST SERPL-CCNC: 16 U/L — SIGNIFICANT CHANGE UP (ref 15–37)
BACTERIA # UR AUTO: PRESENT /HPF
BASOPHILS # BLD AUTO: 0.05 K/UL — SIGNIFICANT CHANGE UP (ref 0–0.2)
BASOPHILS NFR BLD AUTO: 0.4 % — SIGNIFICANT CHANGE UP (ref 0–2)
BILIRUB SERPL-MCNC: 0.2 MG/DL — SIGNIFICANT CHANGE UP (ref 0.2–1.2)
BILIRUB UR-MCNC: NEGATIVE — SIGNIFICANT CHANGE UP
BUN SERPL-MCNC: 9 MG/DL — SIGNIFICANT CHANGE UP (ref 7–23)
CALCIUM SERPL-MCNC: 8.6 MG/DL — SIGNIFICANT CHANGE UP (ref 8.5–10.5)
CHLORIDE SERPL-SCNC: 106 MMOL/L — SIGNIFICANT CHANGE UP (ref 96–108)
CO2 SERPL-SCNC: 25 MMOL/L — SIGNIFICANT CHANGE UP (ref 22–31)
COLOR SPEC: YELLOW — SIGNIFICANT CHANGE UP
COMMENT - URINE: SIGNIFICANT CHANGE UP
CREAT SERPL-MCNC: 0.76 MG/DL — SIGNIFICANT CHANGE UP (ref 0.5–1.3)
DIFF PNL FLD: ABNORMAL
EGFR: 102 ML/MIN/1.73M2 — SIGNIFICANT CHANGE UP
EOSINOPHIL # BLD AUTO: 0.22 K/UL — SIGNIFICANT CHANGE UP (ref 0–0.5)
EOSINOPHIL NFR BLD AUTO: 1.7 % — SIGNIFICANT CHANGE UP (ref 0–6)
EPI CELLS # UR: ABNORMAL /HPF (ref 0–5)
GLUCOSE SERPL-MCNC: 109 MG/DL — HIGH (ref 70–99)
GLUCOSE UR QL: NEGATIVE — SIGNIFICANT CHANGE UP
GRAN CASTS # UR COMP ASSIST: ABNORMAL /LPF
HCT VFR BLD CALC: 38.6 % — SIGNIFICANT CHANGE UP (ref 34.5–45)
HGB BLD-MCNC: 12.3 G/DL — SIGNIFICANT CHANGE UP (ref 11.5–15.5)
IMM GRANULOCYTES NFR BLD AUTO: 0.3 % — SIGNIFICANT CHANGE UP (ref 0–1.5)
INR BLD: 0.86 — LOW (ref 0.88–1.16)
KETONES UR-MCNC: NEGATIVE — SIGNIFICANT CHANGE UP
LEUKOCYTE ESTERASE UR-ACNC: NEGATIVE — SIGNIFICANT CHANGE UP
LYMPHOCYTES # BLD AUTO: 2.83 K/UL — SIGNIFICANT CHANGE UP (ref 1–3.3)
LYMPHOCYTES # BLD AUTO: 22.3 % — SIGNIFICANT CHANGE UP (ref 13–44)
MCHC RBC-ENTMCNC: 26.3 PG — LOW (ref 27–34)
MCHC RBC-ENTMCNC: 31.9 GM/DL — LOW (ref 32–36)
MCV RBC AUTO: 82.5 FL — SIGNIFICANT CHANGE UP (ref 80–100)
MONOCYTES # BLD AUTO: 1.31 K/UL — HIGH (ref 0–0.9)
MONOCYTES NFR BLD AUTO: 10.3 % — SIGNIFICANT CHANGE UP (ref 2–14)
NEUTROPHILS # BLD AUTO: 8.25 K/UL — HIGH (ref 1.8–7.4)
NEUTROPHILS NFR BLD AUTO: 65 % — SIGNIFICANT CHANGE UP (ref 43–77)
NITRITE UR-MCNC: NEGATIVE — SIGNIFICANT CHANGE UP
NRBC # BLD: 0 /100 WBCS — SIGNIFICANT CHANGE UP (ref 0–0)
PH UR: 6 — SIGNIFICANT CHANGE UP (ref 5–8)
PLATELET # BLD AUTO: 338 K/UL — SIGNIFICANT CHANGE UP (ref 150–400)
POTASSIUM SERPL-MCNC: 4 MMOL/L — SIGNIFICANT CHANGE UP (ref 3.5–5.3)
POTASSIUM SERPL-SCNC: 4 MMOL/L — SIGNIFICANT CHANGE UP (ref 3.5–5.3)
PROT SERPL-MCNC: 6.5 G/DL — SIGNIFICANT CHANGE UP (ref 6.4–8.2)
PROT UR-MCNC: NEGATIVE MG/DL — SIGNIFICANT CHANGE UP
PROTHROM AB SERPL-ACNC: 10.1 SEC — LOW (ref 10.5–13.4)
RBC # BLD: 4.68 M/UL — SIGNIFICANT CHANGE UP (ref 3.8–5.2)
RBC # FLD: 14.9 % — HIGH (ref 10.3–14.5)
RBC CASTS # UR COMP ASSIST: < 5 /HPF — SIGNIFICANT CHANGE UP
SARS-COV-2 RNA SPEC QL NAA+PROBE: SIGNIFICANT CHANGE UP
SODIUM SERPL-SCNC: 140 MMOL/L — SIGNIFICANT CHANGE UP (ref 132–145)
SP GR SPEC: 1.01 — SIGNIFICANT CHANGE UP (ref 1–1.03)
UROBILINOGEN FLD QL: 0.2 E.U./DL — SIGNIFICANT CHANGE UP
WBC # BLD: 12.7 K/UL — HIGH (ref 3.8–10.5)
WBC # FLD AUTO: 12.7 K/UL — HIGH (ref 3.8–10.5)
WBC UR QL: < 5 /HPF — SIGNIFICANT CHANGE UP

## 2022-04-10 PROCEDURE — 99284 EMERGENCY DEPT VISIT MOD MDM: CPT

## 2022-04-10 PROCEDURE — 74177 CT ABD & PELVIS W/CONTRAST: CPT | Mod: 26

## 2022-04-10 RX ORDER — QUETIAPINE FUMARATE 200 MG/1
300 TABLET, FILM COATED ORAL AT BEDTIME
Refills: 0 | Status: DISCONTINUED | OUTPATIENT
Start: 2022-04-10 | End: 2022-04-12

## 2022-04-10 RX ORDER — MORPHINE SULFATE 50 MG/1
2 CAPSULE, EXTENDED RELEASE ORAL ONCE
Refills: 0 | Status: DISCONTINUED | OUTPATIENT
Start: 2022-04-10 | End: 2022-04-10

## 2022-04-10 RX ORDER — ACETAMINOPHEN 500 MG
650 TABLET ORAL EVERY 6 HOURS
Refills: 0 | Status: DISCONTINUED | OUTPATIENT
Start: 2022-04-10 | End: 2022-04-10

## 2022-04-10 RX ORDER — GABAPENTIN 400 MG/1
600 CAPSULE ORAL EVERY 24 HOURS
Refills: 0 | Status: DISCONTINUED | OUTPATIENT
Start: 2022-04-10 | End: 2022-04-12

## 2022-04-10 RX ORDER — SIMETHICONE 80 MG/1
80 TABLET, CHEWABLE ORAL EVERY 6 HOURS
Refills: 0 | Status: DISCONTINUED | OUTPATIENT
Start: 2022-04-10 | End: 2022-04-12

## 2022-04-10 RX ORDER — HYDROMORPHONE HYDROCHLORIDE 2 MG/ML
0.5 INJECTION INTRAMUSCULAR; INTRAVENOUS; SUBCUTANEOUS ONCE
Refills: 0 | Status: DISCONTINUED | OUTPATIENT
Start: 2022-04-10 | End: 2022-04-10

## 2022-04-10 RX ORDER — ONDANSETRON 8 MG/1
4 TABLET, FILM COATED ORAL ONCE
Refills: 0 | Status: COMPLETED | OUTPATIENT
Start: 2022-04-10 | End: 2022-04-10

## 2022-04-10 RX ORDER — QUETIAPINE FUMARATE 200 MG/1
200 TABLET, FILM COATED ORAL AT BEDTIME
Refills: 0 | Status: DISCONTINUED | OUTPATIENT
Start: 2022-04-10 | End: 2022-04-12

## 2022-04-10 RX ORDER — SODIUM CHLORIDE 9 MG/ML
1000 INJECTION INTRAMUSCULAR; INTRAVENOUS; SUBCUTANEOUS ONCE
Refills: 0 | Status: COMPLETED | OUTPATIENT
Start: 2022-04-10 | End: 2022-04-10

## 2022-04-10 RX ORDER — KETOROLAC TROMETHAMINE 30 MG/ML
30 SYRINGE (ML) INJECTION ONCE
Refills: 0 | Status: DISCONTINUED | OUTPATIENT
Start: 2022-04-10 | End: 2022-04-10

## 2022-04-10 RX ORDER — MORPHINE SULFATE 50 MG/1
4 CAPSULE, EXTENDED RELEASE ORAL ONCE
Refills: 0 | Status: DISCONTINUED | OUTPATIENT
Start: 2022-04-10 | End: 2022-04-10

## 2022-04-10 RX ORDER — ACETAMINOPHEN 500 MG
1000 TABLET ORAL ONCE
Refills: 0 | Status: COMPLETED | OUTPATIENT
Start: 2022-04-10 | End: 2022-04-12

## 2022-04-10 RX ORDER — ACETAMINOPHEN 500 MG
975 TABLET ORAL EVERY 6 HOURS
Refills: 0 | Status: DISCONTINUED | OUTPATIENT
Start: 2022-04-10 | End: 2022-04-12

## 2022-04-10 RX ORDER — ZOLPIDEM TARTRATE 10 MG/1
5 TABLET ORAL AT BEDTIME
Refills: 0 | Status: DISCONTINUED | OUTPATIENT
Start: 2022-04-10 | End: 2022-04-12

## 2022-04-10 RX ORDER — IBUPROFEN 200 MG
600 TABLET ORAL EVERY 6 HOURS
Refills: 0 | Status: DISCONTINUED | OUTPATIENT
Start: 2022-04-10 | End: 2022-04-11

## 2022-04-10 RX ADMIN — MORPHINE SULFATE 2 MILLIGRAM(S): 50 CAPSULE, EXTENDED RELEASE ORAL at 18:56

## 2022-04-10 RX ADMIN — MORPHINE SULFATE 2 MILLIGRAM(S): 50 CAPSULE, EXTENDED RELEASE ORAL at 19:54

## 2022-04-10 RX ADMIN — ONDANSETRON 4 MILLIGRAM(S): 8 TABLET, FILM COATED ORAL at 04:08

## 2022-04-10 RX ADMIN — MORPHINE SULFATE 2 MILLIGRAM(S): 50 CAPSULE, EXTENDED RELEASE ORAL at 10:23

## 2022-04-10 RX ADMIN — HYDROMORPHONE HYDROCHLORIDE 0.5 MILLIGRAM(S): 2 INJECTION INTRAMUSCULAR; INTRAVENOUS; SUBCUTANEOUS at 19:06

## 2022-04-10 RX ADMIN — Medication 650 MILLIGRAM(S): at 23:13

## 2022-04-10 RX ADMIN — MORPHINE SULFATE 2 MILLIGRAM(S): 50 CAPSULE, EXTENDED RELEASE ORAL at 19:58

## 2022-04-10 RX ADMIN — MORPHINE SULFATE 2 MILLIGRAM(S): 50 CAPSULE, EXTENDED RELEASE ORAL at 10:29

## 2022-04-10 RX ADMIN — MORPHINE SULFATE 4 MILLIGRAM(S): 50 CAPSULE, EXTENDED RELEASE ORAL at 04:08

## 2022-04-10 RX ADMIN — Medication 30 MILLIGRAM(S): at 22:55

## 2022-04-10 RX ADMIN — SIMETHICONE 80 MILLIGRAM(S): 80 TABLET, CHEWABLE ORAL at 22:30

## 2022-04-10 RX ADMIN — MORPHINE SULFATE 2 MILLIGRAM(S): 50 CAPSULE, EXTENDED RELEASE ORAL at 17:15

## 2022-04-10 RX ADMIN — MORPHINE SULFATE 4 MILLIGRAM(S): 50 CAPSULE, EXTENDED RELEASE ORAL at 03:30

## 2022-04-10 RX ADMIN — MORPHINE SULFATE 2 MILLIGRAM(S): 50 CAPSULE, EXTENDED RELEASE ORAL at 08:36

## 2022-04-10 RX ADMIN — ZOLPIDEM TARTRATE 5 MILLIGRAM(S): 10 TABLET ORAL at 22:30

## 2022-04-10 RX ADMIN — Medication 650 MILLIGRAM(S): at 22:30

## 2022-04-10 RX ADMIN — SODIUM CHLORIDE 1000 MILLILITER(S): 9 INJECTION INTRAMUSCULAR; INTRAVENOUS; SUBCUTANEOUS at 02:50

## 2022-04-10 RX ADMIN — MORPHINE SULFATE 2 MILLIGRAM(S): 50 CAPSULE, EXTENDED RELEASE ORAL at 17:52

## 2022-04-10 RX ADMIN — MORPHINE SULFATE 4 MILLIGRAM(S): 50 CAPSULE, EXTENDED RELEASE ORAL at 02:51

## 2022-04-10 RX ADMIN — MORPHINE SULFATE 2 MILLIGRAM(S): 50 CAPSULE, EXTENDED RELEASE ORAL at 13:57

## 2022-04-10 RX ADMIN — MORPHINE SULFATE 4 MILLIGRAM(S): 50 CAPSULE, EXTENDED RELEASE ORAL at 05:30

## 2022-04-10 NOTE — ED BEHAVIORAL HEALTH NOTE - BEHAVIORAL HEALTH NOTE
PT is a 41 year old female with a history of umbilical hernia repair 10 years ago and hysterectomy for fibroids on 4/18/22 presented to the ED with c/o abd pain. SW was consulted to the ED by Provider to complete a psychosocial assessment with PT for hospital admissions.  PT declined to have assessment, due to severity of illness.  Team made aware and SW made available for further assistance.

## 2022-04-10 NOTE — ED BEHAVIORAL HEALTH NOTE - BEHAVIORAL HEALTH NOTE
SW was consulted to the ED by Provider to assist PT with a [facility] referral.  A [facility] referral was made on PT behalf by MOLLY.  Team made aware and SW made available for further assistance.

## 2022-04-10 NOTE — ED ADULT NURSE NOTE - COVID-19  TEST TYPE
[FreeTextEntry1] : CPE [de-identified] : sinus pressure, HAs\par \par Going to gym once a week.  States weight-lifting is difficult because of rotate cuff issues\par \par Ear infection in [place], took oral antibiotics there. MOLECULAR PCR

## 2022-04-10 NOTE — ED PROVIDER NOTE - GASTROINTESTINAL, MLM
Abdomen soft, distended, diffusely tender, laparoscopic surgical incisions with small ecchymosis to periumbilical region, no dehiscence or erythema.

## 2022-04-10 NOTE — H&P ADULT - HISTORY OF PRESENT ILLNESS
42yo P2 POD2 s/p scheduled RA FELIXH BS, for fibroids (15wk size uterus w/ 1x anterior pedunculated fibroid about 3-4cm in size) c/b AUB and pelvic pain, presents today to [facility] ED with severe abdominal pain after finishing all her oxycodone pills. Pt denies fever, chills, chest pain, SOB, abdominal pain, nausea, vomiting, vaginal bleeding    OBhx: 2x   Gynhx: fibroids  PMH: anxiety  PSH: hernia repair 10+ years ago (mesh utilization not known to patient)  Meds: gabapentin 600 prn, Seroquel 500 qhs  All: nkda    PHYSICAL EXAM:   Vital Signs Last 24 Hrs  T(C): 36.4 (10 Apr 2022 21:00), Max: 36.9 (10 Apr 2022 01:42)  T(F): 97.5 (10 Apr 2022 21:00), Max: 98.4 (10 Apr 2022 01:42)  HR: 90 (10 Apr 2022 21:00) (90 - 115)  BP: 128/83 (10 Apr 2022 21:00) (105/69 - 144/88)  RR: 17 (10 Apr 2022 21:00) (16 - 19)  SpO2: 97% (10 Apr 2022 21:00) (95% - 97%)    PE pending      LABS:                        12.3   12.70 )-----------( 338      ( 10 Apr 2022 03:15 )             38.6     04-10    140  |  106  |  9   ----------------------------<  109<H>  4.0   |  25  |  0.76    Ca    8.6      10 Apr 2022 03:15    TPro  6.5  /  Alb  2.7<L>  /  TBili  0.2  /  DBili  x   /  AST  16  /  ALT  17  /  AlkPhos  73  04-10    PT/INR - ( 10 Apr 2022 03:15 )   PT: 10.1 sec;   INR: 0.86          PTT - ( 10 Apr 2022 03:15 )  PTT:28.9 sec  Urinalysis Basic - ( 10 Apr 2022 06:47 )    Color: Yellow / Appearance: Clear / S.010 / pH: x  Gluc: x / Ketone: NEGATIVE  / Bili: NEGATIVE / Urobili: 0.2 E.U./dL   Blood: x / Protein: NEGATIVE mg/dL / Nitrite: NEGATIVE   Leuk Esterase: NEGATIVE / RBC: < 5 /HPF / WBC < 5 /HPF   Sq Epi: x / Non Sq Epi: 5-10 /HPF / Bacteria: Present /HPF          RADIOLOGY & ADDITIONAL STUDIES:    ACC: [id number] EXAM:  CT ABDOMEN AND PELVIS IC                          PROCEDURE DATE:  04/10/2022          INTERPRETATION:  CLINICAL INFORMATION: Abdominal pain after hysterectomy   2 days ago    COMPARISON: Pelvic ultrasound dated 2022 and CT of the abdomen and   pelvis dated 3/26/2015.    CONTRAST/COMPLICATIONS:  IV Contrast: Omnipaque 350  99 cc administered   1 cc discarded  Oral Contrast: NONE  Complications: None reported at time of study completion    PROCEDURE:  CT of the Abdomen and Pelvis was performed.  Sagittal and coronal reformats were performed.    FINDINGS:  LOWER CHEST: Bibasilar linear atelectasis. Small right and trace left   pleural effusions.    LIVER: Within normal limits.  BILE DUCTS: Normal caliber.  GALLBLADDER: Within normal limits.  SPLEEN: Within normal limits.  PANCREAS: Within normal limits.  ADRENALS: Within normal limits.  KIDNEYS/URETERS: Within normal limits.    BLADDER: Small air in the bladder which is due to recent instrumentation.  REPRODUCTIVE ORGANS: Status post hysterectomy. No adnexal mass.    BOWEL: Limited evaluation without enteric contrast. Multiple dilated   loops of small bowel in the upper abdomen measuring 4.0 cm. There may be   a transition point in the left mid abdomen (2:60) with more normal return   of caliber of the distal bowel. No significant mural thickening is   identified. Fluid and gas is noted to the region of the rectum.  PERITONEUM: Moderate ascites some demonstrating simple and other pockets   complex attenuation with trace pneumoperitoneum, likely postoperative.  VESSELS: Within normal limits.  RETROPERITONEUM/LYMPH NODES: No lymphadenopathy.  ABDOMINAL WALL: Postoperative changes in the anterior abdominal wall.  BONES: Within normal limits.    IMPRESSION:  1.  Limited evaluation without enteric contrast. Findings compatible with   an incomplete small bowel obstruction with multiple dilated loops of the   proximal small bowel in the left upper quadrant with a transition point   thought to be in the left mid abdomen. No obstructing mass.   Intra-abdominal adhesions cannot be excluded.  2.  Mildly dilated fluid-filled loops of ascending and transverse colon   with gas noted to the rectum.  3.  Moderate abdominal ascites some pockets demonstrating simple and   others complex fluid attenuation. Trace intra-abdominal gas consistent   with recent surgical history. No evidence of significant bowel wall   thickening, portal venous gas or mural pneumatosis to suggest bowel   ischemia.  4.  Small right and trace left pleural effusions.    The results of this examination were verbally communicated with read back   to the physician, Josey Clayton  on 4/10/2022 at 11 AM        --- End of Report ---          HERMILO BRAXTON MD; Resident Radiologist  This document has been electronically signed.  CARMEL VASQUEZ MD; Attending Radiologist  This document has been electronically signed. Apr 10 2022 11:04AM   42yo P2 POD2 s/p scheduled RA FELIXH BS, for fibroids (15wk size uterus w/ 1x anterior pedunculated fibroid about 3-4cm in size) c/b AUB and pelvic pain, presents today to [facility] ED with severe abdominal pain after finishing all her oxycodone pills. Pt denies fever, chills, chest pain, SOB, abdominal pain, nausea, vomiting, vaginal bleeding    OBhx: 2x   Gynhx: fibroids  PMH: anxiety  PSH: hernia repair 10+ years ago (mesh utilization not known to patient)  Meds: gabapentin 600 prn, Seroquel 500 qhs, ambien prn   All: nkda    PHYSICAL EXAM:   Vital Signs Last 24 Hrs  T(C): 36.4 (10 Apr 2022 21:00), Max: 36.9 (10 Apr 2022 01:42)  T(F): 97.5 (10 Apr 2022 21:00), Max: 98.4 (10 Apr 2022 01:42)  HR: 90 (10 Apr 2022 21:00) (90 - 115)  BP: 128/83 (10 Apr 2022 21:00) (105/69 - 144/88)  RR: 17 (10 Apr 2022 21:00) (16 - 19)  SpO2: 97% (10 Apr 2022 21:00) (95% - 97%)    PE pending      LABS:                        12.3   12.70 )-----------( 338      ( 10 Apr 2022 03:15 )             38.6     04-10    140  |  106  |  9   ----------------------------<  109<H>  4.0   |  25  |  0.76    Ca    8.6      10 Apr 2022 03:15    TPro  6.5  /  Alb  2.7<L>  /  TBili  0.2  /  DBili  x   /  AST  16  /  ALT  17  /  AlkPhos  73  04-10    PT/INR - ( 10 Apr 2022 03:15 )   PT: 10.1 sec;   INR: 0.86          PTT - ( 10 Apr 2022 03:15 )  PTT:28.9 sec  Urinalysis Basic - ( 10 Apr 2022 06:47 )    Color: Yellow / Appearance: Clear / S.010 / pH: x  Gluc: x / Ketone: NEGATIVE  / Bili: NEGATIVE / Urobili: 0.2 E.U./dL   Blood: x / Protein: NEGATIVE mg/dL / Nitrite: NEGATIVE   Leuk Esterase: NEGATIVE / RBC: < 5 /HPF / WBC < 5 /HPF   Sq Epi: x / Non Sq Epi: 5-10 /HPF / Bacteria: Present /HPF          RADIOLOGY & ADDITIONAL STUDIES:    ACC: [id number] EXAM:  CT ABDOMEN AND PELVIS IC                          PROCEDURE DATE:  04/10/2022          INTERPRETATION:  CLINICAL INFORMATION: Abdominal pain after hysterectomy   2 days ago    COMPARISON: Pelvic ultrasound dated 2022 and CT of the abdomen and   pelvis dated 3/26/2015.    CONTRAST/COMPLICATIONS:  IV Contrast: Omnipaque 350  99 cc administered   1 cc discarded  Oral Contrast: NONE  Complications: None reported at time of study completion    PROCEDURE:  CT of the Abdomen and Pelvis was performed.  Sagittal and coronal reformats were performed.    FINDINGS:  LOWER CHEST: Bibasilar linear atelectasis. Small right and trace left   pleural effusions.    LIVER: Within normal limits.  BILE DUCTS: Normal caliber.  GALLBLADDER: Within normal limits.  SPLEEN: Within normal limits.  PANCREAS: Within normal limits.  ADRENALS: Within normal limits.  KIDNEYS/URETERS: Within normal limits.    BLADDER: Small air in the bladder which is due to recent instrumentation.  REPRODUCTIVE ORGANS: Status post hysterectomy. No adnexal mass.    BOWEL: Limited evaluation without enteric contrast. Multiple dilated   loops of small bowel in the upper abdomen measuring 4.0 cm. There may be   a transition point in the left mid abdomen (2:60) with more normal return   of caliber of the distal bowel. No significant mural thickening is   identified. Fluid and gas is noted to the region of the rectum.  PERITONEUM: Moderate ascites some demonstrating simple and other pockets   complex attenuation with trace pneumoperitoneum, likely postoperative.  VESSELS: Within normal limits.  RETROPERITONEUM/LYMPH NODES: No lymphadenopathy.  ABDOMINAL WALL: Postoperative changes in the anterior abdominal wall.  BONES: Within normal limits.    IMPRESSION:  1.  Limited evaluation without enteric contrast. Findings compatible with   an incomplete small bowel obstruction with multiple dilated loops of the   proximal small bowel in the left upper quadrant with a transition point   thought to be in the left mid abdomen. No obstructing mass.   Intra-abdominal adhesions cannot be excluded.  2.  Mildly dilated fluid-filled loops of ascending and transverse colon   with gas noted to the rectum.  3.  Moderate abdominal ascites some pockets demonstrating simple and   others complex fluid attenuation. Trace intra-abdominal gas consistent   with recent surgical history. No evidence of significant bowel wall   thickening, portal venous gas or mural pneumatosis to suggest bowel   ischemia.  4.  Small right and trace left pleural effusions.    The results of this examination were verbally communicated with read back   to the physician, Josey Clayton  on 4/10/2022 at 11 AM        --- End of Report ---          HERMILO BRAXTON MD; Resident Radiologist  This document has been electronically signed.  CARMEL VASQUEZ MD; Attending Radiologist  This document has been electronically signed. Apr 10 2022 11:04AM   42yo P2 POD2 s/p scheduled RA TLH, BS, for fibroids (15wk size uterus w/ 1x anterior pedunculated fibroid about 3-4cm in size) c/b AUB and pelvic pain, presents today to [facility] ED with severe abdominal pain after finishing all her oxycodone pills. Pt denies fever, chills, chest pain, SOB, abdominal pain, nausea, vomiting, vaginal bleeding    OBhx: 2x   Gynhx: fibroids  PMH: anxiety  PSH: hernia repair 10+ years ago (mesh utilization not known to patient)  Meds: gabapentin 600 prn, Seroquel 500 qhs, ambien prn   All: nkda    PHYSICAL EXAM:   Vital Signs Last 24 Hrs  T(C): 36.4 (10 Apr 2022 21:00), Max: 36.9 (10 Apr 2022 01:42)  T(F): 97.5 (10 Apr 2022 21:00), Max: 98.4 (10 Apr 2022 01:42)  HR: 90 (10 Apr 2022 21:00) (90 - 115)  BP: 128/83 (10 Apr 2022 21:00) (105/69 - 144/88)  RR: 17 (10 Apr 2022 21:00) (16 - 19)  SpO2: 97% (10 Apr 2022 21:00) (95% - 97%)    Gen: NAD  Resp: no increased WOB  Abd: soft, moderately distended, mild guarding in left lower quadrant, no RUQ pain, no rebound/guarding, +BS, incisions c/d/i with old ecchymosis visible  : voiding  Ext: no incr swelling or calf tenderness b/l      LABS:                        12.3   12.70 )-----------( 338      ( 10 Apr 2022 03:15 )             38.6     04-10    140  |  106  |  9   ----------------------------<  109<H>  4.0   |  25  |  0.76    Ca    8.6      10 Apr 2022 03:15    TPro  6.5  /  Alb  2.7<L>  /  TBili  0.2  /  DBili  x   /  AST  16  /  ALT  17  /  AlkPhos  73  04-10    PT/INR - ( 10 Apr 2022 03:15 )   PT: 10.1 sec;   INR: 0.86          PTT - ( 10 Apr 2022 03:15 )  PTT:28.9 sec  Urinalysis Basic - ( 10 Apr 2022 06:47 )    Color: Yellow / Appearance: Clear / S.010 / pH: x  Gluc: x / Ketone: NEGATIVE  / Bili: NEGATIVE / Urobili: 0.2 E.U./dL   Blood: x / Protein: NEGATIVE mg/dL / Nitrite: NEGATIVE   Leuk Esterase: NEGATIVE / RBC: < 5 /HPF / WBC < 5 /HPF   Sq Epi: x / Non Sq Epi: 5-10 /HPF / Bacteria: Present /HPF          RADIOLOGY & ADDITIONAL STUDIES:    ACC: [id number] EXAM:  CT ABDOMEN AND PELVIS IC                          PROCEDURE DATE:  04/10/2022          INTERPRETATION:  CLINICAL INFORMATION: Abdominal pain after hysterectomy   2 days ago    COMPARISON: Pelvic ultrasound dated 2022 and CT of the abdomen and   pelvis dated 3/26/2015.    CONTRAST/COMPLICATIONS:  IV Contrast: Omnipaque 350  99 cc administered   1 cc discarded  Oral Contrast: NONE  Complications: None reported at time of study completion    PROCEDURE:  CT of the Abdomen and Pelvis was performed.  Sagittal and coronal reformats were performed.    FINDINGS:  LOWER CHEST: Bibasilar linear atelectasis. Small right and trace left   pleural effusions.    LIVER: Within normal limits.  BILE DUCTS: Normal caliber.  GALLBLADDER: Within normal limits.  SPLEEN: Within normal limits.  PANCREAS: Within normal limits.  ADRENALS: Within normal limits.  KIDNEYS/URETERS: Within normal limits.    BLADDER: Small air in the bladder which is due to recent instrumentation.  REPRODUCTIVE ORGANS: Status post hysterectomy. No adnexal mass.    BOWEL: Limited evaluation without enteric contrast. Multiple dilated   loops of small bowel in the upper abdomen measuring 4.0 cm. There may be   a transition point in the left mid abdomen (2:60) with more normal return   of caliber of the distal bowel. No significant mural thickening is   identified. Fluid and gas is noted to the region of the rectum.  PERITONEUM: Moderate ascites some demonstrating simple and other pockets   complex attenuation with trace pneumoperitoneum, likely postoperative.  VESSELS: Within normal limits.  RETROPERITONEUM/LYMPH NODES: No lymphadenopathy.  ABDOMINAL WALL: Postoperative changes in the anterior abdominal wall.  BONES: Within normal limits.    IMPRESSION:  1.  Limited evaluation without enteric contrast. Findings compatible with   an incomplete small bowel obstruction with multiple dilated loops of the   proximal small bowel in the left upper quadrant with a transition point   thought to be in the left mid abdomen. No obstructing mass.   Intra-abdominal adhesions cannot be excluded.  2.  Mildly dilated fluid-filled loops of ascending and transverse colon   with gas noted to the rectum.  3.  Moderate abdominal ascites some pockets demonstrating simple and   others complex fluid attenuation. Trace intra-abdominal gas consistent   with recent surgical history. No evidence of significant bowel wall   thickening, portal venous gas or mural pneumatosis to suggest bowel   ischemia.  4.  Small right and trace left pleural effusions.    The results of this examination were verbally communicated with read back   to the physician, Josey Clayton  on 4/10/2022 at 11 AM        --- End of Report ---          HERMILO BRAXTON MD; Resident Radiologist  This document has been electronically signed.  CARMEL VASQUEZ MD; Attending Radiologist  This document has been electronically signed. Apr 10 2022 11:04AM

## 2022-04-10 NOTE — ED PROVIDER NOTE - ATTENDING CONTRIBUTION TO CARE
Patient in room 16 of [facility], hx of umbilical hernia, today with partial SBO, admit to [facility]

## 2022-04-10 NOTE — ED PROVIDER NOTE - OBJECTIVE STATEMENT
40yo F with h/o umbilical hernia repair 10 years ago and hysterectomy for fibroids on 4/8/22 presents with c/o abd pain. was prescribed 12 tabs of oxycodone but reports she took 3 tabs at 1130pm pta and those were her last 3 tabs. denies fever, chills, nausea, vomiting, diarrhea, constipation. pain is worse with any movement or palpation.

## 2022-04-10 NOTE — ED ADULT NURSE REASSESSMENT NOTE - NS ED NURSE REASSESS COMMENT FT1
Pt aox3 with increased ab pain. Pt sx sites are clean and dry, open to air. Pt denies drainage or fevers. Pt denies nausea at this time.
pt breathing is even and unlabored. pain at acceptable level at this time, awaiting ct results

## 2022-04-10 NOTE — ED ADULT TRIAGE NOTE - CHIEF COMPLAINT QUOTE
Pt BIBA c/o L lower abd pain. States had outpatient hysterectomy at [facility] yesterday, was discharged with oxycodone 5mg and notes ran out of prescription. Pain persistent, +nausea without vomiting or diarrhea.

## 2022-04-10 NOTE — ED ADULT NURSE NOTE - CHIEF COMPLAINT QUOTE
Pt BIBA c/o L lower abd pain. States had outpatient hysterectomy at [facility] yesterday, was discharged with oxycodone 5mg and notes ran out of prescription. Pain persistent, +nausea without vomiting or diarrhea.

## 2022-04-10 NOTE — CONSULT NOTE ADULT - SUBJECTIVE AND OBJECTIVE BOX
GENERAL SURGERY CONSULT NOTE    HPI FROM PRIMARY TEAM:  40yo P2 POD2 s/p scheduled RA MADI ARCINIEGA, for fibroids (15wk size uterus w/ 1x anterior pedunculated fibroid about 3-4cm in size) c/b AUB and pelvic pain, presents today to [facility] ED with severe abdominal pain after finishing all her oxycodone pills. Pt denies fever, chills, chest pain, SOB, abdominal pain, nausea, vomiting, vaginal bleeding    OBhx: 2x   Gynhx: fibroids  PMH: anxiety  PSH: hernia repair 10+ years ago (mesh utilization not known to patient)  Meds: gabapentin 600 prn, Seroquel 500 qhs, ambien prn   All: nkda    SURGERY ADDENDUM: Patient seen and evaluated by surgery team at the bedside. At this time reports that her main complaint is LLQ and LUQ pain. Reports that the pain has been persistent since her hysterectomy and that medications (oxycodone) are not helping. She denies any symptoms of nausea or vomiting. She is passing gas and reports that she feels the need to have a BM. She has no fevers or chills. She is not having any diarrhea. Her main complaint is isolated abdominal pain.    OBJECTIVE    PHYSICAL EXAM:   Vital Signs Last 24 Hrs  T(C): 36.4 (10 Apr 2022 21:00), Max: 36.9 (10 Apr 2022 01:42)  T(F): 97.5 (10 Apr 2022 21:00), Max: 98.4 (10 Apr 2022 01:42)  HR: 90 (10 Apr 2022 21:00) (90 - 115)  BP: 128/83 (10 Apr 2022 21:00) (105/69 - 144/88)  RR: 17 (10 Apr 2022 21:00) (16 - 19)  SpO2: 97% (10 Apr 2022 21:00) (95% - 97%)    EXAM  Abdominal.  Gen: NAD  Cardiac: NSR  Resp: no increased WOB; no respiratory distress.  Abd: soft, moderately distended, tenderness to palpation of LUQ and LLQ. Active bowel sounds. No rebound tenderness or guarding. Laparoscopic port incisions healing appropriately without erythema or discharge.  : voiding freely  Ext: no increased swelling or calf tenderness b/l      LABS:                        12.3   12.70 )-----------( 338      ( 10 Apr 2022 03:15 )             38.6     04-10    140  |  106  |  9   ----------------------------<  109<H>  4.0   |  25  |  0.76    Ca    8.6      10 Apr 2022 03:15    TPro  6.5  /  Alb  2.7<L>  /  TBili  0.2  /  DBili  x   /  AST  16  /  ALT  17  /  AlkPhos  73  04-10    PT/INR - ( 10 Apr 2022 03:15 )   PT: 10.1 sec;   INR: 0.86          PTT - ( 10 Apr 2022 03:15 )  PTT:28.9 sec  Urinalysis Basic - ( 10 Apr 2022 06:47 )    Color: Yellow / Appearance: Clear / S.010 / pH: x  Gluc: x / Ketone: NEGATIVE  / Bili: NEGATIVE / Urobili: 0.2 E.U./dL   Blood: x / Protein: NEGATIVE mg/dL / Nitrite: NEGATIVE   Leuk Esterase: NEGATIVE / RBC: < 5 /HPF / WBC < 5 /HPF   Sq Epi: x / Non Sq Epi: 5-10 /HPF / Bacteria: Present /HPF          RADIOLOGY & ADDITIONAL STUDIES:    ACC: [id number] EXAM:  CT ABDOMEN AND PELVIS IC                          PROCEDURE DATE:  04/10/2022          INTERPRETATION:  CLINICAL INFORMATION: Abdominal pain after hysterectomy   2 days ago    COMPARISON: Pelvic ultrasound dated 2022 and CT of the abdomen and   pelvis dated 3/26/2015.    CONTRAST/COMPLICATIONS:  IV Contrast: Omnipaque 350  99 cc administered   1 cc discarded  Oral Contrast: NONE  Complications: None reported at time of study completion    PROCEDURE:  CT of the Abdomen and Pelvis was performed.  Sagittal and coronal reformats were performed.    FINDINGS:  LOWER CHEST: Bibasilar linear atelectasis. Small right and trace left   pleural effusions.    LIVER: Within normal limits.  BILE DUCTS: Normal caliber.  GALLBLADDER: Within normal limits.  SPLEEN: Within normal limits.  PANCREAS: Within normal limits.  ADRENALS: Within normal limits.  KIDNEYS/URETERS: Within normal limits.    BLADDER: Small air in the bladder which is due to recent instrumentation.  REPRODUCTIVE ORGANS: Status post hysterectomy. No adnexal mass.    BOWEL: Limited evaluation without enteric contrast. Multiple dilated   loops of small bowel in the upper abdomen measuring 4.0 cm. There may be   a transition point in the left mid abdomen (2:60) with more normal return   of caliber of the distal bowel. No significant mural thickening is   identified. Fluid and gas is noted to the region of the rectum.  PERITONEUM: Moderate ascites some demonstrating simple and other pockets   complex attenuation with trace pneumoperitoneum, likely postoperative.  VESSELS: Within normal limits.  RETROPERITONEUM/LYMPH NODES: No lymphadenopathy.  ABDOMINAL WALL: Postoperative changes in the anterior abdominal wall.  BONES: Within normal limits.    IMPRESSION:  1.  Limited evaluation without enteric contrast. Findings compatible with   an incomplete small bowel obstruction with multiple dilated loops of the   proximal small bowel in the left upper quadrant with a transition point   thought to be in the left mid abdomen. No obstructing mass.   Intra-abdominal adhesions cannot be excluded.  2.  Mildly dilated fluid-filled loops of ascending and transverse colon   with gas noted to the rectum.  3.  Moderate abdominal ascites some pockets demonstrating simple and   others complex fluid attenuation. Trace intra-abdominal gas consistent   with recent surgical history. No evidence of significant bowel wall   thickening, portal venous gas or mural pneumatosis to suggest bowel   ischemia.  4.  Small right and trace left pleural effusions.    The results of this examination were verbally communicated with read back   to the physician, Josey Clayton  on 4/10/2022 at 11 AM        --- End of Report ---          HERMILO BRAXTON MD; Resident Radiologist  This document has been electronically signed.  CARMEL VASQUEZ MD; Attending Radiologist  This document has been electronically signed. Apr 10 2022 11:04AM   (10 Apr 2022 21:18)    Assessment  Ms. Aby Brandon is a 41yF POD3 from a robot assisted laparoscopic hysterectomy for fibroids; general surgery consulted for evaluatino for SBO after CT scan demonstrated mildly dilated fluid filled loops of small bowel with possible TP in the left mid abdomen. Subjective exam notable for patient passing frequent gas and objective exam for normal bowel sounds.    Plan  1. Given CT findings of dilated small bowel loops with TP, recommend NGT for decompression. However, after extensive discussion with the patient, patient refusing NGT placement at this time despite appropriate education of risks.  2. Recommend frequent serial abdominal exams to assess for progression or changes in patients clinical status.  3. Surgery team 4c to continue to follow.     Plan discussed with chief resident on call and surgery attending.  Thomas Johnson MD PGY2  Surgery Resident

## 2022-04-10 NOTE — H&P ADULT - ASSESSMENT
42yo P2 POD2 s/p scheduled RA TLH, BS, for fibroids presenting to [facility] with abdominal pain and CT consistent with possible post-op ileus  Neuro: Tylenol/Motrin ATC, holding opiods for now  Cards: euvolemic  Resp: ORA  GI: diet per gen surg, simethicone atc, serial abdominal exams  GYN: POD s/p uncomplicated TLH BS  : voiding  PPX: SCDs, lovenox  Plan for AM CBC/CMP 42yo P2 POD2 s/p scheduled RA TLH, BS, for fibroids presenting to [facility] with abdominal pain and CT consistent with possible post-op ileus  Neuro: Tylenol/Motrin ATC, holding opiods for now, ambien prn, gabapentin 300prn, seroquel 500prn  Cards: euvolemic  Resp: ORA  GI: diet per gen surg, simethicone atc, serial abdominal exams, +/-  GYN: POD s/p uncomplicated TLH BS  : voiding  PPX: SCDs, lovenox  Plan for AM CBC/CMP 40yo P2 POD2 s/p scheduled RA TLH, BS, for fibroids presenting to [facility] with abdominal pain and CT consistent with possible post-op ileus  Neuro: Tylenol/Motrin ATC, holding opiods for now, ambien prn, gabapentin 300prn, seroquel 500prn  Cards: euvolemic  Resp: ORA  GI: diet per gen surg, simethicone atc, serial abdominal exams, +/-  GYN: POD2 s/p uncomplicated TLH BS  : voiding  PPX: SCDs, lovenox  Plan for AM CBC/CMP

## 2022-04-10 NOTE — ED PROVIDER NOTE - PROGRESS NOTE DETAILS
signed out to me by MAREK Mancia. disposition pending final CT read and reassessement. CT with prelim of ileus v obstruction. patient admits to improvement of nausea but abdominal pain has returned. initially improved after 2 rounds of IV morphine. signed out to me by MAREK Mancia. disposition pending final CT read and reassessement. CT with prelim of ileus v obstruction. PMHx umbilical hernia repair, s/p hysterectomy 2 days ago at [facility], last BM 3 days ago, not passing gas from below. patient admits to improvement of nausea but abdominal pain has returned. will give another dose of morphine spoke with Dr Galvez radiologist on call, dilated loops of small bowl with transition point in the L mid abdomen. call placed to Dr Umana's service, via transfer center. pain improved after last round of morphine and sitting still, but states pain returns on movement. [facility] unable to contact Dr Umana, spoke to Dr Rivera GYN on call and Dr. Adam surgery on call. patent to be admitted to GYN service with surgery consulting

## 2022-04-10 NOTE — PROGRESS NOTE ADULT - SUBJECTIVE AND OBJECTIVE BOX
GENERAL SURGERY CONSULT NOTE    HPI FROM PRIMARY TEAM:  42yo P2 POD2 s/p scheduled RA MADI ARCINIEGA, for fibroids (15wk size uterus w/ 1x anterior pedunculated fibroid about 3-4cm in size) c/b AUB and pelvic pain, presents today to [facility] ED with severe abdominal pain after finishing all her oxycodone pills. Pt denies fever, chills, chest pain, SOB, abdominal pain, nausea, vomiting, vaginal bleeding    OBhx: 2x   Gynhx: fibroids  PMH: anxiety  PSH: hernia repair 10+ years ago (mesh utilization not known to patient)  Meds: gabapentin 600 prn, Seroquel 500 qhs, ambien prn   All: nkda    SURGERY ADDENDUM: Patient seen and evaluated by surgery team at the bedside. At this time reports that her main complaint is LLQ and LUQ pain. Reports that the pain has been persistent since her hysterectomy and that medications (oxycodone) are not helping. She denies any symptoms of nausea or vomiting. She is passing gas and reports that she feels the need to have a BM. She has no fevers or chills. She is not having any diarrhea. Her main complaint is isolated abdominal pain.    OBJECTIVE    PHYSICAL EXAM:   Vital Signs Last 24 Hrs  T(C): 36.4 (10 Apr 2022 21:00), Max: 36.9 (10 Apr 2022 01:42)  T(F): 97.5 (10 Apr 2022 21:00), Max: 98.4 (10 Apr 2022 01:42)  HR: 90 (10 Apr 2022 21:00) (90 - 115)  BP: 128/83 (10 Apr 2022 21:00) (105/69 - 144/88)  RR: 17 (10 Apr 2022 21:00) (16 - 19)  SpO2: 97% (10 Apr 2022 21:00) (95% - 97%)    Abdominal.  Gen: NAD  Cardiac: NSR  Resp: no increased WOB; no respiratory distress.  Abd: soft, moderately distended, tenderness to palpation of LUQ and LLQ. Active bowel sounds. No rebound tenderness or guarding. Laparoscopic port incisions healing appropriately without erythema or discharge.  : voiding freely  Ext: no increased swelling or calf tenderness b/l      LABS:                        12.3   12.70 )-----------( 338      ( 10 Apr 2022 03:15 )             38.6     04-10    140  |  106  |  9   ----------------------------<  109<H>  4.0   |  25  |  0.76    Ca    8.6      10 Apr 2022 03:15    TPro  6.5  /  Alb  2.7<L>  /  TBili  0.2  /  DBili  x   /  AST  16  /  ALT  17  /  AlkPhos  73  04-10    PT/INR - ( 10 Apr 2022 03:15 )   PT: 10.1 sec;   INR: 0.86          PTT - ( 10 Apr 2022 03:15 )  PTT:28.9 sec  Urinalysis Basic - ( 10 Apr 2022 06:47 )    Color: Yellow / Appearance: Clear / S.010 / pH: x  Gluc: x / Ketone: NEGATIVE  / Bili: NEGATIVE / Urobili: 0.2 E.U./dL   Blood: x / Protein: NEGATIVE mg/dL / Nitrite: NEGATIVE   Leuk Esterase: NEGATIVE / RBC: < 5 /HPF / WBC < 5 /HPF   Sq Epi: x / Non Sq Epi: 5-10 /HPF / Bacteria: Present /HPF          RADIOLOGY & ADDITIONAL STUDIES:    ACC: [id number] EXAM:  CT ABDOMEN AND PELVIS IC                          PROCEDURE DATE:  04/10/2022          INTERPRETATION:  CLINICAL INFORMATION: Abdominal pain after hysterectomy   2 days ago    COMPARISON: Pelvic ultrasound dated 2022 and CT of the abdomen and   pelvis dated 3/26/2015.    CONTRAST/COMPLICATIONS:  IV Contrast: Omnipaque 350  99 cc administered   1 cc discarded  Oral Contrast: NONE  Complications: None reported at time of study completion    PROCEDURE:  CT of the Abdomen and Pelvis was performed.  Sagittal and coronal reformats were performed.    FINDINGS:  LOWER CHEST: Bibasilar linear atelectasis. Small right and trace left   pleural effusions.    LIVER: Within normal limits.  BILE DUCTS: Normal caliber.  GALLBLADDER: Within normal limits.  SPLEEN: Within normal limits.  PANCREAS: Within normal limits.  ADRENALS: Within normal limits.  KIDNEYS/URETERS: Within normal limits.    BLADDER: Small air in the bladder which is due to recent instrumentation.  REPRODUCTIVE ORGANS: Status post hysterectomy. No adnexal mass.    BOWEL: Limited evaluation without enteric contrast. Multiple dilated   loops of small bowel in the upper abdomen measuring 4.0 cm. There may be   a transition point in the left mid abdomen (2:60) with more normal return   of caliber of the distal bowel. No significant mural thickening is   identified. Fluid and gas is noted to the region of the rectum.  PERITONEUM: Moderate ascites some demonstrating simple and other pockets   complex attenuation with trace pneumoperitoneum, likely postoperative.  VESSELS: Within normal limits.  RETROPERITONEUM/LYMPH NODES: No lymphadenopathy.  ABDOMINAL WALL: Postoperative changes in the anterior abdominal wall.  BONES: Within normal limits.    IMPRESSION:  1.  Limited evaluation without enteric contrast. Findings compatible with   an incomplete small bowel obstruction with multiple dilated loops of the   proximal small bowel in the left upper quadrant with a transition point   thought to be in the left mid abdomen. No obstructing mass.   Intra-abdominal adhesions cannot be excluded.  2.  Mildly dilated fluid-filled loops of ascending and transverse colon   with gas noted to the rectum.  3.  Moderate abdominal ascites some pockets demonstrating simple and   others complex fluid attenuation. Trace intra-abdominal gas consistent   with recent surgical history. No evidence of significant bowel wall   thickening, portal venous gas or mural pneumatosis to suggest bowel   ischemia.  4.  Small right and trace left pleural effusions.    The results of this examination were verbally communicated with read back   to the physician, Josey Clayton  on 4/10/2022 at 11 AM        --- End of Report ---          HERMILO BRAXTON MD; Resident Radiologist  This document has been electronically signed.  CARMEL VASQUEZ MD; Attending Radiologist  This document has been electronically signed. Apr 10 2022 11:04AM   (10 Apr 2022 21:18)    Assessment  Ms. Aby Brandon is a 41yF POD3 from a robot assisted laparoscopic hysterectomy for fibroids; general surgery consulted for evaluatino for SBO after CT scan demonstrated mildly dilated fluid filled loops of small bowel with possible TP in the left mid abdomen. Subjective exam notable for patient passing frequent gas and objective exam for normal bowel sounds.    Plan  1. Given CT findings of dilated small bowel loops with TP, recommend NGT for decompression. However, after extensive discussion with the patient, patient refusing NGT placement at this time despite appropriate education of risks.  2. Recommend frequent serial abdominal exams to assess for progression or changes in patients clinical status.  3. Surgery team 4c to continue to follow.     Plan discussed with chief resident on call and surgery attending.  Thomas Johnson MD PGY2  Surgery Resident

## 2022-04-10 NOTE — ED PROVIDER NOTE - NS ED ATTENDING STATEMENT MOD
This was a shared visit with the OVI. I reviewed and verified the documentation and independently performed the documented:

## 2022-04-10 NOTE — ED PROVIDER NOTE - CLINICAL SUMMARY MEDICAL DECISION MAKING FREE TEXT BOX
pt presents with significant abd pain 2 days s/p hysterectomy. abd mildly distended with diffuse tenderness. no wound dehiscence, small ecchymosis. will obtain labs and give fluids and analgesia.

## 2022-04-10 NOTE — ED ADULT NURSE NOTE - NSICDXPASTSURGICALHX_GEN_ALL_CORE_FT
PAST SURGICAL HISTORY:  H/O fracture of foot s/p surgery, and removal of hardware; right    H/O umbilical hernia repair     S/P hysterectomy

## 2022-04-11 LAB
ANION GAP SERPL CALC-SCNC: 9 MMOL/L — SIGNIFICANT CHANGE UP (ref 5–17)
BUN SERPL-MCNC: 8 MG/DL — SIGNIFICANT CHANGE UP (ref 7–23)
CALCIUM SERPL-MCNC: 7.8 MG/DL — LOW (ref 8.4–10.5)
CHLORIDE SERPL-SCNC: 105 MMOL/L — SIGNIFICANT CHANGE UP (ref 96–108)
CO2 SERPL-SCNC: 25 MMOL/L — SIGNIFICANT CHANGE UP (ref 22–31)
CREAT SERPL-MCNC: 0.65 MG/DL — SIGNIFICANT CHANGE UP (ref 0.5–1.3)
CULTURE RESULTS: NO GROWTH — SIGNIFICANT CHANGE UP
EGFR: 114 ML/MIN/1.73M2 — SIGNIFICANT CHANGE UP
GLUCOSE SERPL-MCNC: 106 MG/DL — HIGH (ref 70–99)
HCT VFR BLD CALC: 29.3 % — LOW (ref 34.5–45)
HCT VFR BLD CALC: 31.8 % — LOW (ref 34.5–45)
HGB BLD-MCNC: 10.2 G/DL — LOW (ref 11.5–15.5)
HGB BLD-MCNC: 9.2 G/DL — LOW (ref 11.5–15.5)
MAGNESIUM SERPL-MCNC: 2 MG/DL — SIGNIFICANT CHANGE UP (ref 1.6–2.6)
MCHC RBC-ENTMCNC: 25.6 PG — LOW (ref 27–34)
MCHC RBC-ENTMCNC: 26.6 PG — LOW (ref 27–34)
MCHC RBC-ENTMCNC: 31.4 GM/DL — LOW (ref 32–36)
MCHC RBC-ENTMCNC: 32.1 GM/DL — SIGNIFICANT CHANGE UP (ref 32–36)
MCV RBC AUTO: 81.4 FL — SIGNIFICANT CHANGE UP (ref 80–100)
MCV RBC AUTO: 83 FL — SIGNIFICANT CHANGE UP (ref 80–100)
NRBC # BLD: 0 /100 WBCS — SIGNIFICANT CHANGE UP (ref 0–0)
NRBC # BLD: 0 /100 WBCS — SIGNIFICANT CHANGE UP (ref 0–0)
PHOSPHATE SERPL-MCNC: 2.4 MG/DL — LOW (ref 2.5–4.5)
PLATELET # BLD AUTO: 293 K/UL — SIGNIFICANT CHANGE UP (ref 150–400)
PLATELET # BLD AUTO: 332 K/UL — SIGNIFICANT CHANGE UP (ref 150–400)
POTASSIUM SERPL-MCNC: 3.4 MMOL/L — LOW (ref 3.5–5.3)
POTASSIUM SERPL-SCNC: 3.4 MMOL/L — LOW (ref 3.5–5.3)
RBC # BLD: 3.6 M/UL — LOW (ref 3.8–5.2)
RBC # BLD: 3.83 M/UL — SIGNIFICANT CHANGE UP (ref 3.8–5.2)
RBC # FLD: 14.6 % — HIGH (ref 10.3–14.5)
RBC # FLD: 14.7 % — HIGH (ref 10.3–14.5)
SODIUM SERPL-SCNC: 139 MMOL/L — SIGNIFICANT CHANGE UP (ref 135–145)
SPECIMEN SOURCE: SIGNIFICANT CHANGE UP
WBC # BLD: 8.64 K/UL — SIGNIFICANT CHANGE UP (ref 3.8–10.5)
WBC # BLD: 9.64 K/UL — SIGNIFICANT CHANGE UP (ref 3.8–10.5)
WBC # FLD AUTO: 8.64 K/UL — SIGNIFICANT CHANGE UP (ref 3.8–10.5)
WBC # FLD AUTO: 9.64 K/UL — SIGNIFICANT CHANGE UP (ref 3.8–10.5)

## 2022-04-11 RX ORDER — POTASSIUM PHOSPHATE, MONOBASIC POTASSIUM PHOSPHATE, DIBASIC 236; 224 MG/ML; MG/ML
15 INJECTION, SOLUTION INTRAVENOUS ONCE
Refills: 0 | Status: COMPLETED | OUTPATIENT
Start: 2022-04-11 | End: 2022-04-11

## 2022-04-11 RX ORDER — ZOLPIDEM TARTRATE 10 MG/1
5 TABLET ORAL ONCE
Refills: 0 | Status: DISCONTINUED | OUTPATIENT
Start: 2022-04-11 | End: 2022-04-11

## 2022-04-11 RX ORDER — ONDANSETRON 8 MG/1
4 TABLET, FILM COATED ORAL EVERY 6 HOURS
Refills: 0 | Status: DISCONTINUED | OUTPATIENT
Start: 2022-04-11 | End: 2022-04-12

## 2022-04-11 RX ORDER — SODIUM CHLORIDE 9 MG/ML
1000 INJECTION, SOLUTION INTRAVENOUS
Refills: 0 | Status: DISCONTINUED | OUTPATIENT
Start: 2022-04-11 | End: 2022-04-12

## 2022-04-11 RX ORDER — IBUPROFEN 200 MG
600 TABLET ORAL EVERY 6 HOURS
Refills: 0 | Status: DISCONTINUED | OUTPATIENT
Start: 2022-04-11 | End: 2022-04-12

## 2022-04-11 RX ADMIN — Medication 600 MILLIGRAM(S): at 22:43

## 2022-04-11 RX ADMIN — QUETIAPINE FUMARATE 300 MILLIGRAM(S): 200 TABLET, FILM COATED ORAL at 21:19

## 2022-04-11 RX ADMIN — Medication 600 MILLIGRAM(S): at 01:22

## 2022-04-11 RX ADMIN — QUETIAPINE FUMARATE 300 MILLIGRAM(S): 200 TABLET, FILM COATED ORAL at 03:37

## 2022-04-11 RX ADMIN — ZOLPIDEM TARTRATE 5 MILLIGRAM(S): 10 TABLET ORAL at 21:20

## 2022-04-11 RX ADMIN — SIMETHICONE 80 MILLIGRAM(S): 80 TABLET, CHEWABLE ORAL at 05:42

## 2022-04-11 RX ADMIN — SIMETHICONE 80 MILLIGRAM(S): 80 TABLET, CHEWABLE ORAL at 10:18

## 2022-04-11 RX ADMIN — Medication 975 MILLIGRAM(S): at 16:24

## 2022-04-11 RX ADMIN — ZOLPIDEM TARTRATE 5 MILLIGRAM(S): 10 TABLET ORAL at 14:50

## 2022-04-11 RX ADMIN — Medication 975 MILLIGRAM(S): at 21:20

## 2022-04-11 RX ADMIN — QUETIAPINE FUMARATE 200 MILLIGRAM(S): 200 TABLET, FILM COATED ORAL at 21:19

## 2022-04-11 RX ADMIN — Medication 600 MILLIGRAM(S): at 23:43

## 2022-04-11 RX ADMIN — Medication 30 MILLIGRAM(S): at 00:13

## 2022-04-11 RX ADMIN — Medication 600 MILLIGRAM(S): at 11:24

## 2022-04-11 RX ADMIN — ZOLPIDEM TARTRATE 5 MILLIGRAM(S): 10 TABLET ORAL at 01:45

## 2022-04-11 RX ADMIN — SIMETHICONE 80 MILLIGRAM(S): 80 TABLET, CHEWABLE ORAL at 21:19

## 2022-04-11 RX ADMIN — Medication 975 MILLIGRAM(S): at 17:24

## 2022-04-11 RX ADMIN — Medication 975 MILLIGRAM(S): at 22:20

## 2022-04-11 RX ADMIN — SODIUM CHLORIDE 125 MILLILITER(S): 9 INJECTION, SOLUTION INTRAVENOUS at 01:30

## 2022-04-11 RX ADMIN — QUETIAPINE FUMARATE 200 MILLIGRAM(S): 200 TABLET, FILM COATED ORAL at 03:37

## 2022-04-11 RX ADMIN — POTASSIUM PHOSPHATE, MONOBASIC POTASSIUM PHOSPHATE, DIBASIC 62.5 MILLIMOLE(S): 236; 224 INJECTION, SOLUTION INTRAVENOUS at 19:34

## 2022-04-11 RX ADMIN — SIMETHICONE 80 MILLIGRAM(S): 80 TABLET, CHEWABLE ORAL at 16:40

## 2022-04-11 RX ADMIN — Medication 600 MILLIGRAM(S): at 02:39

## 2022-04-11 NOTE — PATIENT PROFILE ADULT - FALL HARM RISK - HARM RISK INTERVENTIONS

## 2022-04-11 NOTE — PROGRESS NOTE ADULT - ASSESSMENT
41yF POD4 from a robot assisted laparoscopic hysterectomy for fibroids; general surgery consulted for evaluation for SBO after CT scan demonstrated mildly dilated fluid filled loops of small bowel with possible TP in the left mid abdomen.     Patient is passing gas, no bowel movement yet. Patient refused NG tube decompression. Afebrile, hemodynamically stable. Abdomen still distended and tender to palpation, no peritoneal signs.    Plan  1. If nauseous place NG tube. Because she is still having abdominal pain and no bowel movement yet continue NPO.   2. Recommend frequent serial abdominal exams to assess for progression or changes in patients clinical status.  3. Avoid opioids  4. OOB and AMB, incentive spirometer   5. Continue care by primary team  6. Surgery team 4c to continue to follow.     Plan discussed with chief resident on call and surgery attending.

## 2022-04-11 NOTE — CHART NOTE - NSCHARTNOTEFT_GEN_A_CORE
pod 3/hd 2 s/p scheduled TLH bs robotic  readm for pain no nv  ct with ? SBO  declined NG tube  today with BM x2 and flatus  ate 2 slices of pizza and tolerated it  (advised not to have pizza as on a clear diet per GS)  no nv  vss     Gen awake alert nt  Ab: + distended, +BS no r no garding  ext nt    A/P  general surg rec appreciated  consider dc home of soft bland diet  today or tomorrow per GS

## 2022-04-11 NOTE — PROGRESS NOTE ADULT - ASSESSMENT
42yo P2 POD3 s/p scheduled RA TLH, BS, for fibroids (15wk size uterus w/ 1x anterior pedunculated fibroid about 3-4cm in size) c/b AUB and pelvic pain, presents to [facility] ED with severe lower abdominal pain after finishing all her oxycodone pills (6 total) transferred for [facility] for concerning CT imaging for post-op ileus vs. SBO. No N/V. +BS, passing flatus.     Neuro: Tylenol/Motrin ATC, holding opiods for now, ambien prn, gabapentin 300prn, seroquel 500prn  Cards: euvolemic  Resp: RA  GI: D5/LR @ 125, NPO, simethicone atc, zofran prn, serial abdominal exams, +/-  GYN: s/p uncomplicated TLH BS 4/8   : voiding  PPX: SCDs    Will f/u AM labs  Gen surg following - appreciate recs. Attempted NGT last night but pt refused. Kept NPO overnight.   No need for lovenox as pt is ambulating

## 2022-04-11 NOTE — PATIENT PROFILE ADULT - DO YOU LACK THE NECESSARY SUPPORT TO HELP YOU COPE WITH LIFE CHALLENGES?
Telephone Encounter by Kerley, Jasmine, RMA at 06/16/17 08:31 AM     Author:  Kerley, Jasmine, RMA Service:  (none) Author Type:  Certified Medical Assistant     Filed:  06/16/17 08:31 AM Encounter Date:  6/15/2017 Status:  Signed     :  Kerley, Jasmine, RMA (Certified Medical Assistant)            please see below[JK1.1M]  Electronically Signed by:    Jasmine Kerley, RMA , 6/16/2017[JK1.1T]        Revision History        User Key Date/Time User Provider Type Action    > [email or web address] 06/16/17 08:31 AM Kerley, Jasmine, RMA Certified Medical Assistant Sign    M - Manual, T - Template             no

## 2022-04-11 NOTE — PROGRESS NOTE ADULT - SUBJECTIVE AND OBJECTIVE BOX
SUBJECTIVE: Patient seen and examined bedside.        Vital Signs Last 24 Hrs  T(C): 36.4 (2022 04:43), Max: 36.9 (10 Apr 2022 09:16)  T(F): 97.5 (2022 04:43), Max: 98.4 (10 Apr 2022 09:16)  HR: 86 (2022 04:43) (86 - 100)  BP: 106/65 (2022 04:43) (106/65 - 144/88)  BP(mean): --  RR: 18 (2022 04:43) (16 - 18)  SpO2: 94% (2022 04:43) (94% - 97%)  I&O's Detail    10 Apr 2022 07:01  -  2022 07:00  --------------------------------------------------------  IN:  Total IN: 0 mL    OUT:    Voided (mL): 550 mL  Total OUT: 550 mL    Total NET: -550 mL          General: NAD, resting comfortably in bed  C/V: NSR  Pulm: Nonlabored breathing, no respiratory distress  Abd: soft, NT/ND.  Extrem: WWP, no edema, SCDs in place        LABS:                        12.3   12.70 )-----------( 338      ( 10 Apr 2022 03:15 )             38.6     04-10    140  |  106  |  9   ----------------------------<  109<H>  4.0   |  25  |  0.76    Ca    8.6      10 Apr 2022 03:15    TPro  6.5  /  Alb  2.7<L>  /  TBili  0.2  /  DBili  x   /  AST  16  /  ALT  17  /  AlkPhos  73  04-10    PT/INR - ( 10 Apr 2022 03:15 )   PT: 10.1 sec;   INR: 0.86          PTT - ( 10 Apr 2022 03:15 )  PTT:28.9 sec  Urinalysis Basic - ( 10 Apr 2022 06:47 )    Color: Yellow / Appearance: Clear / S.010 / pH: x  Gluc: x / Ketone: NEGATIVE  / Bili: NEGATIVE / Urobili: 0.2 E.U./dL   Blood: x / Protein: NEGATIVE mg/dL / Nitrite: NEGATIVE   Leuk Esterase: NEGATIVE / RBC: < 5 /HPF / WBC < 5 /HPF   Sq Epi: x / Non Sq Epi: 5-10 /HPF / Bacteria: Present /HPF        RADIOLOGY & ADDITIONAL STUDIES:   SUBJECTIVE: Patient seen and examined bedside. Patient passing gas, denying nausea or emesis. She has abdominal pain of the RUQ and RLQ.     Vital Signs Last 24 Hrs  T(C): 36.4 (2022 04:43), Max: 36.9 (10 Apr 2022 09:16)  T(F): 97.5 (2022 04:43), Max: 98.4 (10 Apr 2022 09:16)  HR: 86 (:43) (86 - 100)  BP: 106/65 (2022 04:43) (106/65 - 144/88)  BP(mean): --  RR: 18 (:43) (16 - 18)  SpO2: 94% (:43) (94% - 97%)  I&O's Detail    10 Apr 2022 07:01  -  2022 07:00  --------------------------------------------------------  IN:  Total IN: 0 mL    OUT:    Voided (mL): 550 mL  Total OUT: 550 mL    Total NET: -550 mL      General: NAD, resting comfortably in bed  C/V: NSR  Pulm: Nonlabored breathing, no respiratory distress  Abd: Distended, tenderness upper abdomen. No peritoneal signs. Incision clean and dry, no purulence.   Extrem: WWP, no edema, SCDs in place        LABS:                        12.3   12.70 )-----------( 338      ( 10 Apr 2022 03:15 )             38.6     04-10    140  |  106  |  9   ----------------------------<  109<H>  4.0   |  25  |  0.76    Ca    8.6      10 Apr 2022 03:15    TPro  6.5  /  Alb  2.7<L>  /  TBili  0.2  /  DBili  x   /  AST  16  /  ALT  17  /  AlkPhos  73  04-10    PT/INR - ( 10 Apr 2022 03:15 )   PT: 10.1 sec;   INR: 0.86          PTT - ( 10 Apr 2022 03:15 )  PTT:28.9 sec  Urinalysis Basic - ( 10 Apr 2022 06:47 )    Color: Yellow / Appearance: Clear / S.010 / pH: x  Gluc: x / Ketone: NEGATIVE  / Bili: NEGATIVE / Urobili: 0.2 E.U./dL   Blood: x / Protein: NEGATIVE mg/dL / Nitrite: NEGATIVE   Leuk Esterase: NEGATIVE / RBC: < 5 /HPF / WBC < 5 /HPF   Sq Epi: x / Non Sq Epi: 5-10 /HPF / Bacteria: Present /HPF        RADIOLOGY & ADDITIONAL STUDIES:    ACC: [id number] EXAM: CT ABDOMEN AND PELVIS IC    PROCEDURE DATE: 04/10/2022        INTERPRETATION: CLINICAL INFORMATION: Abdominal pain after hysterectomy 2 days ago    COMPARISON: Pelvic ultrasound dated 2022 and CT of the abdomen and pelvis dated 3/26/2015.    CONTRAST/COMPLICATIONS:  IV Contrast: Omnipaque 350 99 cc administered 1 cc discarded  Oral Contrast: NONE  Complications: None reported at time of study completion    PROCEDURE:  CT of the Abdomen and Pelvis was performed.  Sagittal and coronal reformats were performed.    FINDINGS:  LOWER CHEST: Bibasilar linear atelectasis. Small right and trace left pleural effusions.    LIVER: Within normal limits.  BILE DUCTS: Normal caliber.  GALLBLADDER: Within normal limits.  SPLEEN: Within normal limits.  PANCREAS: Within normal limits.  ADRENALS: Within normal limits.  KIDNEYS/URETERS: Within normal limits.    BLADDER: Small air in the bladder which is due to recent instrumentation.  REPRODUCTIVE ORGANS: Status post hysterectomy. No adnexal mass.    BOWEL: Limited evaluation without enteric contrast. Multiple dilated loops of small bowel in the upper abdomen measuring 4.0 cm. There may be a transition point in the left mid abdomen (2:60) with more normal return of caliber of the distal bowel. No significant mural thickening is identified. Fluid and gas is noted to the region of the rectum.  PERITONEUM: Moderate ascites some demonstrating simple and other pockets complex attenuation with trace pneumoperitoneum, likely postoperative.  VESSELS: Within normal limits.  RETROPERITONEUM/LYMPH NODES: No lymphadenopathy.  ABDOMINAL WALL: Postoperative changes in the anterior abdominal wall.  BONES: Within normal limits.    IMPRESSION:  1. Limited evaluation without enteric contrast. Findings compatible with an incomplete small bowel obstruction with multiple dilated loops of the proximal small bowel in the left upper quadrant with a transition point thought to be in the left mid abdomen. No obstructing mass. Intra-abdominal adhesions cannot be excluded.  2. Mildly dilated fluid-filled loops of ascending and transverse colon with gas noted to the rectum.  3. Moderate abdominal ascites some pockets demonstrating simple and others complex fluid attenuation. Trace intra-abdominal gas consistent with recent surgical history. No evidence of significant bowel wall thickening, portal venous gas or mural pneumatosis to suggest bowel ischemia.  4. Small right and trace left pleural effusions.    The results of this examination were verbally communicated with read back to the physician, Josey Clayton on 4/10/2022 at 11 AM        --- End of Report ---

## 2022-04-11 NOTE — PROGRESS NOTE ADULT - SUBJECTIVE AND OBJECTIVE BOX
Pt seen and examined at bedside. Pt states no abdominal pain this morning. Pt meeting all post-op milestones and reports she is hungry. Ambulating, passing gas, voiding. NPO for now. Denies nausea/vomiting.     T(F): 97.5 (22 @ 04:43), Max: 98.4 (04-10-22 @ 09:16)  HR: 86 (22 @ 04:43) (86 - 100)  BP: 106/65 (22 @ 04:43) (106/65 - 144/88)  RR: 18 (22 @ 04:43) (16 - 18)  SpO2: 94% (22 @ 04:43) (94% - 97%)    I&O's Summary    10 Apr 2022 07:01  -  2022 07:00  --------------------------------------------------------  IN: 0 mL / OUT: 550 mL / NET: -550 mL    MEDICATIONS  (STANDING):  acetaminophen     Tablet .. 975 milliGRAM(s) Oral every 6 hours  dextrose 5% + lactated ringers. 1000 milliLiter(s) (125 mL/Hr) IV Continuous <Continuous>  ibuprofen  Tablet. 600 milliGRAM(s) Oral every 6 hours  QUEtiapine 200 milliGRAM(s) Oral at bedtime  QUEtiapine 300 milliGRAM(s) Oral at bedtime  simethicone 80 milliGRAM(s) Chew every 6 hours  zolpidem 5 milliGRAM(s) Oral at bedtime    MEDICATIONS  (PRN):  acetaminophen   IVPB .. 1000 milliGRAM(s) IV Intermittent once PRN Moderate Pain (4 - 6)  gabapentin 600 milliGRAM(s) Oral every 24 hours PRN pain  ondansetron Injectable 4 milliGRAM(s) IV Push every 6 hours PRN Nausea and/or Vomiting  zolpidem 5 milliGRAM(s) Oral once PRN Insomnia      Physical Exam:  Constitutional: NAD  Pulmonary: no incr. WOB  Abdomen: incision sites clean, dry, intact with mild ecchymosis. Soft, mildly tender, moderate distended and tympanic, mild guarding, no rebound, +bowel sounds  : voiding  Extremities: no lower extremity edema or calf tenderness. SCDs in place.     LABS:                        12.3   12.70 )-----------( 338      ( 10 Apr 2022 03:15 )             38.6     04-10    140  |  106  |  9   ----------------------------<  109<H>  4.0   |  25  |  0.76    Ca    8.6      10 Apr 2022 03:15    TPro  6.5  /  Alb  2.7<L>  /  TBili  0.2  /  DBili  x   /  AST  16  /  ALT  17  /  AlkPhos  73  04-10    PT/INR - ( 10 Apr 2022 03:15 )   PT: 10.1 sec;   INR: 0.86          PTT - ( 10 Apr 2022 03:15 )  PTT:28.9 sec  Urinalysis Basic - ( 10 Apr 2022 06:47 )    Color: Yellow / Appearance: Clear / S.010 / pH: x  Gluc: x / Ketone: NEGATIVE  / Bili: NEGATIVE / Urobili: 0.2 E.U./dL   Blood: x / Protein: NEGATIVE mg/dL / Nitrite: NEGATIVE   Leuk Esterase: NEGATIVE / RBC: < 5 /HPF / WBC < 5 /HPF   Sq Epi: x / Non Sq Epi: 5-10 /HPF / Bacteria: Present /HPF        RADIOLOGY & ADDITIONAL TESTS:

## 2022-04-12 ENCOUNTER — TRANSCRIPTION ENCOUNTER (OUTPATIENT)
Age: 42
End: 2022-04-12

## 2022-04-12 VITALS
HEART RATE: 70 BPM | RESPIRATION RATE: 17 BRPM | OXYGEN SATURATION: 95 % | SYSTOLIC BLOOD PRESSURE: 149 MMHG | TEMPERATURE: 98 F | DIASTOLIC BLOOD PRESSURE: 88 MMHG

## 2022-04-12 LAB
ANION GAP SERPL CALC-SCNC: 8 MMOL/L — SIGNIFICANT CHANGE UP (ref 5–17)
BASOPHILS # BLD AUTO: 0.04 K/UL — SIGNIFICANT CHANGE UP (ref 0–0.2)
BASOPHILS NFR BLD AUTO: 0.5 % — SIGNIFICANT CHANGE UP (ref 0–2)
BUN SERPL-MCNC: 7 MG/DL — SIGNIFICANT CHANGE UP (ref 7–23)
CALCIUM SERPL-MCNC: 8.4 MG/DL — SIGNIFICANT CHANGE UP (ref 8.4–10.5)
CHLORIDE SERPL-SCNC: 108 MMOL/L — SIGNIFICANT CHANGE UP (ref 96–108)
CO2 SERPL-SCNC: 25 MMOL/L — SIGNIFICANT CHANGE UP (ref 22–31)
CREAT SERPL-MCNC: 0.7 MG/DL — SIGNIFICANT CHANGE UP (ref 0.5–1.3)
EGFR: 112 ML/MIN/1.73M2 — SIGNIFICANT CHANGE UP
EOSINOPHIL # BLD AUTO: 0.39 K/UL — SIGNIFICANT CHANGE UP (ref 0–0.5)
EOSINOPHIL NFR BLD AUTO: 4.9 % — SIGNIFICANT CHANGE UP (ref 0–6)
GLUCOSE SERPL-MCNC: 98 MG/DL — SIGNIFICANT CHANGE UP (ref 70–99)
HCT VFR BLD CALC: 30.5 % — LOW (ref 34.5–45)
HGB BLD-MCNC: 10 G/DL — LOW (ref 11.5–15.5)
IMM GRANULOCYTES NFR BLD AUTO: 0.6 % — SIGNIFICANT CHANGE UP (ref 0–1.5)
LYMPHOCYTES # BLD AUTO: 3.24 K/UL — SIGNIFICANT CHANGE UP (ref 1–3.3)
LYMPHOCYTES # BLD AUTO: 40.6 % — SIGNIFICANT CHANGE UP (ref 13–44)
MAGNESIUM SERPL-MCNC: 2.1 MG/DL — SIGNIFICANT CHANGE UP (ref 1.6–2.6)
MCHC RBC-ENTMCNC: 27 PG — SIGNIFICANT CHANGE UP (ref 27–34)
MCHC RBC-ENTMCNC: 32.8 GM/DL — SIGNIFICANT CHANGE UP (ref 32–36)
MCV RBC AUTO: 82.4 FL — SIGNIFICANT CHANGE UP (ref 80–100)
MONOCYTES # BLD AUTO: 0.84 K/UL — SIGNIFICANT CHANGE UP (ref 0–0.9)
MONOCYTES NFR BLD AUTO: 10.5 % — SIGNIFICANT CHANGE UP (ref 2–14)
NEUTROPHILS # BLD AUTO: 3.42 K/UL — SIGNIFICANT CHANGE UP (ref 1.8–7.4)
NEUTROPHILS NFR BLD AUTO: 42.9 % — LOW (ref 43–77)
NRBC # BLD: 0 /100 WBCS — SIGNIFICANT CHANGE UP (ref 0–0)
PHOSPHATE SERPL-MCNC: 3.1 MG/DL — SIGNIFICANT CHANGE UP (ref 2.5–4.5)
PLATELET # BLD AUTO: 337 K/UL — SIGNIFICANT CHANGE UP (ref 150–400)
POTASSIUM SERPL-MCNC: 3.7 MMOL/L — SIGNIFICANT CHANGE UP (ref 3.5–5.3)
POTASSIUM SERPL-SCNC: 3.7 MMOL/L — SIGNIFICANT CHANGE UP (ref 3.5–5.3)
RBC # BLD: 3.7 M/UL — LOW (ref 3.8–5.2)
RBC # FLD: 14.4 % — SIGNIFICANT CHANGE UP (ref 10.3–14.5)
SODIUM SERPL-SCNC: 141 MMOL/L — SIGNIFICANT CHANGE UP (ref 135–145)
SURGICAL PATHOLOGY STUDY: SIGNIFICANT CHANGE UP
WBC # BLD: 7.98 K/UL — SIGNIFICANT CHANGE UP (ref 3.8–10.5)
WBC # FLD AUTO: 7.98 K/UL — SIGNIFICANT CHANGE UP (ref 3.8–10.5)

## 2022-04-12 PROCEDURE — 85025 COMPLETE CBC W/AUTO DIFF WBC: CPT

## 2022-04-12 PROCEDURE — 87086 URINE CULTURE/COLONY COUNT: CPT

## 2022-04-12 PROCEDURE — 84100 ASSAY OF PHOSPHORUS: CPT

## 2022-04-12 PROCEDURE — 85027 COMPLETE CBC AUTOMATED: CPT

## 2022-04-12 PROCEDURE — 36415 COLL VENOUS BLD VENIPUNCTURE: CPT

## 2022-04-12 PROCEDURE — 96376 TX/PRO/DX INJ SAME DRUG ADON: CPT

## 2022-04-12 PROCEDURE — 85730 THROMBOPLASTIN TIME PARTIAL: CPT

## 2022-04-12 PROCEDURE — 74177 CT ABD & PELVIS W/CONTRAST: CPT

## 2022-04-12 PROCEDURE — 96374 THER/PROPH/DIAG INJ IV PUSH: CPT

## 2022-04-12 PROCEDURE — 83735 ASSAY OF MAGNESIUM: CPT

## 2022-04-12 PROCEDURE — 99285 EMERGENCY DEPT VISIT HI MDM: CPT

## 2022-04-12 PROCEDURE — 85610 PROTHROMBIN TIME: CPT

## 2022-04-12 PROCEDURE — 87635 SARS-COV-2 COVID-19 AMP PRB: CPT

## 2022-04-12 PROCEDURE — 80053 COMPREHEN METABOLIC PANEL: CPT

## 2022-04-12 PROCEDURE — 80048 BASIC METABOLIC PNL TOTAL CA: CPT

## 2022-04-12 PROCEDURE — 96375 TX/PRO/DX INJ NEW DRUG ADDON: CPT

## 2022-04-12 PROCEDURE — 81001 URINALYSIS AUTO W/SCOPE: CPT

## 2022-04-12 RX ORDER — ZOLPIDEM TARTRATE 10 MG/1
5 TABLET ORAL ONCE
Refills: 0 | Status: DISCONTINUED | OUTPATIENT
Start: 2022-04-12 | End: 2022-04-12

## 2022-04-12 RX ORDER — POLYETHYLENE GLYCOL 3350 17 G/17G
17 POWDER, FOR SOLUTION ORAL
Qty: 170 | Refills: 0
Start: 2022-04-12 | End: 2022-04-21

## 2022-04-12 RX ORDER — POTASSIUM CHLORIDE 20 MEQ
20 PACKET (EA) ORAL EVERY 4 HOURS
Refills: 0 | Status: DISCONTINUED | OUTPATIENT
Start: 2022-04-12 | End: 2022-04-12

## 2022-04-12 RX ORDER — POTASSIUM CHLORIDE 20 MEQ
20 PACKET (EA) ORAL ONCE
Refills: 0 | Status: COMPLETED | OUTPATIENT
Start: 2022-04-12 | End: 2022-04-12

## 2022-04-12 RX ADMIN — Medication 400 MILLIGRAM(S): at 03:43

## 2022-04-12 RX ADMIN — Medication 600 MILLIGRAM(S): at 05:33

## 2022-04-12 RX ADMIN — Medication 20 MILLIEQUIVALENT(S): at 10:36

## 2022-04-12 RX ADMIN — SIMETHICONE 80 MILLIGRAM(S): 80 TABLET, CHEWABLE ORAL at 03:43

## 2022-04-12 RX ADMIN — Medication 975 MILLIGRAM(S): at 10:36

## 2022-04-12 RX ADMIN — Medication 1000 MILLIGRAM(S): at 04:15

## 2022-04-12 RX ADMIN — SIMETHICONE 80 MILLIGRAM(S): 80 TABLET, CHEWABLE ORAL at 10:36

## 2022-04-12 RX ADMIN — ZOLPIDEM TARTRATE 5 MILLIGRAM(S): 10 TABLET ORAL at 01:53

## 2022-04-12 RX ADMIN — Medication 600 MILLIGRAM(S): at 06:33

## 2022-04-12 NOTE — PROGRESS NOTE ADULT - ASSESSMENT
40yo P2 POD4 s/p scheduled RA TLH, BS, for fibroids (15wk size uterus w/ 1x anterior pedunculated fibroid about 3-4cm in size) c/b AUB and pelvic pain, presents to [facility] ED with severe lower abdominal pain after finishing all her oxycodone pills (6 total) transferred for [facility] for concerning CT imaging for post-op ileus vs. SBO. No N/V. +BS, passing flatus, having BM.    Neuro: Tylenol/Motrin ATC, holding opiods for now, ambien prn, gabapentin 300prn, seroquel 500prn  Cards: euvolemic  Resp: RA  GI: D5/LR @ 125, CLD, simethicone atc, zofran prn, serial abdominal exams. General surgery following, appreciate recs.  GYN: s/p uncomplicated TLH BS 4/8   : voiding  PPX: SCDs, no lovenox needed as patient is ambulating.

## 2022-04-12 NOTE — DISCHARGE NOTE PROVIDER - NSDCMRMEDTOKEN_GEN_ALL_CORE_FT
clonazePAM 0.5 mg oral tablet: 1 tab(s) orally 3 times a day, As Needed  gabapentin 600 mg oral tablet: 1 tab(s) orally 3 times a day  hydrOXYzine hydrochloride 50 mg oral tablet: 1 tab(s) orally once a day   mg oral tablet: 1 tab(s) orally 3 times a day, As Needed   ibuprofen 600 mg oral tablet: 1 tab(s) orally every 6 hours MDD:4 tabs in 1 day  PARoxetine 20 mg oral tablet: 1 tab(s) orally once a day  SEROquel 400 mg oral tablet: 1 tab(s) orally once a day (at bedtime)  Tylenol 325 mg oral tablet: 2 tab(s) orally every 6 hours MDD:8 tabs per day  valACYclovir 1 g oral tablet: 1 tab(s) orally 2 times a day  zolpidem 10 mg oral tablet: 1 tab(s) orally once a day (at bedtime), As Needed   clonazePAM 0.5 mg oral tablet: 1 tab(s) orally 3 times a day, As Needed  gabapentin 600 mg oral tablet: 1 tab(s) orally 3 times a day  hydrOXYzine hydrochloride 50 mg oral tablet: 1 tab(s) orally once a day   mg oral tablet: 1 tab(s) orally 3 times a day, As Needed   ibuprofen 600 mg oral tablet: 1 tab(s) orally every 6 hours MDD:4 tabs in 1 day  MiraLax oral powder for reconstitution: 17 gram(s) orally once a day (at bedtime)   PARoxetine 20 mg oral tablet: 1 tab(s) orally once a day  SEROquel 400 mg oral tablet: 1 tab(s) orally once a day (at bedtime)  Tylenol 325 mg oral tablet: 2 tab(s) orally every 6 hours MDD:8 tabs per day  valACYclovir 1 g oral tablet: 1 tab(s) orally 2 times a day  zolpidem 10 mg oral tablet: 1 tab(s) orally once a day (at bedtime), As Needed
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
## 2022-04-12 NOTE — DISCHARGE NOTE NURSING/CASE MANAGEMENT/SOCIAL WORK - NSDCPEFALRISK_GEN_ALL_CORE
For information on Fall & Injury Prevention, visit: [email or web address] OR  [email or web address] OR  https://www.cdc.gov/steadi/patient.html

## 2022-04-12 NOTE — PROGRESS NOTE ADULT - SUBJECTIVE AND OBJECTIVE BOX
GYN Progress Note    Patient seen at bedside.  Pain controlled overnight, although sometimes would make it hard for her to sleep.  Tolerating clears.  OOB ambulating.  Voiding without difficulty.  +flatus    Denies CP, palpitations, SOB, fever, chills, nausea, vomiting.    Vital Signs Last 24 Hrs  T(C): 36.3 (12 Apr 2022 04:39), Max: 36.8 (11 Apr 2022 16:50)  T(F): 97.4 (12 Apr 2022 04:39), Max: 98.2 (11 Apr 2022 16:50)  HR: 73 (12 Apr 2022 04:39) (73 - 81)  BP: 129/76 (12 Apr 2022 04:39) (119/68 - 138/86)  BP(mean): --  RR: 16 (12 Apr 2022 04:39) (16 - 17)  SpO2: 97% (12 Apr 2022 04:39) (97% - 100%)    Physical Exam:  Gen: No Acute Distress  GI: soft, appropriately tender, mildly distended, +BS, no rebound, no guarding.  Incision C/D/I  Ext: SCDs in place, wnl    I&O's Summary    11 Apr 2022 07:01  -  12 Apr 2022 07:00  --------------------------------------------------------  IN: 3224 mL / OUT: 500 mL / NET: 2724 mL      MEDICATIONS  (STANDING):  acetaminophen     Tablet .. 975 milliGRAM(s) Oral every 6 hours  dextrose 5% + lactated ringers. 1000 milliLiter(s) (125 mL/Hr) IV Continuous <Continuous>  ibuprofen  Tablet. 600 milliGRAM(s) Oral every 6 hours  potassium chloride  20 mEq/100 mL IVPB 20 milliEquivalent(s) IV Intermittent every 4 hours  QUEtiapine 200 milliGRAM(s) Oral at bedtime  QUEtiapine 300 milliGRAM(s) Oral at bedtime  simethicone 80 milliGRAM(s) Chew every 6 hours  zolpidem 5 milliGRAM(s) Oral at bedtime    MEDICATIONS  (PRN):  gabapentin 600 milliGRAM(s) Oral every 24 hours PRN pain  ondansetron Injectable 4 milliGRAM(s) IV Push every 6 hours PRN Nausea and/or Vomiting      LABS:                        10.0   7.98  )-----------( 337      ( 12 Apr 2022 06:47 )             30.5     04-12    141  |  108  |  7   ----------------------------<  98  3.7   |  25  |  0.70    Ca    8.4      12 Apr 2022 06:47  Phos  3.1     04-12  Mg     2.1     04-12

## 2022-04-12 NOTE — DISCHARGE NOTE PROVIDER - NSDCFUADDINST_GEN_ALL_CORE_FT
- Nothing in vagina - no intercourse, tampons, or douching until cleared by your doctor.   - Avoid swimming, tub baths, strenuous activity and heavy lifting until cleared by your doctor.   - Showering is ok.   - Continue oral pain medications as needed for pain.    - Follow up in office in 2 weeks for your postoperative visit.  Call the office to confirm your follow up appointment.   - Call the office sooner if you develop any fever, heavy bleeding, or severe pain.  Go to the closest emergency room for any of these symptoms if you are not able to contact your doctor.      - Please use miralax once a day at bedtime if constipation persists. Stop using miralax if stools are loose/watery.   - Nothing in vagina - no intercourse, tampons, or douching until cleared by your doctor.   - Avoid swimming, tub baths, strenuous activity and heavy lifting until cleared by your doctor.   - Showering is ok.   - Continue oral pain medications as needed for pain.    - Follow up in office in 2 weeks for your postoperative visit.  Call the office to confirm your follow up appointment.   - Call the office sooner if you develop any fever, heavy bleeding, or severe pain.  Go to the closest emergency room for any of these symptoms if you are not able to contact your doctor.

## 2022-04-12 NOTE — DISCHARGE NOTE PROVIDER - HOSPITAL COURSE
40yo P2 POD2 s/p scheduled RA MADI ARCINIEGA, for fibroids (15wk size uterus w/ 1x anterior pedunculated fibroid about 3-4cm in size) c/b AUB and pelvic pain, presents today to [facility] ED with severe abdominal pain after finishing all her oxycodone pills. Given possible SBO/ileus on imaging, patient was admitted, and was made npo. Patient had 2 bowel movements on HD2 and is now tolerating regular diet. General surgery was following. She was advanced to low fiber diet on HD3 which she tolerated well. Patient is passing all post operative milestones and is hemodynamically stable for discharge.

## 2022-04-12 NOTE — PROGRESS NOTE ADULT - ATTENDING COMMENTS
Patient seen and examined, imaging reviewed.  She feels much better. Had BM and flatus last night.  AFVSS  Abd soft, minimal distention, TTP LLQ more than RLQ. Incisions clean    CT demonstrates SBO, but there is dilation of right and transverse colon as well.    Imp:  Resolving partial SBO due to adhesions, POD 4 s/p lap hysterectomy.  Rec: Follow abdominal exam  Clear liquids ok
Had BM, tolerated regular food last night.  Some constipation.  AFVSS  Abd soft, Still a bit tender in LLQ.    labs normal    Imp: Resolving pSBO after ADALI.  Rec:   Ok to give regular diet  Stool softeners.

## 2022-04-12 NOTE — PROGRESS NOTE ADULT - SUBJECTIVE AND OBJECTIVE BOX
SUBJECTIVE: Patient seen and examined bedside.        Vital Signs Last 24 Hrs  T(C): 36.3 (12 Apr 2022 04:39), Max: 36.8 (11 Apr 2022 16:50)  T(F): 97.4 (12 Apr 2022 04:39), Max: 98.2 (11 Apr 2022 16:50)  HR: 73 (12 Apr 2022 04:39) (73 - 81)  BP: 129/76 (12 Apr 2022 04:39) (119/68 - 138/86)  BP(mean): --  RR: 16 (12 Apr 2022 04:39) (16 - 17)  SpO2: 97% (12 Apr 2022 04:39) (97% - 100%)  I&O's Detail    11 Apr 2022 07:01  -  12 Apr 2022 07:00  --------------------------------------------------------  IN:    dextrose 5% + lactated ringers: 2343 mL    IV PiggyBack: 281 mL    IV PiggyBack: 100 mL    Oral Fluid: 500 mL  Total IN: 3224 mL    OUT:    Voided (mL): 500 mL  Total OUT: 500 mL    Total NET: 2724 mL          General: NAD, resting comfortably in bed  C/V: NSR  Pulm: Nonlabored breathing, no respiratory distress  Abd: soft, NT/ND.  Extrem: WWP, no edema, SCDs in place        LABS:                        10.0   7.98  )-----------( 337      ( 12 Apr 2022 06:47 )             30.5     04-12    141  |  108  |  7   ----------------------------<  98  3.7   |  25  |  0.70    Ca    8.4      12 Apr 2022 06:47  Phos  3.1     04-12  Mg     2.1     04-12            RADIOLOGY & ADDITIONAL STUDIES:   SUBJECTIVE: Patient seen and examined bedside. Patient is feeling better than yesterday. Abdominal pain is improved. No nausea or emesis. Passing gas and had 2 bowel movement. She ate pizza and tolerated it.     Vital Signs Last 24 Hrs  T(C): 36.3 (12 Apr 2022 04:39), Max: 36.8 (11 Apr 2022 16:50)  T(F): 97.4 (12 Apr 2022 04:39), Max: 98.2 (11 Apr 2022 16:50)  HR: 73 (12 Apr 2022 04:39) (73 - 81)  BP: 129/76 (12 Apr 2022 04:39) (119/68 - 138/86)  BP(mean): --  RR: 16 (12 Apr 2022 04:39) (16 - 17)  SpO2: 97% (12 Apr 2022 04:39) (97% - 100%)  I&O's Detail    11 Apr 2022 07:01  -  12 Apr 2022 07:00  --------------------------------------------------------  IN:    dextrose 5% + lactated ringers: 2343 mL    IV PiggyBack: 281 mL    IV PiggyBack: 100 mL    Oral Fluid: 500 mL  Total IN: 3224 mL    OUT:    Voided (mL): 500 mL  Total OUT: 500 mL    Total NET: 2724 mL      General: NAD, resting comfortably in bed  C/V: NSR  Pulm: Nonlabored breathing, no respiratory distress  Abd: soft, NT/ND. Mild tenderness in the left upper quadrant  Extrem: WWP, no edema        LABS:                        10.0   7.98  )-----------( 337      ( 12 Apr 2022 06:47 )             30.5     04-12    141  |  108  |  7   ----------------------------<  98  3.7   |  25  |  0.70    Ca    8.4      12 Apr 2022 06:47  Phos  3.1     04-12  Mg     2.1     04-12            RADIOLOGY & ADDITIONAL STUDIES:

## 2022-04-12 NOTE — DISCHARGE NOTE NURSING/CASE MANAGEMENT/SOCIAL WORK - PATIENT PORTAL LINK FT
You can access the FollowMyHealth Patient Portal offered by [facility] by registering at the following website: [email or web address]. By joining [employer]’s FollowMyHealth portal, you will also be able to view your health information using other applications (apps) compatible with our system.

## 2022-04-12 NOTE — DISCHARGE NOTE PROVIDER - CARE PROVIDER_API CALL
Nichole Umana (MD)  Obstetrics and Gynecology  [address], [place] [address]  Phone: [phone]  Fax: [phone]  Follow Up Time:

## 2022-04-12 NOTE — PROGRESS NOTE ADULT - ASSESSMENT
41yF POD4 from a robot assisted laparoscopic hysterectomy for fibroids; general surgery consulted for evaluation for SBO after CT scan demonstrated mildly dilated fluid filled loops of small bowel with possible TP in the left mid abdomen.     Patient is passing gas, no bowel movement yet. Patient refused NG tube decompression. Afebrile, hemodynamically stable. Abdomen still distended and tender to palpation, no peritoneal signs.    Plan  1. If nauseous place NG tube. Because she is still having abdominal pain and no bowel movement yet continue NPO.   2. Recommend frequent serial abdominal exams to assess for progression or changes in patients clinical status.  3. Avoid opioids  4. OOB and AMB, incentive spirometer   5. Continue care by primary team  6. Surgery team 4c to continue to follow.     Plan discussed with chief resident on call and surgery attending. 41yF POD5 from a robot assisted laparoscopic hysterectomy for fibroids; general surgery consulted for evaluation for SBO after CT scan demonstrated mildly dilated fluid filled loops of small bowel with possible TP in the left mid abdomen.     Patient is passing gas and bowel movement. Tolerated solid food. Afebrile, hemodynamically stable. Abdomen improved distention and abdominal tenderness, no peritoneal signs.     Plan  1. Patient can be advance to regular diet  3. Avoid opioids  4. OOB and AMB, incentive spirometer   5. Continue care by primary team  6. Surgery team 4c will sign off no need to follow up outpatient      Plan discussed with chief resident on call and surgery attending.

## 2022-04-12 NOTE — DISCHARGE NOTE PROVIDER - NSDCCPCAREPLAN_GEN_ALL_CORE_FT
PRINCIPAL DISCHARGE DIAGNOSIS  Diagnosis: Partial small bowel obstruction  Assessment and Plan of Treatment:

## 2022-04-15 DIAGNOSIS — K66.8 OTHER SPECIFIED DISORDERS OF PERITONEUM: ICD-10-CM

## 2022-04-15 DIAGNOSIS — R10.2 PELVIC AND PERINEAL PAIN: ICD-10-CM

## 2022-04-15 DIAGNOSIS — M54.30 SCIATICA, UNSPECIFIED SIDE: ICD-10-CM

## 2022-04-15 DIAGNOSIS — J90 PLEURAL EFFUSION, NOT ELSEWHERE CLASSIFIED: ICD-10-CM

## 2022-04-15 DIAGNOSIS — N80.9 ENDOMETRIOSIS, UNSPECIFIED: ICD-10-CM

## 2022-04-15 DIAGNOSIS — R18.8 OTHER ASCITES: ICD-10-CM

## 2022-04-15 DIAGNOSIS — K56.50 INTESTINAL ADHESIONS [BANDS], UNSPECIFIED AS TO PARTIAL VERSUS COMPLETE OBSTRUCTION: ICD-10-CM

## 2022-04-15 DIAGNOSIS — J98.11 ATELECTASIS: ICD-10-CM

## 2022-04-15 DIAGNOSIS — Z90.710 ACQUIRED ABSENCE OF BOTH CERVIX AND UTERUS: ICD-10-CM

## 2022-04-15 DIAGNOSIS — F41.8 OTHER SPECIFIED ANXIETY DISORDERS: ICD-10-CM

## 2022-04-15 DIAGNOSIS — F17.200 NICOTINE DEPENDENCE, UNSPECIFIED, UNCOMPLICATED: ICD-10-CM

## 2022-06-29 NOTE — ED ADULT NURSE NOTE - CAS TRG GEN SKIN COLOR
Reason for Consult / Principal Problem: HCM     Physician Requesting Consult:  Zenon Jimenez MD    Cardiologist: DEVEN Soria        Assessment and Plan      Current Problem List   Principal Problem:    Pyelonephritis  Active Problems:    Hypokalemia    Malignant neoplasm of overlapping sites of cervix [facility])    Hydronephrosis due to obstruction of ureter    Sepsis ([facility] [address] )    Gram-negative bacteremia    Atrial septal hypertrophy    Hypercalcemia    Low TSH level     Assessment/Plan:     1   Hypertrophic Cardiomyopathy:  ? HCM diagnosed on cardiac MRI  Normal EF and LV Systolic function (23%)  ? History does not disclose any discrete symptoms of HCM besides some subjective fatigue  ? Restart home metoprolol at 12 5mg BID, pt complained of fatigue on 25mg BID so we will trial 12 5  ? Repeat inpatient echo showed gradient 12mmhg, LVEF 70%, small LV cavity size, Findings overall consistent with  hypertrophic cardiomyopathy without evidence of dynamic obstruction  ? Monitor telemetry and blood pressure  ? Follow up outpatient with normal cardiologist or HCM specialist       2  Sepsis  ? Continue IV cefepime and monitor WBC and temps  ? Follow outpatient abx recommendations per primary team/urology       3  Hypokalemia  ? Potassium today 4 6 Continue to monitor K+ levels             Subjective      CC: Back Pain     24hr/Overnight Events:     ? Patient has no complaints this morning  ? Slept well  Denies CP, SOB, Palpitations, dizziness, nausea      Telemetry: NSR 80's  No ventricular arrhythmias  No episodes of significant bradycardia     Weight: 69 4 Kg     EK22: NSR  Biatrial enlargement  RSR' or QR pattern in V1 suggests right ventricular conduction delay  Left ventricular hypertrophy with repolarization abnormality     ECHO: 22  Findings overall consistent with  hypertrophic cardiomyopathy, without evidence of dynamic obstruction  Left Ventricle: Left ventricular cavity size is small   Mariya Aguilera thickness is increased  There is severe asymmetric hypertrophy of the mid septal wall (22 mm)  The left ventricular ejection fraction is 70%  Systolic function is vigorous  Global longitudinal strain is normal at -18%, with focal reduction at the mid-septal segments  Wall motion is normal  Diastolic function is mildly abnormal, consistent with grade I (abnormal) relaxation   Left atrial filling pressure is normal  There is no LV dynamic obstruction    Left Atrium: The atrium is moderately dilated (42-48 mL/m2)    Tricuspid Valve: There is trace regurgitation  The right ventricular systolic pressure is normal  The estimated right ventricular systolic pressure is 15 47 mmHg      · Peak Gradient (Valsalva) 12 mmhg     Stress test: No history of ischemic workup      Cardiac Catheterization:  No history of ischemic workup      CT scan: Mild right hydroureteronephrosis secondary to 3 mm distal ureteric calculus   Asymmetric right perinephric and peripelvic fat stranding may be reactive   Correlate clinically for infection       Labs: Klebsiella Bacteremia, K+ 4 8 from 4 1 (2 7 on admit), WBC 9 4 from 10, HGB 10 6 from 11, creat   76 from   80           Family History: non-contributory  Historical Information   Past Medical History:   Diagnosis Date    Abnormal Pap smear of cervix     Acute hip pain     Cancer (HCC)     cervix    Hypokalemia     Hypokalemia 4/26/2019    Low back pain     Lymphadenopathy     Pelvic pain      Past Surgical History:   Procedure Laterality Date    FL RETROGRADE PYELOGRAM  6/25/2022    IN BRONCHOSCOPY NEEDLE BX TRACHEA MAIN STEM&/BRON N/A 2/26/2020    Procedure: ENDOBRONCHIAL ULTRASOUND (EBUS);   Surgeon: Sydnie Echavarria MD;  [facility];  Service: Thoracic    IN [address] N/A 2/26/2020    Procedure: Jessa Kimball;  Surgeon: Sydnie Echavarria MD;  [facility];  Service: Thoracic    IN CYSTOURETHROSCOPY,URETER CATHETER Right 6/25/2022 Procedure: CYSTOSCOPY RETROGRADE PYELOGRAM WITH INSERTION STENT URETERAL;  Surgeon: Lorne Chang MD;  [facility];  Service: Urology    MI DILATION OF VAGINA W ANESTH N/A 2019    Procedure: EXAM UNDER ANESTHESIA (EUA);   Surgeon: Gerald Kessler MD;  [facility];  Service: Gynecology Oncology    MI INSERT VAGINAL RADIATION DEVICE N/A 2019    Procedure: PIERRE SLEEVE PLACEMENT;  Surgeon: Gerald Kessler MD;  [facility];  Service: Gynecology Oncology    TONSILLECTOMY      US GUIDANCE  2019     Social History   Social History     Substance and Sexual Activity   Alcohol Use Not Currently     Social History     Substance and Sexual Activity   Drug Use Never     Social History     Tobacco Use   Smoking Status Former Smoker    Packs/day: 0 50    Years: 1 00    Pack years: 0 50    Types: Cigarettes    Quit date: 2020    Years since quittin 3   Smokeless Tobacco Never Used     Family History:   Family History   Problem Relation Age of Onset    Uterine cancer Maternal Grandmother     Heart disease Mother     Canavan disease Father     Cancer Father        Review of Systems:  Review of Systems        Scheduled Meds:  Current Facility-Administered Medications   Medication Dose Route Frequency Provider Last Rate    acetaminophen  650 mg Oral Q6H PRN Delana Aase, MD      aluminum-magnesium hydroxide-simethicone  30 mL Oral Q4H PRN DEVEN Simmons      amLODIPine  5 mg Oral Daily DEVEN Simmons      ciprofloxacin  500 mg Oral Q12H [facility] Patricia Real PA-C      enoxaparin  40 mg Subcutaneous Daily Anna Marie Vargas MD      fish oil  1,000 mg Oral Daily Delana Aase, MD      hydrALAZINE  10 mg Intravenous Q6H PRN DEVEN Simmons      metoprolol tartrate  12 5 mg Oral Q12H [facility] Manuela Barnett DO      multivitamin-minerals  1 tablet Oral Daily Delana Aase, MD      oxyCODONE  2 5 mg Oral Q4H PRN Delana Aase, MD  oxyCODONE  5 mg Oral Q4H PRN Dorothy Tubbs MD      pantoprazole  40 mg Oral BID AC DEVEN Kang      phenazopyridine  100 mg Oral TID With Meals DEVEN Kang      polyethylene glycol  17 g Oral Daily DEVEN Kang      tamsulosin  0 4 mg Oral Daily With Terri Solano MD       Continuous Infusions:   PRN Meds:   acetaminophen    aluminum-magnesium hydroxide-simethicone    hydrALAZINE    oxyCODONE    oxyCODONE  all current active meds have been reviewed    Allergies   Allergen Reactions    Hydrochlorothiazide Arthralgia    Latex Hives    Other      Tomatoes   Vomiting and diarhhea       Objective   Vitals: Temp (24hrs), Av 5 °F (36 9 °C), Min:98 3 °F (36 8 °C), Max:98 7 °F (37 1 °C)  Current: Temperature: 98 3 °F (36 8 °C)  Patient Vitals for the past 24 hrs:   BP Temp Pulse Resp SpO2   22 0650 142/88 98 3 °F (36 8 °C) 67 20 95 %   22 0309 143/87 98 7 °F (37 1 °C) 72 18 95 %   22 2218 142/89 98 7 °F (37 1 °C) 85 20 97 %   22 2100 -- -- -- -- 97 %   22 1528 148/89 98 4 °F (36 9 °C) 80 18 95 %    Body mass index is 28 91 kg/m²    Orthostatic Blood Pressures    Flowsheet Row Most Recent Value   Blood Pressure 142/88 filed at 2022 0650              Invasive Devices  Report    Peripheral Intravenous Line  Duration           Peripheral IV 22 Left;Ventral (anterior) Forearm 1 day                Physical Exam:  Physical Exam        Lab Results:   Results from last 7 days   Lab Units 22  0459 22  0448 22  0635 22  1012 22  1949   WBC Thousand/uL 9 38 10 04 11 16* 5 47 6 25   HEMOGLOBIN g/dL 10 6* 11 0* 12 0 10 0* 12 1   HEMATOCRIT % 33 0* 33 2* 36 9 29 4* 35 5   PLATELETS Thousands/uL 277 215 183 107* 130*   NEUTROS PCT %  --   --   --  81* 83*   MONOS PCT %  --   --   --  9 9   MONO PCT %  --   --  1*  --   --       Results from last 7 days   Lab Units 22  0459 22  0447 22  0448 06/26/22  0635 06/25/22  1834 06/25/22  1025 06/24/22 1949   SODIUM mmol/L 140 139 144 143  --  142 135*   POTASSIUM mmol/L 4 6 4 1 3 1* 3 9 3 4* 2 5* 2 7*   CHLORIDE mmol/L 105 107 110* 110*  --  110* 97*   CO2 mmol/L 31 29 26 29  --  28 27   BUN mg/dL 15 17 26* 26*  --  18 29*   CREATININE mg/dL 0 76 0 83 0 88 1 18  --  1 09 1 28   CALCIUM mg/dL 9 8 9 8 10 4* 11 5*  --  9 8 10 0   ALK PHOS U/L  --  64 78  --   --  62 72   ALT U/L  --  20 28  --   --  21 24   AST U/L  --  12 17  --   --  11 28   MAGNESIUM mg/dL  --   --   --  2 3  --   --  1 9   INR   --   --   --   --   --   --  1 09   PTT seconds  --   --   --   --   --   --  32   EGFR ml/min/1 73sq m 95 85 79 55  --  61 50     Results from last 7 days   Lab Units 06/24/22 1949   INR  1 09   PTT seconds 32     Results from last 7 days   Lab Units 06/24/22  2332   LACTIC ACID mmol/L 0 4*         No results found for: PHART, RAZ9JUZ, PO2ART, XEM2UGB, Q8XMNLQF, BEART, SOURCE  No components found for: HIV1X2  No results found for: HAV, HEPAIGM, HEPBIGM, HEPBCAB, HBEAG, HEPCAB  No results found for: SPEP, UPEP No results found for: HGBA1C  No results found for: CHOL No results found for: HDL No results found for: LDLCALC No results found for: TRIG  No components found for: PROCAL          Imaging: I have personally reviewed pertinent reports  Normal for race

## 2022-08-17 NOTE — ED PROCEDURE NOTE - CPROC ED POST RADIOGRAPHY1
extremity correctly positioned, splinted Clofazimine Pregnancy And Lactation Text: This medication is Pregnancy Category C and isn't considered safe during pregnancy. It is excreted in breast milk.

## 2022-11-01 ENCOUNTER — APPOINTMENT (OUTPATIENT)
Dept: ORTHOPEDIC SURGERY | Facility: CLINIC | Age: 42
End: 2022-11-01

## 2022-12-09 NOTE — ED ADULT NURSE NOTE - CHPI ED NUR SYMPTOMS NEG
Kelly Daily DO no tingling/no bruising/no difficulty bearing weight/no stiffness/no weakness/no abrasion/no fever/no back pain/no deformity/no numbness

## 2023-03-16 NOTE — ED PROVIDER NOTE - CROS ED NEURO ALL NEG
- - - Quality 431: Preventive Care And Screening: Unhealthy Alcohol Use - Screening: Patient not identified as an unhealthy alcohol user when screened for unhealthy alcohol use using a systematic screening method Detail Level: Detailed Quality 130: Documentation Of Current Medications In The Medical Record: Current Medications Documented Quality 110: Preventive Care And Screening: Influenza Immunization: Influenza immunization was not ordered or administered, reason not given Quality 226: Preventive Care And Screening: Tobacco Use: Screening And Cessation Intervention: Patient screened for tobacco use and is an ex/non-smoker

## 2023-04-26 NOTE — ED PROVIDER NOTE - CONSTITUTIONAL, MLM
Patient returned the clinic's phone call and would like her CT arthrogram order be sent to [facility].  I stated understanding and will fax the order to [facility].  Patient was notified to please have the images pushed to our system after she has it completed.  Patient verbalized understanding.  No further questions at this time.     Orders have been faxed.    normal... Well appearing, awake, alert, oriented to person, place, time/situation and in no apparent distress.

## 2023-06-26 ENCOUNTER — EMERGENCY (EMERGENCY)
Facility: HOSPITAL | Age: 43
LOS: 1 days | Discharge: HOME | End: 2023-06-26
Attending: EMERGENCY MEDICINE | Admitting: EMERGENCY MEDICINE
Payer: MEDICAID

## 2023-06-26 VITALS
WEIGHT: 195.11 LBS | SYSTOLIC BLOOD PRESSURE: 139 MMHG | TEMPERATURE: 98 F | HEIGHT: 66 IN | OXYGEN SATURATION: 99 % | RESPIRATION RATE: 18 BRPM | DIASTOLIC BLOOD PRESSURE: 93 MMHG | HEART RATE: 84 BPM

## 2023-06-26 DIAGNOSIS — Z98.890 OTHER SPECIFIED POSTPROCEDURAL STATES: Chronic | ICD-10-CM

## 2023-06-26 DIAGNOSIS — Z87.81 PERSONAL HISTORY OF (HEALED) TRAUMATIC FRACTURE: Chronic | ICD-10-CM

## 2023-06-26 DIAGNOSIS — Z90.710 ACQUIRED ABSENCE OF BOTH CERVIX AND UTERUS: Chronic | ICD-10-CM

## 2023-06-26 PROCEDURE — 99284 EMERGENCY DEPT VISIT MOD MDM: CPT

## 2023-06-26 PROCEDURE — 73130 X-RAY EXAM OF HAND: CPT | Mod: 26,LT

## 2023-06-26 RX ORDER — CEPHALEXIN 500 MG
1 CAPSULE ORAL
Qty: 28 | Refills: 0
Start: 2023-06-26 | End: 2023-07-02

## 2023-06-26 RX ORDER — BACITRACIN ZINC 500 UNIT/G
1 OINTMENT IN PACKET (EA) TOPICAL ONCE
Refills: 0 | Status: COMPLETED | OUTPATIENT
Start: 2023-06-26 | End: 2023-06-26

## 2023-06-26 RX ORDER — KETOROLAC TROMETHAMINE 30 MG/ML
15 SYRINGE (ML) INJECTION ONCE
Refills: 0 | Status: DISCONTINUED | OUTPATIENT
Start: 2023-06-26 | End: 2023-06-26

## 2023-06-26 RX ORDER — MUPIROCIN 20 MG/G
1 OINTMENT TOPICAL
Qty: 1 | Refills: 1
Start: 2023-06-26 | End: 2023-07-09

## 2023-06-26 RX ORDER — CEPHALEXIN 500 MG
500 CAPSULE ORAL ONCE
Refills: 0 | Status: COMPLETED | OUTPATIENT
Start: 2023-06-26 | End: 2023-06-26

## 2023-06-26 RX ADMIN — Medication 15 MILLIGRAM(S): at 18:43

## 2023-06-26 RX ADMIN — Medication 500 MILLIGRAM(S): at 18:43

## 2023-06-26 RX ADMIN — Medication 1 TABLET(S): at 18:43

## 2023-06-26 RX ADMIN — Medication 1 APPLICATION(S): at 18:43

## 2023-06-26 NOTE — ED PROVIDER NOTE - OBJECTIVE STATEMENT
redness around laceration which was sutured a few days ago at [place], no fever or chills, no red streaks to wrist/arm

## 2023-06-26 NOTE — ED ADULT NURSE NOTE - OBJECTIVE STATEMENT
Pt came in c/o of redness to the left hand where she received sutures at [place] for a lac from a cut on Saturday. Denies fever, chills, or drainage. A&Ox3 speaking in full sentences

## 2023-06-26 NOTE — ED PROVIDER NOTE - PATIENT PORTAL LINK FT
You can access the FollowMyHealth Patient Portal offered by [facility] by registering at the following website: [email or web address]. By joining [employer]’s FollowMyHealth portal, you will also be able to view your health information using other applications (apps) compatible with our system.

## 2023-06-26 NOTE — ED PROVIDER NOTE - CLINICAL SUMMARY MEDICAL DECISION MAKING FREE TEXT BOX
redness around laceration which was sutured a few days ago at [place], no fever or chills, no red streaks to wrist/arm    xr neg, Bactrim and Keflex and Bactroban and w/c 2 days

## 2023-06-26 NOTE — ED ADULT NURSE NOTE - NSFALLRISK_ED_ALL_ED
No Estlander Flap (Upper To Lower Lip) Text: The defect of the lower lip was assessed and measured.  Given the location and size of the defect, an Estlander flap was deemed most appropriate.  Using a sterile surgical marker, an appropriate Estlander flap was measured and drawn on the upper lip. Local anesthesia was then infiltrated. A scalpel was then used to incise the lateral aspect of the flap, through skin, muscle and mucosa, leaving the flap pedicled medially.  The flap was then rotated and positioned to fill the lower lip defect.  The flap was then sutured into place with a three layer technique, closing the orbicularis oris muscle layer with subcutaneous buried sutures, followed by a mucosal layer and an epidermal layer.

## 2023-06-28 DIAGNOSIS — L03.114 CELLULITIS OF LEFT UPPER LIMB: ICD-10-CM

## 2023-06-28 DIAGNOSIS — Z90.710 ACQUIRED ABSENCE OF BOTH CERVIX AND UTERUS: ICD-10-CM

## 2023-06-28 DIAGNOSIS — Z98.890 OTHER SPECIFIED POSTPROCEDURAL STATES: ICD-10-CM

## 2023-06-28 DIAGNOSIS — Z87.39 PERSONAL HISTORY OF OTHER DISEASES OF THE MUSCULOSKELETAL SYSTEM AND CONNECTIVE TISSUE: ICD-10-CM

## 2023-06-28 DIAGNOSIS — Z86.2 PERSONAL HISTORY OF DISEASES OF THE BLOOD AND BLOOD-FORMING ORGANS AND CERTAIN DISORDERS INVOLVING THE IMMUNE MECHANISM: ICD-10-CM

## 2023-06-28 DIAGNOSIS — F41.8 OTHER SPECIFIED ANXIETY DISORDERS: ICD-10-CM

## 2023-06-28 DIAGNOSIS — Z87.42 PERSONAL HISTORY OF OTHER DISEASES OF THE FEMALE GENITAL TRACT: ICD-10-CM

## 2024-01-02 ENCOUNTER — EMERGENCY (EMERGENCY)
Facility: HOSPITAL | Age: 44
LOS: 1 days | Discharge: HOME | End: 2024-01-02
Attending: EMERGENCY MEDICINE | Admitting: EMERGENCY MEDICINE
Payer: MEDICAID

## 2024-01-02 VITALS
WEIGHT: 194.01 LBS | SYSTOLIC BLOOD PRESSURE: 129 MMHG | TEMPERATURE: 98 F | DIASTOLIC BLOOD PRESSURE: 86 MMHG | HEIGHT: 66 IN | OXYGEN SATURATION: 97 % | RESPIRATION RATE: 16 BRPM | HEART RATE: 74 BPM

## 2024-01-02 VITALS
RESPIRATION RATE: 16 BRPM | SYSTOLIC BLOOD PRESSURE: 127 MMHG | HEART RATE: 64 BPM | OXYGEN SATURATION: 95 % | DIASTOLIC BLOOD PRESSURE: 77 MMHG | TEMPERATURE: 98 F

## 2024-01-02 DIAGNOSIS — R51.9 HEADACHE, UNSPECIFIED: ICD-10-CM

## 2024-01-02 DIAGNOSIS — Z87.81 PERSONAL HISTORY OF (HEALED) TRAUMATIC FRACTURE: Chronic | ICD-10-CM

## 2024-01-02 DIAGNOSIS — Z90.710 ACQUIRED ABSENCE OF BOTH CERVIX AND UTERUS: Chronic | ICD-10-CM

## 2024-01-02 DIAGNOSIS — Z98.890 OTHER SPECIFIED POSTPROCEDURAL STATES: Chronic | ICD-10-CM

## 2024-01-02 LAB
ALBUMIN SERPL ELPH-MCNC: 3.6 G/DL — SIGNIFICANT CHANGE UP (ref 3.4–5)
ALBUMIN SERPL ELPH-MCNC: 3.6 G/DL — SIGNIFICANT CHANGE UP (ref 3.4–5)
ALP SERPL-CCNC: 119 U/L — SIGNIFICANT CHANGE UP (ref 40–120)
ALP SERPL-CCNC: 119 U/L — SIGNIFICANT CHANGE UP (ref 40–120)
ALT FLD-CCNC: 20 U/L — SIGNIFICANT CHANGE UP (ref 12–42)
ALT FLD-CCNC: 20 U/L — SIGNIFICANT CHANGE UP (ref 12–42)
ANION GAP SERPL CALC-SCNC: 9 MMOL/L — SIGNIFICANT CHANGE UP (ref 9–16)
ANION GAP SERPL CALC-SCNC: 9 MMOL/L — SIGNIFICANT CHANGE UP (ref 9–16)
APTT BLD: 33.6 SEC — SIGNIFICANT CHANGE UP (ref 24.5–35.6)
APTT BLD: 33.6 SEC — SIGNIFICANT CHANGE UP (ref 24.5–35.6)
AST SERPL-CCNC: 18 U/L — SIGNIFICANT CHANGE UP (ref 15–37)
AST SERPL-CCNC: 18 U/L — SIGNIFICANT CHANGE UP (ref 15–37)
BILIRUB SERPL-MCNC: 0.2 MG/DL — SIGNIFICANT CHANGE UP (ref 0.2–1.2)
BILIRUB SERPL-MCNC: 0.2 MG/DL — SIGNIFICANT CHANGE UP (ref 0.2–1.2)
BUN SERPL-MCNC: 12 MG/DL — SIGNIFICANT CHANGE UP (ref 7–23)
BUN SERPL-MCNC: 12 MG/DL — SIGNIFICANT CHANGE UP (ref 7–23)
CALCIUM SERPL-MCNC: 8.9 MG/DL — SIGNIFICANT CHANGE UP (ref 8.5–10.5)
CALCIUM SERPL-MCNC: 8.9 MG/DL — SIGNIFICANT CHANGE UP (ref 8.5–10.5)
CHLORIDE SERPL-SCNC: 104 MMOL/L — SIGNIFICANT CHANGE UP (ref 96–108)
CHLORIDE SERPL-SCNC: 104 MMOL/L — SIGNIFICANT CHANGE UP (ref 96–108)
CO2 SERPL-SCNC: 23 MMOL/L — SIGNIFICANT CHANGE UP (ref 22–31)
CO2 SERPL-SCNC: 23 MMOL/L — SIGNIFICANT CHANGE UP (ref 22–31)
CREAT SERPL-MCNC: 1.02 MG/DL — SIGNIFICANT CHANGE UP (ref 0.5–1.3)
CREAT SERPL-MCNC: 1.02 MG/DL — SIGNIFICANT CHANGE UP (ref 0.5–1.3)
EGFR: 70 ML/MIN/1.73M2 — SIGNIFICANT CHANGE UP
EGFR: 70 ML/MIN/1.73M2 — SIGNIFICANT CHANGE UP
ETHANOL SERPL-MCNC: <3 MG/DL — SIGNIFICANT CHANGE UP
ETHANOL SERPL-MCNC: <3 MG/DL — SIGNIFICANT CHANGE UP
GLUCOSE SERPL-MCNC: 106 MG/DL — HIGH (ref 70–99)
GLUCOSE SERPL-MCNC: 106 MG/DL — HIGH (ref 70–99)
HCG SERPL-ACNC: 1 MIU/ML — SIGNIFICANT CHANGE UP
HCG SERPL-ACNC: 1 MIU/ML — SIGNIFICANT CHANGE UP
HCT VFR BLD CALC: 42.1 % — SIGNIFICANT CHANGE UP (ref 34.5–45)
HCT VFR BLD CALC: 42.1 % — SIGNIFICANT CHANGE UP (ref 34.5–45)
HGB BLD-MCNC: 13.7 G/DL — SIGNIFICANT CHANGE UP (ref 11.5–15.5)
HGB BLD-MCNC: 13.7 G/DL — SIGNIFICANT CHANGE UP (ref 11.5–15.5)
INR BLD: 0.99 — SIGNIFICANT CHANGE UP (ref 0.85–1.18)
INR BLD: 0.99 — SIGNIFICANT CHANGE UP (ref 0.85–1.18)
MCHC RBC-ENTMCNC: 28.9 PG — SIGNIFICANT CHANGE UP (ref 27–34)
MCHC RBC-ENTMCNC: 28.9 PG — SIGNIFICANT CHANGE UP (ref 27–34)
MCHC RBC-ENTMCNC: 32.5 GM/DL — SIGNIFICANT CHANGE UP (ref 32–36)
MCHC RBC-ENTMCNC: 32.5 GM/DL — SIGNIFICANT CHANGE UP (ref 32–36)
MCV RBC AUTO: 88.8 FL — SIGNIFICANT CHANGE UP (ref 80–100)
MCV RBC AUTO: 88.8 FL — SIGNIFICANT CHANGE UP (ref 80–100)
NRBC # BLD: 0 /100 WBCS — SIGNIFICANT CHANGE UP (ref 0–0)
NRBC # BLD: 0 /100 WBCS — SIGNIFICANT CHANGE UP (ref 0–0)
PLATELET # BLD AUTO: 305 K/UL — SIGNIFICANT CHANGE UP (ref 150–400)
PLATELET # BLD AUTO: 305 K/UL — SIGNIFICANT CHANGE UP (ref 150–400)
POTASSIUM SERPL-MCNC: 4.1 MMOL/L — SIGNIFICANT CHANGE UP (ref 3.5–5.3)
POTASSIUM SERPL-MCNC: 4.1 MMOL/L — SIGNIFICANT CHANGE UP (ref 3.5–5.3)
POTASSIUM SERPL-SCNC: 4.1 MMOL/L — SIGNIFICANT CHANGE UP (ref 3.5–5.3)
POTASSIUM SERPL-SCNC: 4.1 MMOL/L — SIGNIFICANT CHANGE UP (ref 3.5–5.3)
PROT SERPL-MCNC: 7.1 G/DL — SIGNIFICANT CHANGE UP (ref 6.4–8.2)
PROT SERPL-MCNC: 7.1 G/DL — SIGNIFICANT CHANGE UP (ref 6.4–8.2)
PROTHROM AB SERPL-ACNC: 10.9 SEC — SIGNIFICANT CHANGE UP (ref 9.5–13)
PROTHROM AB SERPL-ACNC: 10.9 SEC — SIGNIFICANT CHANGE UP (ref 9.5–13)
RBC # BLD: 4.74 M/UL — SIGNIFICANT CHANGE UP (ref 3.8–5.2)
RBC # BLD: 4.74 M/UL — SIGNIFICANT CHANGE UP (ref 3.8–5.2)
RBC # FLD: 13.9 % — SIGNIFICANT CHANGE UP (ref 10.3–14.5)
RBC # FLD: 13.9 % — SIGNIFICANT CHANGE UP (ref 10.3–14.5)
SODIUM SERPL-SCNC: 136 MMOL/L — SIGNIFICANT CHANGE UP (ref 132–145)
SODIUM SERPL-SCNC: 136 MMOL/L — SIGNIFICANT CHANGE UP (ref 132–145)
TROPONIN I, HIGH SENSITIVITY RESULT: 6.6 NG/L — SIGNIFICANT CHANGE UP
TROPONIN I, HIGH SENSITIVITY RESULT: 6.6 NG/L — SIGNIFICANT CHANGE UP
WBC # BLD: 9.46 K/UL — SIGNIFICANT CHANGE UP (ref 3.8–10.5)
WBC # BLD: 9.46 K/UL — SIGNIFICANT CHANGE UP (ref 3.8–10.5)
WBC # FLD AUTO: 9.46 K/UL — SIGNIFICANT CHANGE UP (ref 3.8–10.5)
WBC # FLD AUTO: 9.46 K/UL — SIGNIFICANT CHANGE UP (ref 3.8–10.5)

## 2024-01-02 PROCEDURE — 99285 EMERGENCY DEPT VISIT HI MDM: CPT

## 2024-01-02 PROCEDURE — 70450 CT HEAD/BRAIN W/O DYE: CPT | Mod: 26,59

## 2024-01-02 PROCEDURE — 70498 CT ANGIOGRAPHY NECK: CPT | Mod: 26

## 2024-01-02 PROCEDURE — 70496 CT ANGIOGRAPHY HEAD: CPT | Mod: 26

## 2024-01-02 RX ORDER — DIPHENHYDRAMINE HCL 50 MG
25 CAPSULE ORAL ONCE
Refills: 0 | Status: COMPLETED | OUTPATIENT
Start: 2024-01-02 | End: 2024-01-02

## 2024-01-02 RX ORDER — CYCLOBENZAPRINE HYDROCHLORIDE 10 MG/1
1 TABLET, FILM COATED ORAL
Qty: 9 | Refills: 0
Start: 2024-01-02 | End: 2024-01-04

## 2024-01-02 RX ORDER — METOCLOPRAMIDE HCL 10 MG
10 TABLET ORAL ONCE
Refills: 0 | Status: COMPLETED | OUTPATIENT
Start: 2024-01-02 | End: 2024-01-02

## 2024-01-02 RX ORDER — SODIUM CHLORIDE 9 MG/ML
1000 INJECTION INTRAMUSCULAR; INTRAVENOUS; SUBCUTANEOUS ONCE
Refills: 0 | Status: COMPLETED | OUTPATIENT
Start: 2024-01-02 | End: 2024-01-02

## 2024-01-02 RX ORDER — KETOROLAC TROMETHAMINE 30 MG/ML
15 SYRINGE (ML) INJECTION ONCE
Refills: 0 | Status: DISCONTINUED | OUTPATIENT
Start: 2024-01-02 | End: 2024-01-02

## 2024-01-02 RX ORDER — ACETAMINOPHEN 500 MG
650 TABLET ORAL ONCE
Refills: 0 | Status: COMPLETED | OUTPATIENT
Start: 2024-01-02 | End: 2024-01-02

## 2024-01-02 RX ADMIN — Medication 10 MILLIGRAM(S): at 17:07

## 2024-01-02 RX ADMIN — SODIUM CHLORIDE 1000 MILLILITER(S): 9 INJECTION INTRAMUSCULAR; INTRAVENOUS; SUBCUTANEOUS at 14:49

## 2024-01-02 RX ADMIN — Medication 650 MILLIGRAM(S): at 14:49

## 2024-01-02 RX ADMIN — Medication 25 MILLIGRAM(S): at 17:07

## 2024-01-02 RX ADMIN — Medication 15 MILLIGRAM(S): at 17:07

## 2024-01-02 NOTE — ED ADULT TRIAGE NOTE - CHIEF COMPLAINT QUOTE
"my entire left side is painful from my scalp to my left am' started left side of neck "for months" was given trazadone by chuy "but im too scared to take coz im taking pep" BEFAST negative

## 2024-01-02 NOTE — ED PROVIDER NOTE - NSFOLLOWUPINSTRUCTIONS_ED_ALL_ED_FT
Please follow up with your primary care doctor this week.  Return if you have any concerns.  Your CAT scan and angiogram results were normal are attached for her to read. Please follow up with your primary care doctor this week.  Return if you have any concerns.  Your CAT scan and angiogram results were normal are attached for her to read.  Ibuprofen 600mg every 8 hours as needed for pain.  You may add cyclobenzaprine (flexeril) 5mg every 8 hours if the ibuprofen is not enough.

## 2024-01-02 NOTE — ED ADULT NURSE NOTE - NSFALLUNIVINTERV_ED_ALL_ED
Bed/Stretcher in lowest position, wheels locked, appropriate side rails in place/Call bell, personal items and telephone in reach/Instruct patient to call for assistance before getting out of bed/chair/stretcher/Non-slip footwear applied when patient is off stretcher/[place] to call system/Physically safe environment - no spills, clutter or unnecessary equipment/Purposeful proactive rounding/Room/bathroom lighting operational, light cord in reach Bed/Stretcher in lowest position, wheels locked, appropriate side rails in place/Call bell, personal items and telephone in reach/Instruct patient to call for assistance before getting out of bed/chair/stretcher/Non-slip footwear applied when patient is off stretcher/[place] to call system/Physically safe environment - no spills, clutter or unnecessary equipment/Purposeful proactive rounding/Room/bathroom lighting operational, light cord in reach

## 2024-01-02 NOTE — ED PROVIDER NOTE - CLINICAL SUMMARY MEDICAL DECISION MAKING FREE TEXT BOX
Patient with left sided headache today rad to left neck.  CT and CTA head and neck neg as per attending radiologist.  Improved with IV metoclopramide and Toradol and diphenhyd and saline.  Labs reviewed.  No fever.  Follow up with PMD.  Pt reports being under much stress with 24 year old child.

## 2024-03-28 NOTE — ED PROVIDER NOTE - NS ED MD DISPO DISCHARGE CCDA
Render Risk Assessment In Note?: no
Additional Notes: Patient stated that he was charged a huge amount by his insurance from his last appointment for the wart treatment. Per Selena patient can give our office a call to let us know if that recurs and we will look into it.
Detail Level: Simple
Patient/Caregiver provided printed discharge information.

## 2024-04-26 ENCOUNTER — EMERGENCY (EMERGENCY)
Facility: HOSPITAL | Age: 44
LOS: 1 days | Discharge: LEFT BEFORE DISCHARGE | End: 2024-04-26
Admitting: EMERGENCY MEDICINE
Payer: MEDICAID

## 2024-04-26 VITALS
SYSTOLIC BLOOD PRESSURE: 133 MMHG | DIASTOLIC BLOOD PRESSURE: 79 MMHG | HEART RATE: 78 BPM | TEMPERATURE: 99 F | RESPIRATION RATE: 17 BRPM | OXYGEN SATURATION: 99 %

## 2024-04-26 VITALS — HEART RATE: 62 BPM

## 2024-04-26 DIAGNOSIS — Z98.890 OTHER SPECIFIED POSTPROCEDURAL STATES: Chronic | ICD-10-CM

## 2024-04-26 DIAGNOSIS — Z90.710 ACQUIRED ABSENCE OF BOTH CERVIX AND UTERUS: Chronic | ICD-10-CM

## 2024-04-26 DIAGNOSIS — Z87.81 PERSONAL HISTORY OF (HEALED) TRAUMATIC FRACTURE: Chronic | ICD-10-CM

## 2024-04-26 PROCEDURE — L9991: CPT

## 2024-04-30 DIAGNOSIS — R07.9 CHEST PAIN, UNSPECIFIED: ICD-10-CM

## 2024-04-30 DIAGNOSIS — Z53.21 PROCEDURE AND TREATMENT NOT CARRIED OUT DUE TO PATIENT LEAVING PRIOR TO BEING SEEN BY HEALTH CARE PROVIDER: ICD-10-CM

## 2024-07-05 NOTE — ED ADULT NURSE NOTE - NS ED NOTE  TALK SOMEONE YN
Patient called the RX Refill Line. Message is being forwarded to the office.     Patient is requesting lenalidomide (REVLIMID) 5 MG CAPS   No longer in active medication list.   Patient is stating that she is still on this medication, she would like a phone call back.    Please contact patient at  [phone]      No

## 2024-07-15 NOTE — ED ADULT NURSE NOTE - NEURO ASSESSMENT
Routing to PCP      Please place gyn referral for pt based off last US 4/4/24  There is a potential that endometriosis could be causing your symptoms. having pelvic pain and pain with intercourse     SHAILESH Perez    Triage Nurse  [facility]       - - -

## 2024-07-27 ENCOUNTER — EMERGENCY (EMERGENCY)
Facility: HOSPITAL | Age: 44
LOS: 1 days | Discharge: HOME | End: 2024-07-27
Admitting: EMERGENCY MEDICINE
Payer: MEDICAID

## 2024-07-27 VITALS
SYSTOLIC BLOOD PRESSURE: 136 MMHG | RESPIRATION RATE: 16 BRPM | OXYGEN SATURATION: 97 % | TEMPERATURE: 98 F | DIASTOLIC BLOOD PRESSURE: 80 MMHG | HEART RATE: 86 BPM

## 2024-07-27 VITALS
TEMPERATURE: 98 F | RESPIRATION RATE: 16 BRPM | DIASTOLIC BLOOD PRESSURE: 80 MMHG | HEART RATE: 91 BPM | OXYGEN SATURATION: 95 % | SYSTOLIC BLOOD PRESSURE: 132 MMHG

## 2024-07-27 DIAGNOSIS — Z20.822 CONTACT WITH AND (SUSPECTED) EXPOSURE TO COVID-19: ICD-10-CM

## 2024-07-27 DIAGNOSIS — R09.81 NASAL CONGESTION: ICD-10-CM

## 2024-07-27 DIAGNOSIS — N12 TUBULO-INTERSTITIAL NEPHRITIS, NOT SPECIFIED AS ACUTE OR CHRONIC: ICD-10-CM

## 2024-07-27 DIAGNOSIS — Z98.890 OTHER SPECIFIED POSTPROCEDURAL STATES: Chronic | ICD-10-CM

## 2024-07-27 DIAGNOSIS — M54.50 LOW BACK PAIN, UNSPECIFIED: ICD-10-CM

## 2024-07-27 DIAGNOSIS — Z90.710 ACQUIRED ABSENCE OF BOTH CERVIX AND UTERUS: Chronic | ICD-10-CM

## 2024-07-27 DIAGNOSIS — Z87.81 PERSONAL HISTORY OF (HEALED) TRAUMATIC FRACTURE: Chronic | ICD-10-CM

## 2024-07-27 LAB
ALBUMIN SERPL ELPH-MCNC: 3.2 G/DL — LOW (ref 3.4–5)
ALP SERPL-CCNC: 132 U/L — HIGH (ref 40–120)
ALT FLD-CCNC: 33 U/L — SIGNIFICANT CHANGE UP (ref 12–42)
ANION GAP SERPL CALC-SCNC: 10 MMOL/L — SIGNIFICANT CHANGE UP (ref 9–16)
APPEARANCE UR: CLEAR — SIGNIFICANT CHANGE UP
AST SERPL-CCNC: 14 U/L — LOW (ref 15–37)
BACTERIA # UR AUTO: ABNORMAL /HPF
BASOPHILS # BLD AUTO: 0 K/UL — SIGNIFICANT CHANGE UP (ref 0–0.2)
BASOPHILS NFR BLD AUTO: 0 % — SIGNIFICANT CHANGE UP (ref 0–2)
BILIRUB SERPL-MCNC: 0.7 MG/DL — SIGNIFICANT CHANGE UP (ref 0.2–1.2)
BILIRUB UR-MCNC: NEGATIVE — SIGNIFICANT CHANGE UP
BUN SERPL-MCNC: 8 MG/DL — SIGNIFICANT CHANGE UP (ref 7–23)
CALCIUM SERPL-MCNC: 9.4 MG/DL — SIGNIFICANT CHANGE UP (ref 8.5–10.5)
CHLORIDE SERPL-SCNC: 103 MMOL/L — SIGNIFICANT CHANGE UP (ref 96–108)
CO2 SERPL-SCNC: 27 MMOL/L — SIGNIFICANT CHANGE UP (ref 22–31)
COLOR SPEC: YELLOW — SIGNIFICANT CHANGE UP
CREAT SERPL-MCNC: 0.84 MG/DL — SIGNIFICANT CHANGE UP (ref 0.5–1.3)
DIFF PNL FLD: NEGATIVE — SIGNIFICANT CHANGE UP
EGFR: 89 ML/MIN/1.73M2 — SIGNIFICANT CHANGE UP
EOSINOPHIL # BLD AUTO: 0 K/UL — SIGNIFICANT CHANGE UP (ref 0–0.5)
EOSINOPHIL NFR BLD AUTO: 0 % — SIGNIFICANT CHANGE UP (ref 0–6)
EPI CELLS # UR: PRESENT
FLUAV AG NPH QL: SIGNIFICANT CHANGE UP
FLUBV AG NPH QL: SIGNIFICANT CHANGE UP
GLUCOSE SERPL-MCNC: 95 MG/DL — SIGNIFICANT CHANGE UP (ref 70–99)
GLUCOSE UR QL: NEGATIVE MG/DL — SIGNIFICANT CHANGE UP
GRAN CASTS # UR COMP ASSIST: SIGNIFICANT CHANGE UP
HCG SERPL-ACNC: <1 MIU/ML — SIGNIFICANT CHANGE UP
HCT VFR BLD CALC: 42.6 % — SIGNIFICANT CHANGE UP (ref 34.5–45)
HGB BLD-MCNC: 14.1 G/DL — SIGNIFICANT CHANGE UP (ref 11.5–15.5)
HIV 1 & 2 AB SERPL IA.RAPID: SIGNIFICANT CHANGE UP
KETONES UR-MCNC: NEGATIVE MG/DL — SIGNIFICANT CHANGE UP
LACTATE BLDV-MCNC: 1.3 MMOL/L — SIGNIFICANT CHANGE UP (ref 0.5–2)
LEUKOCYTE ESTERASE UR-ACNC: ABNORMAL
LIDOCAIN IGE QN: 33 U/L — SIGNIFICANT CHANGE UP (ref 16–77)
LYMPHOCYTES # BLD AUTO: 18 % — SIGNIFICANT CHANGE UP (ref 13–44)
LYMPHOCYTES # BLD AUTO: 2.69 K/UL — SIGNIFICANT CHANGE UP (ref 1–3.3)
MANUAL SMEAR VERIFICATION: SIGNIFICANT CHANGE UP
MCHC RBC-ENTMCNC: 28.8 PG — SIGNIFICANT CHANGE UP (ref 27–34)
MCHC RBC-ENTMCNC: 33.1 GM/DL — SIGNIFICANT CHANGE UP (ref 32–36)
MCV RBC AUTO: 86.9 FL — SIGNIFICANT CHANGE UP (ref 80–100)
MONOCYTES # BLD AUTO: 1.2 K/UL — HIGH (ref 0–0.9)
MONOCYTES NFR BLD AUTO: 8 % — SIGNIFICANT CHANGE UP (ref 2–14)
NEUTROPHILS # BLD AUTO: 11.06 K/UL — HIGH (ref 1.8–7.4)
NEUTROPHILS NFR BLD AUTO: 74 % — SIGNIFICANT CHANGE UP (ref 43–77)
NITRITE UR-MCNC: POSITIVE
NRBC # BLD: 0 /100 WBCS — SIGNIFICANT CHANGE UP (ref 0–0)
NRBC # BLD: SIGNIFICANT CHANGE UP /100 WBCS (ref 0–0)
PCO2 BLDV: 35 MMHG — LOW (ref 39–42)
PH BLDV: 7.4 — SIGNIFICANT CHANGE UP (ref 7.32–7.43)
PH UR: 7 — SIGNIFICANT CHANGE UP (ref 5–8)
PLAT MORPH BLD: NORMAL — SIGNIFICANT CHANGE UP
PLATELET # BLD AUTO: 359 K/UL — SIGNIFICANT CHANGE UP (ref 150–400)
PO2 BLDV: 41 MMHG — SIGNIFICANT CHANGE UP (ref 25–45)
POTASSIUM SERPL-MCNC: 4 MMOL/L — SIGNIFICANT CHANGE UP (ref 3.5–5.3)
POTASSIUM SERPL-SCNC: 4 MMOL/L — SIGNIFICANT CHANGE UP (ref 3.5–5.3)
PROT SERPL-MCNC: 8 G/DL — SIGNIFICANT CHANGE UP (ref 6.4–8.2)
PROT UR-MCNC: ABNORMAL MG/DL
RBC # BLD: 4.9 M/UL — SIGNIFICANT CHANGE UP (ref 3.8–5.2)
RBC # FLD: 13.4 % — SIGNIFICANT CHANGE UP (ref 10.3–14.5)
RBC BLD AUTO: NORMAL — SIGNIFICANT CHANGE UP
RBC CASTS # UR COMP ASSIST: 0 /HPF — SIGNIFICANT CHANGE UP (ref 0–4)
RSV RNA NPH QL NAA+NON-PROBE: SIGNIFICANT CHANGE UP
SAO2 % BLDV: 73.2 % — SIGNIFICANT CHANGE UP (ref 67–88)
SARS-COV-2 RNA SPEC QL NAA+PROBE: SIGNIFICANT CHANGE UP
SODIUM SERPL-SCNC: 140 MMOL/L — SIGNIFICANT CHANGE UP (ref 132–145)
SP GR SPEC: 1.01 — SIGNIFICANT CHANGE UP (ref 1–1.03)
UROBILINOGEN FLD QL: 1 MG/DL — SIGNIFICANT CHANGE UP (ref 0.2–1)
WBC # BLD: 14.94 K/UL — HIGH (ref 3.8–10.5)
WBC # FLD AUTO: 14.94 K/UL — HIGH (ref 3.8–10.5)
WBC UR QL: 53 /HPF — HIGH (ref 0–5)

## 2024-07-27 PROCEDURE — 99285 EMERGENCY DEPT VISIT HI MDM: CPT

## 2024-07-27 PROCEDURE — 76830 TRANSVAGINAL US NON-OB: CPT | Mod: 26

## 2024-07-27 PROCEDURE — 74177 CT ABD & PELVIS W/CONTRAST: CPT | Mod: 26,MC

## 2024-07-27 RX ORDER — SODIUM CHLORIDE 0.9 % (FLUSH) 0.9 %
1000 SYRINGE (ML) INJECTION ONCE
Refills: 0 | Status: COMPLETED | OUTPATIENT
Start: 2024-07-27 | End: 2024-07-27

## 2024-07-27 RX ORDER — METRONIDAZOLE 500 MG/1
500 TABLET ORAL ONCE
Refills: 0 | Status: COMPLETED | OUTPATIENT
Start: 2024-07-27 | End: 2024-07-27

## 2024-07-27 RX ORDER — CEFPODOXIME PROXETIL 50 MG/5 ML
1 SUSPENSION, RECONSTITUTED, ORAL (ML) ORAL
Qty: 20 | Refills: 0
Start: 2024-07-27 | End: 2024-08-05

## 2024-07-27 RX ORDER — ONDANSETRON HYDROCHLORIDE 2 MG/ML
1 INJECTION INTRAMUSCULAR; INTRAVENOUS
Qty: 12 | Refills: 0
Start: 2024-07-27 | End: 2024-07-29

## 2024-07-27 RX ORDER — KETOROLAC TROMETHAMINE 30 MG/ML
15 INJECTION, SOLUTION INTRAMUSCULAR ONCE
Refills: 0 | Status: DISCONTINUED | OUTPATIENT
Start: 2024-07-27 | End: 2024-07-27

## 2024-07-27 RX ORDER — ONDANSETRON HYDROCHLORIDE 2 MG/ML
4 INJECTION INTRAMUSCULAR; INTRAVENOUS ONCE
Refills: 0 | Status: COMPLETED | OUTPATIENT
Start: 2024-07-27 | End: 2024-07-27

## 2024-07-27 RX ORDER — CEFTRIAXONE SODIUM 500 MG
1000 VIAL (EA) INJECTION ONCE
Refills: 0 | Status: COMPLETED | OUTPATIENT
Start: 2024-07-27 | End: 2024-07-27

## 2024-07-27 RX ORDER — METRONIDAZOLE 500 MG/1
1 TABLET ORAL
Qty: 21 | Refills: 0
Start: 2024-07-27 | End: 2024-08-02

## 2024-07-27 RX ORDER — CEFUROXIME SODIUM 7.5 G
1 VIAL (EA) INTRAVENOUS
Qty: 20 | Refills: 0
Start: 2024-07-27 | End: 2024-08-05

## 2024-07-27 RX ORDER — DOXYCYCLINE 100 MG/1
100 CAPSULE ORAL ONCE
Refills: 0 | Status: COMPLETED | OUTPATIENT
Start: 2024-07-27 | End: 2024-07-27

## 2024-07-27 RX ORDER — OXYCODONE AND ACETAMINOPHEN 5; 325 MG/1; MG/1
1 TABLET ORAL
Qty: 12 | Refills: 0
Start: 2024-07-27 | End: 2024-07-29

## 2024-07-27 RX ORDER — MORPHINE SULFATE 100 MG/1
4 TABLET, EXTENDED RELEASE ORAL ONCE
Refills: 0 | Status: DISCONTINUED | OUTPATIENT
Start: 2024-07-27 | End: 2024-07-27

## 2024-07-27 RX ORDER — AMOXICILLIN/POTASSIUM CLAV 250-125 MG
1 TABLET ORAL
Qty: 1 | Refills: 0
Start: 2024-07-27 | End: 2024-08-05

## 2024-07-27 RX ADMIN — DOXYCYCLINE 100 MILLIGRAM(S): 100 CAPSULE ORAL at 21:05

## 2024-07-27 RX ADMIN — MORPHINE SULFATE 4 MILLIGRAM(S): 100 TABLET, EXTENDED RELEASE ORAL at 19:35

## 2024-07-27 RX ADMIN — METRONIDAZOLE 500 MILLIGRAM(S): 500 TABLET ORAL at 21:05

## 2024-07-27 RX ADMIN — ONDANSETRON HYDROCHLORIDE 4 MILLIGRAM(S): 2 INJECTION INTRAMUSCULAR; INTRAVENOUS at 19:17

## 2024-07-27 RX ADMIN — Medication 100 MILLIGRAM(S): at 21:05

## 2024-07-27 RX ADMIN — Medication 1000 MILLILITER(S): at 19:17

## 2024-07-27 RX ADMIN — KETOROLAC TROMETHAMINE 15 MILLIGRAM(S): 30 INJECTION, SOLUTION INTRAMUSCULAR at 19:17

## 2024-07-27 NOTE — ED PROVIDER NOTE - CLINICAL SUMMARY MEDICAL DECISION MAKING FREE TEXT BOX
42 y/o F with no reported pmhx presents to the ED for b/l lower back pain, pelvic pain, b/l lower abd pain, nausea that started a week ago, however worse today and last night. Pt was diagnosed with BV three weeks ago and given metronidazole vaginal cream which pt states she has not been using daily as instructed, only one some days. Pt last used just prior to coming to the ED. Pt reports continue discharge described as thick white, unable to tell if there is a foul smell to discharge as pt has nasal congestion. Pt also has a new sexual partner and would like STD testing. In addition, pt reports that she was bit by her friend's dog yesterday on the left thigh. Per pt, dog is up to date on all vaccines including rabies vaccine. Denies fever/chills, CP, SOB, vomiting, diarrhea or constipation, hematuria. Pt s/p hysterectomy. NKDA.   Patient currently afebrile, hemodynamically stable, spO2 100%. Pt in acute distress 2/2 pain and nausea. Physical exam with significant CVA tenderness b/l, RLQ tenderness to palpation. Based on history and physical, differentials include but are not limited to ovarian cyst vs PID/TOA vs kidney stone vs UTI/pyelo. Plan to assess patient for acute pathology as listed above with labs, US, CT, STD tests. Rabies vaccine not indicated at this time. Abx given to cover for BV, candida, UTI and for dog bite given. Will f/u results and d/c.

## 2024-07-27 NOTE — ED ADULT TRIAGE NOTE - CHIEF COMPLAINT QUOTE
BIBEMS from home, ab pain since yesterday. Denies n/v/d. dog bite to upper left thigh from yesterday.

## 2024-07-27 NOTE — ED ADULT NURSE NOTE - NSFALLUNIVINTERV_ED_ALL_ED
Bed/Stretcher in lowest position, wheels locked, appropriate side rails in place/Call bell, personal items and telephone in reach/Instruct patient to call for assistance before getting out of bed/chair/stretcher/Non-slip footwear applied when patient is off stretcher/[place] to call system/Physically safe environment - no spills, clutter or unnecessary equipment/Purposeful proactive rounding/Room/bathroom lighting operational, light cord in reach

## 2024-07-27 NOTE — ED PROVIDER NOTE - NSFOLLOWUPINSTRUCTIONS_ED_ALL_ED_FT
You were prescribed antibiotics to treat kidney infection and to prevent infection from dog bite and to treat bacterial vaginosis. Please do not drink alcohol when taking this medication, it will make you very sick.  Drink plenty of fluids.    Pyelonephritis is a kidney infection. In most cases, the infection clears up with treatment and does not cause further problems. More severe infections or chronic infections can sometimes spread to the bloodstream or lead to other problems with the kidneys. Symptoms include frequent or painful urination, abdominal pain, back pain, flank pain, fever/chills, nausea, or vomiting. If you were prescribed an antibiotic medicine, take it as told by your health care provider. Do not stop taking the antibiotic even if you start to feel better.    SEEK IMMEDIATE MEDICAL CARE IF YOU HAVE ANY OF THE FOLLOWING SYMPTOMS: inability to hold down antibiotics or fluids, worsening pain, dizziness/lightheadedness, or change in mental status.     PLEASE FOLLOW-UP WITH YOUR PRIMARY CARE DOCTOR IN 1-2 DAYS FOR FURTHER EVALUATION.      PLEASE TAKE ALL PAPERWORK FROM TODAY'S VISIT TO YOUR PRIMARY DOCTOR.     IF YOU DO NOT HAVE A PRIMARY CARE DOCTOR PLEASE REFER TO THE OFFICE/CLINIC INFORMATION GIVEN BELOW:    If you do not have a doctor, you can call our referral line to find a doctor that matches your insurance; the number is [phone].     You can also follow up with clinics listed below, if you do not have a doctor:  [place] [address]  To make an appointment, call [phone]    [facility]  Address: [address] [place] [address]  Appointment Center: [phone] ([phone])     To access your record on the [facility] portal [facility], please visit:  [email or web address]  If you are having difficulties setting this up, call [phone] and someone can assist you over the phone.     PLEASE RETURN TO THE ER IMMEDIATELY OR CALL 134 ANY HIGH FEVER, CHEST PAIN, TROUBLE BREATHING, VOMITING, SEVERE PAIN, OR ANY OTHER CONCERNS

## 2024-07-27 NOTE — ED PROVIDER NOTE - OBJECTIVE STATEMENT
42 y/o F with no reported pmhx presents to the ED for b/l lower back pain, pelvic pain, b/l lower abd pain, nausea that started a week ago, however worse today and last night. Pt was diagnosed with BV three weeks ago and given metronidazole vaginal cream which pt states she has not been using daily as instructed, only one some days. Pt last used just prior to coming to the ED. Pt reports continue discharge described as thick white, unable to tell if there is a foul smell to discharge as pt has nasal congestion. Pt also has a new sexual partner and would like STD testing. In addition, pt reports that she was bit by her friend's dog yesterday on the left thigh. Per pt, dog is up to date on all vaccines including rabies vaccine. Denies fever/chills, CP, SOB, vomiting, diarrhea or constipation, hematuria, urinary incontinence, extremity numbness/tingling/weakness. Pt s/p hysterectomy. NKDA.

## 2024-07-27 NOTE — ED PROVIDER NOTE - PATIENT PORTAL LINK FT
You can access the FollowMyHealth Patient Portal offered by [facility] by registering at the following website: [email or web address]. By joining [facility]’s FollowMyHealth portal, you will also be able to view your health information using other applications (apps) compatible with our system.

## 2024-07-27 NOTE — ED PROVIDER NOTE - PROGRESS NOTE DETAILS
Patient signed out to me pending CT abdomen read. CT abdomen shows no appendicitis, findings consistent with bilateral pyelonephritis. urine culture pending. s/p IV ceftriaxone in ED. patient asking to discharged, she looks well. she is tolerating po. will rx cefpoxidime and flagyl (to cover for dog bite prophylaxis and BV as well),  rx analgesia sent to pharmacy, advised f/u pcp, return precautions discussed. MAREK Taylor

## 2024-07-27 NOTE — ED PROVIDER NOTE - PHYSICAL EXAMINATION
General: Well appearing in no acute distress, alert and cooperative  Neck: Soft and supple, full ROM without pain, no midline tenderness  Cardiac: Regular rate and regular rhythm, no murmurs  Resp: Unlabored respiratory effort, lungs CTAB, speaking in full sentences, no wheezes  Abd: +tenderness to palpation in RLQ. Soft, non-distended, no guarding or rebound tenderness. +b/l CVA tenderness to palpation  : External genitalia appear normal. Speculum exam with thick white cottage cheese like discharge as well as clear white thick gel (consistent with metronidazole cream). R adnexal tenderness to palpation. Pt s/p hysterectomy, unable to assess for CMT.   MSK: Spine midline and non-tender  Skin: Warm and dry. +area of ecchymosis over posterior aspect of left thigh with two punctate wounds noted in center with tenderness to palpation. No purulent drainage or bleeding. No underlying hematoma/swelling, warmth, erythema.

## 2024-07-28 LAB
HCV AB S/CO SERPL IA: 0.11 S/CO — SIGNIFICANT CHANGE UP (ref 0–0.99)
HCV AB SERPL-IMP: SIGNIFICANT CHANGE UP

## 2024-07-29 LAB
C TRACH RRNA SPEC QL NAA+PROBE: SIGNIFICANT CHANGE UP
N GONORRHOEA RRNA SPEC QL NAA+PROBE: SIGNIFICANT CHANGE UP
SPECIMEN SOURCE: SIGNIFICANT CHANGE UP

## 2024-07-29 RX ORDER — AMOXICILLIN/POTASSIUM CLAV 250-125 MG
1 TABLET ORAL
Qty: 20 | Refills: 0
Start: 2024-07-29 | End: 2024-08-07

## 2024-07-29 NOTE — ED POST DISCHARGE NOTE - ADDITIONAL DOCUMENTATION
7/31 Patient called back for culture result. Preliminary result given. Has had 4 doses of Augmentin and feels the same pain and nausea but no fever. I recommended patient return today for reevaluation. Patient wants to wait and see how she feels tomorrow. 7/31 Patient called back for culture result. Preliminary result given. Has had 4 doses of Augmentin and feels the same pain and nausea but no fever. I recommended patient return today for reevaluation. Patient wants to wait and see how she feels tomorrow. 8/1 Patient called back. UCx shows sensitive to Augmentin. Patient reports pain still present but improving. Nausea resolved. Chills resolved. No fever. I recommended she f/u with her PMD next week as symptoms are improving and infection is sensitive to antibiotic given. Return to the ED immediately if getting worse, not improving, or if having any new or troubling symptoms.

## 2024-07-29 NOTE — ED POST DISCHARGE NOTE - OTHER COMMUNICATION
7/29 Patient called because cefpodoxime not covered by insurance. I switched to augmentin. Culture pending. 7/29 Patient called because cefpodoxime not covered by insurance. I switched to Augmentin. Culture pending.

## 2024-07-30 LAB — T PALLIDUM AB TITR SER: NEGATIVE — SIGNIFICANT CHANGE UP

## 2024-07-31 LAB
-  AMOXICILLIN/CLAVULANIC ACID: SIGNIFICANT CHANGE UP
-  AMPICILLIN/SULBACTAM: SIGNIFICANT CHANGE UP
-  AMPICILLIN: SIGNIFICANT CHANGE UP
-  CEFAZOLIN: SIGNIFICANT CHANGE UP
-  CEFEPIME: SIGNIFICANT CHANGE UP
-  CEFOXITIN: SIGNIFICANT CHANGE UP
-  CEFTRIAXONE: SIGNIFICANT CHANGE UP
-  CEFUROXIME: SIGNIFICANT CHANGE UP
-  CIPROFLOXACIN: SIGNIFICANT CHANGE UP
-  GENTAMICIN: SIGNIFICANT CHANGE UP
-  LEVOFLOXACIN: SIGNIFICANT CHANGE UP
-  NITROFURANTOIN: SIGNIFICANT CHANGE UP
-  PIPERACILLIN/TAZOBACTAM: SIGNIFICANT CHANGE UP
-  TOBRAMYCIN: SIGNIFICANT CHANGE UP
-  TRIMETHOPRIM/SULFAMETHOXAZOLE: SIGNIFICANT CHANGE UP
CULTURE RESULTS: ABNORMAL
METHOD TYPE: SIGNIFICANT CHANGE UP
ORGANISM # SPEC MICROSCOPIC CNT: ABNORMAL
ORGANISM # SPEC MICROSCOPIC CNT: SIGNIFICANT CHANGE UP
SPECIMEN SOURCE: SIGNIFICANT CHANGE UP

## 2024-07-31 RX ORDER — OXYCODONE AND ACETAMINOPHEN 5; 325 MG/1; MG/1
1 TABLET ORAL
Qty: 12 | Refills: 0
Start: 2024-07-31 | End: 2024-08-02

## 2025-01-08 ENCOUNTER — EMERGENCY (EMERGENCY)
Age: 45
LOS: 1 days | Discharge: HOME | End: 2025-01-08
Admitting: EMERGENCY MEDICINE
Payer: MEDICARE

## 2025-01-08 VITALS
DIASTOLIC BLOOD PRESSURE: 91 MMHG | RESPIRATION RATE: 18 BRPM | SYSTOLIC BLOOD PRESSURE: 138 MMHG | TEMPERATURE: 98 F | OXYGEN SATURATION: 98 % | HEART RATE: 71 BPM

## 2025-01-08 VITALS
SYSTOLIC BLOOD PRESSURE: 126 MMHG | OXYGEN SATURATION: 98 % | HEIGHT: 66 IN | TEMPERATURE: 98 F | DIASTOLIC BLOOD PRESSURE: 74 MMHG | HEART RATE: 88 BPM | RESPIRATION RATE: 18 BRPM

## 2025-01-08 DIAGNOSIS — I10 ESSENTIAL (PRIMARY) HYPERTENSION: ICD-10-CM

## 2025-01-08 DIAGNOSIS — Z98.890 OTHER SPECIFIED POSTPROCEDURAL STATES: Chronic | ICD-10-CM

## 2025-01-08 DIAGNOSIS — J11.1 INFLUENZA DUE TO UNIDENTIFIED INFLUENZA VIRUS WITH OTHER RESPIRATORY MANIFESTATIONS: ICD-10-CM

## 2025-01-08 DIAGNOSIS — J45.909 UNSPECIFIED ASTHMA, UNCOMPLICATED: ICD-10-CM

## 2025-01-08 DIAGNOSIS — Z90.710 ACQUIRED ABSENCE OF BOTH CERVIX AND UTERUS: Chronic | ICD-10-CM

## 2025-01-08 DIAGNOSIS — Z87.81 PERSONAL HISTORY OF (HEALED) TRAUMATIC FRACTURE: Chronic | ICD-10-CM

## 2025-01-08 DIAGNOSIS — R05.1 ACUTE COUGH: ICD-10-CM

## 2025-01-08 LAB
ALBUMIN SERPL ELPH-MCNC: 3.4 G/DL — SIGNIFICANT CHANGE UP (ref 3.4–5)
ALP SERPL-CCNC: 117 U/L — SIGNIFICANT CHANGE UP (ref 40–120)
ALT FLD-CCNC: 46 U/L — HIGH (ref 12–42)
ANION GAP SERPL CALC-SCNC: 10 MMOL/L — SIGNIFICANT CHANGE UP (ref 9–16)
AST SERPL-CCNC: 27 U/L — SIGNIFICANT CHANGE UP (ref 15–37)
BASOPHILS # BLD AUTO: 0.03 K/UL — SIGNIFICANT CHANGE UP (ref 0–0.2)
BASOPHILS NFR BLD AUTO: 0.4 % — SIGNIFICANT CHANGE UP (ref 0–2)
BILIRUB SERPL-MCNC: 0.6 MG/DL — SIGNIFICANT CHANGE UP (ref 0.2–1.2)
BUN SERPL-MCNC: 17 MG/DL — SIGNIFICANT CHANGE UP (ref 7–23)
CALCIUM SERPL-MCNC: 8.7 MG/DL — SIGNIFICANT CHANGE UP (ref 8.5–10.5)
CHLORIDE SERPL-SCNC: 106 MMOL/L — SIGNIFICANT CHANGE UP (ref 96–108)
CO2 SERPL-SCNC: 21 MMOL/L — LOW (ref 22–31)
CREAT SERPL-MCNC: 0.85 MG/DL — SIGNIFICANT CHANGE UP (ref 0.5–1.3)
D DIMER BLD IA.RAPID-MCNC: <187 NG/ML DDU — SIGNIFICANT CHANGE UP
EGFR: 87 ML/MIN/1.73M2 — SIGNIFICANT CHANGE UP
EGFR: 87 ML/MIN/1.73M2 — SIGNIFICANT CHANGE UP
EOSINOPHIL # BLD AUTO: 0.06 K/UL — SIGNIFICANT CHANGE UP (ref 0–0.5)
EOSINOPHIL NFR BLD AUTO: 0.7 % — SIGNIFICANT CHANGE UP (ref 0–6)
FLUAV AG NPH QL: DETECTED
FLUBV AG NPH QL: SIGNIFICANT CHANGE UP
GLUCOSE SERPL-MCNC: 98 MG/DL — SIGNIFICANT CHANGE UP (ref 70–99)
HCT VFR BLD CALC: 42 % — SIGNIFICANT CHANGE UP (ref 34.5–45)
HGB BLD-MCNC: 13.8 G/DL — SIGNIFICANT CHANGE UP (ref 11.5–15.5)
IMM GRANULOCYTES NFR BLD AUTO: 0.6 % — SIGNIFICANT CHANGE UP (ref 0–0.9)
LYMPHOCYTES # BLD AUTO: 3.29 K/UL — SIGNIFICANT CHANGE UP (ref 1–3.3)
LYMPHOCYTES # BLD AUTO: 39.9 % — SIGNIFICANT CHANGE UP (ref 13–44)
MCHC RBC-ENTMCNC: 28.9 PG — SIGNIFICANT CHANGE UP (ref 27–34)
MCHC RBC-ENTMCNC: 32.9 G/DL — SIGNIFICANT CHANGE UP (ref 32–36)
MCV RBC AUTO: 88.1 FL — SIGNIFICANT CHANGE UP (ref 80–100)
MONOCYTES # BLD AUTO: 1 K/UL — HIGH (ref 0–0.9)
MONOCYTES NFR BLD AUTO: 12.1 % — SIGNIFICANT CHANGE UP (ref 2–14)
NEUTROPHILS # BLD AUTO: 3.81 K/UL — SIGNIFICANT CHANGE UP (ref 1.8–7.4)
NEUTROPHILS NFR BLD AUTO: 46.3 % — SIGNIFICANT CHANGE UP (ref 43–77)
NRBC # BLD: 0 /100 WBCS — SIGNIFICANT CHANGE UP (ref 0–0)
NRBC BLD-RTO: 0 /100 WBCS — SIGNIFICANT CHANGE UP (ref 0–0)
PLATELET # BLD AUTO: 320 K/UL — SIGNIFICANT CHANGE UP (ref 150–400)
POTASSIUM SERPL-MCNC: 4 MMOL/L — SIGNIFICANT CHANGE UP (ref 3.5–5.3)
POTASSIUM SERPL-SCNC: 4 MMOL/L — SIGNIFICANT CHANGE UP (ref 3.5–5.3)
PROT SERPL-MCNC: 7.4 G/DL — SIGNIFICANT CHANGE UP (ref 6.4–8.2)
RBC # BLD: 4.77 M/UL — SIGNIFICANT CHANGE UP (ref 3.8–5.2)
RBC # FLD: 14.1 % — SIGNIFICANT CHANGE UP (ref 10.3–14.5)
RSV RNA NPH QL NAA+NON-PROBE: SIGNIFICANT CHANGE UP
SARS-COV-2 RNA SPEC QL NAA+PROBE: SIGNIFICANT CHANGE UP
SODIUM SERPL-SCNC: 137 MMOL/L — SIGNIFICANT CHANGE UP (ref 132–145)
TROPONIN I, HIGH SENSITIVITY RESULT: 8.3 NG/L — SIGNIFICANT CHANGE UP
WBC # BLD: 8.24 K/UL — SIGNIFICANT CHANGE UP (ref 3.8–10.5)
WBC # FLD AUTO: 8.24 K/UL — SIGNIFICANT CHANGE UP (ref 3.8–10.5)

## 2025-01-08 PROCEDURE — 99285 EMERGENCY DEPT VISIT HI MDM: CPT

## 2025-01-08 PROCEDURE — 71046 X-RAY EXAM CHEST 2 VIEWS: CPT | Mod: 26

## 2025-01-08 RX ORDER — ALPRAZOLAM 0.5 MG
0.25 TABLET, EXTENDED RELEASE 24 HR ORAL ONCE
Refills: 0 | Status: DISCONTINUED | OUTPATIENT
Start: 2025-01-08 | End: 2025-01-08

## 2025-01-08 RX ORDER — BENZONATATE 100 MG
1 CAPSULE ORAL
Qty: 15 | Refills: 0
Start: 2025-01-08 | End: 2025-01-12

## 2025-03-10 NOTE — ED PROVIDER NOTE - DURATION
[facility] [place]  [address]  [place] [address]     March 10, 2025    Patient: Sinan Yun   YOB: 1977   Date of Visit: 3/10/2025       To Whom It May Concern:    Sinan Yun was seen and treated in our department on 3/10/2025. Please excuse from any missed work this week.      Sincerely,     FRANK Hercules.              
today

## 2025-05-02 ENCOUNTER — EMERGENCY (EMERGENCY)
Facility: HOSPITAL | Age: 45
LOS: 1 days | End: 2025-05-02
Attending: EMERGENCY MEDICINE | Admitting: EMERGENCY MEDICINE
Payer: MEDICARE

## 2025-05-02 VITALS
TEMPERATURE: 99 F | WEIGHT: 186.95 LBS | OXYGEN SATURATION: 97 % | DIASTOLIC BLOOD PRESSURE: 76 MMHG | SYSTOLIC BLOOD PRESSURE: 111 MMHG | HEART RATE: 88 BPM | HEIGHT: 66 IN

## 2025-05-02 DIAGNOSIS — Z87.81 PERSONAL HISTORY OF (HEALED) TRAUMATIC FRACTURE: Chronic | ICD-10-CM

## 2025-05-02 DIAGNOSIS — Z90.710 ACQUIRED ABSENCE OF BOTH CERVIX AND UTERUS: Chronic | ICD-10-CM

## 2025-05-02 DIAGNOSIS — Z98.890 OTHER SPECIFIED POSTPROCEDURAL STATES: Chronic | ICD-10-CM

## 2025-05-02 PROCEDURE — 99284 EMERGENCY DEPT VISIT MOD MDM: CPT | Mod: 25

## 2025-05-02 PROCEDURE — 12001 RPR S/N/AX/GEN/TRNK 2.5CM/<: CPT

## 2025-05-02 NOTE — ED PROVIDER NOTE - PATIENT PORTAL LINK FT
You can access the FollowMyHealth Patient Portal offered by [facility] by registering at the following website: [email or web address]. By joining [employer]’s FollowMyHealth portal, you will also be able to view your health information using other applications (apps) compatible with our system.

## 2025-05-02 NOTE — ED PROVIDER NOTE - PHYSICAL EXAMINATION
CONSTITUTIONAL: Well-appearing; well-nourished; in no apparent distress. Non-toxic appearing.   NEURO: Alert & oriented. Gait steady without assistance. Sensory and motor functions are grossly intact.  PSYCH: Mood appropriate. Thought processes intact.   NECK: Supple  CARD: Regular rate and rhythm, no murmurs  RESP: No accessory muscle use; breath sounds clear and equal bilaterally; no wheezes, rhonchi, or rales     ABD: Soft; non-distended; non-tender.   MUSCULOSKELETAL/EXTREMITIES: FROM in all four extremities; no extremity edema.  SKIN: 1.5cm linear laceration on dorsal lateral 2nd MCP joint. full ROM on 2nd MCP joint. bleeding is controlled.

## 2025-05-02 NOTE — ED ADULT TRIAGE NOTE - CHIEF COMPLAINT QUOTE
Patient c/o right hand laceration after taking out the trash. Reports glass cut hand- wrapped in gauze and pressure applied. Tetanus UTD

## 2025-05-02 NOTE — ED PROVIDER NOTE - CLINICAL SUMMARY MEDICAL DECISION MAKING FREE TEXT BOX
pt here for laceration to her right hand. exam found 1.5cm linear laceration on dorsal lateral 2nd MCP joint. full ROM on 2nd MCP joint. bleeding is controlled.  suture applied.

## 2025-05-02 NOTE — ED PROVIDER NOTE - NS_EDPROVIDERDISPOUSERTYPE_ED_A_ED
Left message for pateint asking to return call.   Paperwork in Cindi's bin.  [facility] ph. #: [phone].     I have personally evaluated and examined the patient. The Attending was available to me as a supervising provider if needed.

## 2025-05-02 NOTE — ED PROVIDER NOTE - OBJECTIVE STATEMENT
44-year-old female with history of anxiety, complaining of laceration to right hand. Patient was throwing out trash and shoved trash inside a trash can but got cut by a piece of broke glass.  Denies foreign body in the wound.  Tetanus shot is up-to-date.

## 2025-05-02 NOTE — ED ADULT NURSE NOTE - NSFALLUNIVINTERV_ED_ALL_ED
Bed/Stretcher in lowest position, wheels locked, appropriate side rails in place/Call bell, personal items and telephone in reach/Instruct patient to call for assistance before getting out of bed/chair/stretcher/Non-slip footwear applied when patient is off stretcher/[place] to call system/Physically safe environment - no spills, clutter or unnecessary equipment/Purposeful proactive rounding/Room/bathroom lighting operational, light cord in reach

## 2025-05-02 NOTE — ED ADULT NURSE NOTE - OBJECTIVE STATEMENT
Patient came in c/o right hand laceration after taking out the trash. Reports glass cut hand. Dressing in place and bleeding controlledwrapped in gauze. TDAP UTD

## 2025-05-06 DIAGNOSIS — S61.411A LACERATION WITHOUT FOREIGN BODY OF RIGHT HAND, INITIAL ENCOUNTER: ICD-10-CM
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
## 2025-05-09 ENCOUNTER — EMERGENCY (EMERGENCY)
Facility: HOSPITAL | Age: 45
LOS: 1 days | End: 2025-05-09
Admitting: EMERGENCY MEDICINE
Payer: MEDICARE

## 2025-05-09 VITALS
HEIGHT: 67 IN | SYSTOLIC BLOOD PRESSURE: 125 MMHG | TEMPERATURE: 99 F | WEIGHT: 184.97 LBS | HEART RATE: 78 BPM | RESPIRATION RATE: 16 BRPM | OXYGEN SATURATION: 98 % | DIASTOLIC BLOOD PRESSURE: 85 MMHG

## 2025-05-09 DIAGNOSIS — Z87.81 PERSONAL HISTORY OF (HEALED) TRAUMATIC FRACTURE: Chronic | ICD-10-CM

## 2025-05-09 DIAGNOSIS — Z90.710 ACQUIRED ABSENCE OF BOTH CERVIX AND UTERUS: Chronic | ICD-10-CM

## 2025-05-09 DIAGNOSIS — S61.401D UNSPECIFIED OPEN WOUND OF RIGHT HAND, SUBSEQUENT ENCOUNTER: ICD-10-CM

## 2025-05-09 DIAGNOSIS — Z98.890 OTHER SPECIFIED POSTPROCEDURAL STATES: Chronic | ICD-10-CM

## 2025-05-09 PROCEDURE — L9995: CPT

## 2025-05-09 NOTE — ED ADULT TRIAGE NOTE - CHIEF COMPLAINT QUOTE
patient walked in for sutures removal to the right index finger, c/o mild pain. Denies swelling, redness, fever, drainage.

## 2025-05-09 NOTE — ED PROVIDER NOTE - PATIENT PORTAL LINK FT
You can access the FollowMyHealth Patient Portal offered by [facility] by registering at the following website: [email or web address]. By joining [employer]’s FollowMyHealth portal, you will also be able to view your health information using other applications (apps) compatible with our system.

## 2025-05-09 NOTE — ED ADULT NURSE NOTE - OBJECTIVE STATEMENT
Patient presents for sutures removal of the right hand. C/o mild pain. Denies, redness, swelling, purulent drainage.

## 2025-05-09 NOTE — ED PROVIDER NOTE - CLINICAL SUMMARY MEDICAL DECISION MAKING FREE TEXT BOX
Render Risk Assessment In Note?: no
Detail Level: Simple
Additional Notes: Patient consent was obtained to proceed with the visit and recommended plan of care after discussion of all risks and benefits including the risks of Covid-19 exposure.
Patient here for suture removal.  No acute complaints.  Sutures removed without complications

## 2025-05-09 NOTE — ED PROVIDER NOTE - PHYSICAL EXAMINATION
Physical Exam  GEN: Awake, alert, non-toxic appearing, NCAT  EYES: PERRL, full EOMI,  ENT: External inspection normal, normal voice, no oropharyngeal ulcerations/lesions/swelling  HEAD: atraumatic  NECK: FROM neck, supple, no meningismus, trachea midline, no JVD  MSK: FROM all 4 extremities, N/V intact,   SKIN: Color normal for race, warm and dry, no rash  -healing wound to right hand. Sutures in place  NEURO: Oriented x3, CN 2-12 grossly intact, normal motor, normal sensory

## 2025-05-17 ENCOUNTER — EMERGENCY (EMERGENCY)
Facility: HOSPITAL | Age: 45
LOS: 1 days | End: 2025-05-17
Attending: EMERGENCY MEDICINE | Admitting: EMERGENCY MEDICINE
Payer: MEDICARE

## 2025-05-17 VITALS
TEMPERATURE: 99 F | OXYGEN SATURATION: 95 % | RESPIRATION RATE: 18 BRPM | SYSTOLIC BLOOD PRESSURE: 143 MMHG | DIASTOLIC BLOOD PRESSURE: 83 MMHG | HEART RATE: 88 BPM | HEIGHT: 67 IN

## 2025-05-17 DIAGNOSIS — Z87.81 PERSONAL HISTORY OF (HEALED) TRAUMATIC FRACTURE: Chronic | ICD-10-CM

## 2025-05-17 DIAGNOSIS — Z90.710 ACQUIRED ABSENCE OF BOTH CERVIX AND UTERUS: Chronic | ICD-10-CM

## 2025-05-17 DIAGNOSIS — Z98.890 OTHER SPECIFIED POSTPROCEDURAL STATES: Chronic | ICD-10-CM

## 2025-05-17 PROCEDURE — 73130 X-RAY EXAM OF HAND: CPT | Mod: 26,RT

## 2025-05-17 PROCEDURE — 73660 X-RAY EXAM OF TOE(S): CPT | Mod: 26,RT

## 2025-05-17 PROCEDURE — 99284 EMERGENCY DEPT VISIT MOD MDM: CPT

## 2025-05-17 RX ORDER — OXYCODONE HYDROCHLORIDE AND ACETAMINOPHEN 10; 325 MG/1; MG/1
1 TABLET ORAL ONCE
Refills: 0 | Status: DISCONTINUED | OUTPATIENT
Start: 2025-05-17 | End: 2025-05-17

## 2025-05-17 RX ORDER — OXYCODONE HYDROCHLORIDE AND ACETAMINOPHEN 10; 325 MG/1; MG/1
1 TABLET ORAL
Qty: 8 | Refills: 0
Start: 2025-05-17 | End: 2025-05-18

## 2025-05-17 RX ADMIN — OXYCODONE HYDROCHLORIDE AND ACETAMINOPHEN 1 TABLET(S): 10; 325 TABLET ORAL at 12:51

## 2025-05-17 NOTE — ED PROVIDER NOTE - NSFOLLOWUPINSTRUCTIONS_ED_ALL_ED_FT
Toe Fracture    WHAT YOU NEED TO KNOW:    What is a toe fracture? A toe fracture is a break in a bone in your toe.  Foot Anatomy    What are the signs and symptoms of a toe fracture?    Pain, redness, swelling, or bruising    Not being able to bend or move your toe    Not being able to walk or put weight on your toe    Toe is bent at an angle that is not normal  How is a toe fracture diagnosed? Your healthcare provider will examine you and ask about your injury. You may need the following tests:    An x-ray may show your toe fracture.    A MRI may show a stress fracture or ligament damage. You may be given contrast liquid to help an injury show up better in pictures. Tell a healthcare provider if you have ever had an allergic reaction to contrast liquid. Do not enter the MRI room with anything metal. Metal can cause serious injury. Tell a healthcare provider if you have any metal in or on your body.  How is a toe fracture treated?    Jorge tape, elastic bandage, or a splint may be used to support your toe in its correct position. Jorge tape means your broken toe and the toe next to it are taped together.    A support device such as a cane, crutches, walking boot, or hard-soled shoe may be needed. These help protect your toe and limit movement so it can heal.  Walking Boot      Medicines may be given to prevent or treat pain or a bacterial infection.    Closed reduction is used to move your bones back into place without surgery.    Surgery may be needed if the bone is out of place or the toe joint is damaged. Wires, pins, or other hardware may be used to keep your bone in place while it heals.  How can I manage my symptoms?    Rest your toe so that it can heal. Return to normal activities as directed.    Apply ice on your toe for 15 to 20 minutes every hour or as directed. Use an ice pack, or put crushed ice in a plastic bag. Cover it with a towel before you put it on your toe. Ice helps prevent tissue damage and decreases swelling and pain.    Elevate your toe above the level of your heart as often as you can. This will help decrease swelling and pain. Prop your toe on pillows or blankets to keep it elevated comfortably.  Elevate Leg  When should I seek immediate care?    Blood soaks through your bandage.    You have severe pain in your toe.    Your toe is cold or numb.  When should I call my doctor?    You have a fever.    Your pain does not go away, even after treatment.    Your toe continues to hurt even after it has healed.    You have questions or concerns about your condition or care.  CARE AGREEMENT:    You have the right to help plan your care. Learn about your health condition and how it may be treated. Discuss treatment options with your healthcare providers to decide what care you want to receive. You always have the right to refuse treatment.

## 2025-05-17 NOTE — ED PROVIDER NOTE - INTERNATIONAL TRAVEL
No Asc Procedure Text (C): After obtaining clear surgical margins the patient was sent to an ASC for surgical repair.  The patient understands they will receive post-surgical care and follow-up from the ASC physician.

## 2025-05-17 NOTE — ED PROVIDER NOTE - CLINICAL SUMMARY MEDICAL DECISION MAKING FREE TEXT BOX
Patient with history of laceration to right hand 1 week ago presents with 1 week of right great toe swelling after mechanical injury to toe.  On exam, patient is afebrile, vital signs stable.  Patient has tenderness and swelling of right great toe and healing laceration noted to right hand.  X-ray of hand negative.  X-ray of toe shows fracture at proximal phalanx.  Plan for hard soled shoe, analgesia, podiatry/Ortho referral.

## 2025-05-17 NOTE — ED ADULT NURSE NOTE - OBJECTIVE STATEMENT
Pt endorses right index finger pain, states was previously in department, received stitches that have since been taken out and has increased pain since, unable to bend finger. Pt endorses severe pain to R 1st and 2nd toe, endorses fall 5 days ago, unable to move toes. difficulty bearing weight. Bruising present. States had surgery to right foot in 2019 after break in 5 places. Pt A&Ox4, speaking in full sentences.

## 2025-05-17 NOTE — ED ADULT TRIAGE NOTE - CHIEF COMPLAINT QUOTE
right toe pain x1 week. states she got out of a car wrong, unsure if fractured. painful to ambulate.

## 2025-05-17 NOTE — ED ADULT NURSE NOTE - NSFALLRISKINTERV_ED_ALL_ED
Assistance OOB with selected safe patient handling equipment if applicable/Assistance with ambulation/Communicate fall risk and risk factors to all staff, patient, and family/Monitor gait and stability/Provide visual cue: yellow wristband, yellow gown, etc/Reinforce activity limits and safety measures with patient and family/Call bell, personal items and telephone in reach/Instruct patient to call for assistance before getting out of bed/chair/stretcher/Non-slip footwear applied when patient is off stretcher/[place] to call system/Physically safe environment - no spills, clutter or unnecessary equipment/Purposeful Proactive Rounding/Room/bathroom lighting operational, light cord in reach

## 2025-05-17 NOTE — ED PROVIDER NOTE - CARE PROVIDERS DIRECT ADDRESSES
[email or web address]

## 2025-05-17 NOTE — ED PROVIDER NOTE - PATIENT PORTAL LINK FT
You can access the FollowMyHealth Patient Portal offered by [facility] by registering at the following website: [email or web address]. By joining [employer]’s FollowMyHealth portal, you will also be able to view your health information using other applications (apps) compatible with our system.

## 2025-05-17 NOTE — ED PROVIDER NOTE - CARE PROVIDER_API CALL
Surya Garcia  Podiatric Medicine and Surgery  [address]  [place] [address]  Phone: [phone]  Fax: [phone]  Follow Up Time:     Jaxson Peter  Orthopaedic Surgery  [address] [place] [address]  Phone: [phone]  Fax: [phone]  Follow Up Time:

## 2025-05-17 NOTE — ED PROVIDER NOTE - OBJECTIVE STATEMENT
44-year-old female with history of wound to right hand sustained last week presents with 1 week of left great toe swelling, bruising, and pain after she stubbed her toe while out of town.  Has been walking on her foot with pain since then.  Wants to know if it is broken.  Also asking for an x-ray of her right hand in the area of her wound because she did not have an x-ray done when she had her wound repaired.

## 2025-05-17 NOTE — ED PROVIDER NOTE - PHYSICAL EXAMINATION
Constitutional: awake and alert, in no acute distress  HEENT: head normocephalic and atraumatic. moist mucous membranes  Eyes: extraocular movements intact, normal conjunctiva  Neck: supple, normal ROM  Cardiovascular: regular rate   Pulmonary: no respiratory distress  Gastrointestinal: abdomen flat and nondistended  Skin: warm, dry, normal for ethnicity  Musculoskeletal: R hand: healing lac to radial aspect of hand at 2nd MCP.  R foot: bruising and swelling with TTP of great toe.    Neurological: oriented x4, no focal neurologic deficit.   Psychiatric: calm and cooperative
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
## 2025-05-20 DIAGNOSIS — S61.411A LACERATION WITHOUT FOREIGN BODY OF RIGHT HAND, INITIAL ENCOUNTER: ICD-10-CM
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
## 2025-05-20 DIAGNOSIS — S92.414A NONDISPLACED FRACTURE OF PROXIMAL PHALANX OF RIGHT GREAT TOE, INITIAL ENCOUNTER FOR CLOSED FRACTURE: ICD-10-CM

## 2025-05-20 NOTE — ED POST DISCHARGE NOTE - DETAILS
patient informed of R toe fracture, has hx of surgery to the site, and will see her podiatrist on thursday

## 2025-06-12 NOTE — ED ADULT NURSE NOTE - FINAL NURSING ELECTRONIC SIGNATURE
[Joint Pain] : joint pain [Under Stress] : under stress [Feeling Fatigued] : feeling fatigued [Dyspnea on exertion] : dyspnea during exertion [Chest Discomfort] : chest discomfort [Lower Ext Edema] : lower extremity edema [Palpitations] : palpitations [Confusion] : no confusion was observed [Negative] : Musculoskeletal [[email or web address]] : see HPI  01-Sep-2019 00:20

## (undated) DEVICE — DRSG DERMABOND 0.7ML

## (undated) DEVICE — XI ARM FORCEP FENESTRATED BIPOLAR 8MM

## (undated) DEVICE — NDL SPINAL 22G X 3.5" (BLACK)

## (undated) DEVICE — RUMI COLPO-PNEUMO OCCLUDER

## (undated) DEVICE — XI TIP COVER

## (undated) DEVICE — SUT VICRYL 0 27" UR-6

## (undated) DEVICE — UTERINE MANIPULATOR CONMED VCARE MED 34MM

## (undated) DEVICE — TUBING STRYKER PNEUMOCLEAR SMOKE EVACUATION HIGH FLOW

## (undated) DEVICE — TIP METZENBAUM SCISSOR MONOPOLAR ENDOCUT (ORANGE)

## (undated) DEVICE — PACK PERI GYN

## (undated) DEVICE — D HELP - CLEARVIEW CLEARIFY SYSTEM

## (undated) DEVICE — Device

## (undated) DEVICE — SYR LUER LOK 30CC

## (undated) DEVICE — XI SEAL UNIV 5- 8 MM

## (undated) DEVICE — XI OBTURATOR OPTICAL BLADELESS 8MM

## (undated) DEVICE — POSITIONER FOAM EGG CRATE ULNAR 2PCS (PINK)

## (undated) DEVICE — FOLEY TRAY 16FR 5CC LF UMETER CLOSED

## (undated) DEVICE — VENODYNE/SCD SLEEVE CALF MEDIUM

## (undated) DEVICE — GLV 7 PROTEXIS (WHITE)

## (undated) DEVICE — XI ARM FORCEP PROGRASP 8MM

## (undated) DEVICE — TROCAR COVIDIEN VERSAPORT BLADELESS OPTICAL 5MM STANDARD

## (undated) DEVICE — WARMING BLANKET UPPER ADULT

## (undated) DEVICE — XI ARM NEEDLE DRIVER SUTURECUT MEGA 8MM

## (undated) DEVICE — XI DRAPE COLUMN

## (undated) DEVICE — XI DRAPE ARM

## (undated) DEVICE — SUT MONOCRYL 4-0 27" PS-2 UNDYED

## (undated) DEVICE — XI ENDOWRIST SUCTION IRRIGATOR 8MM

## (undated) DEVICE — XI VESSEL SEALER

## (undated) DEVICE — SUT VLOC 180 0 9" GS-21 GREEN

## (undated) DEVICE — POSITIONER PINK PAD PIGAZZI SYSTEM XL W ARM PROTECTOR

## (undated) DEVICE — XI ARM SCISSOR MONO CURVED

## (undated) DEVICE — TROCAR APPLIED MEDICAL KII BALLOON BLUNT TIP 12MM X 100MM